# Patient Record
Sex: MALE | Race: ASIAN | NOT HISPANIC OR LATINO | URBAN - METROPOLITAN AREA
[De-identification: names, ages, dates, MRNs, and addresses within clinical notes are randomized per-mention and may not be internally consistent; named-entity substitution may affect disease eponyms.]

---

## 2023-01-01 ENCOUNTER — INPATIENT (INPATIENT)
Facility: HOSPITAL | Age: 83
LOS: 11 days | DRG: 871 | End: 2023-11-22
Attending: STUDENT IN AN ORGANIZED HEALTH CARE EDUCATION/TRAINING PROGRAM | Admitting: STUDENT IN AN ORGANIZED HEALTH CARE EDUCATION/TRAINING PROGRAM
Payer: MEDICARE

## 2023-01-01 VITALS
DIASTOLIC BLOOD PRESSURE: 78 MMHG | WEIGHT: 167.99 LBS | RESPIRATION RATE: 22 BRPM | HEART RATE: 104 BPM | HEIGHT: 63 IN | OXYGEN SATURATION: 95 % | SYSTOLIC BLOOD PRESSURE: 151 MMHG | TEMPERATURE: 103 F

## 2023-01-01 VITALS — HEART RATE: 102 BPM | TEMPERATURE: 103 F

## 2023-01-01 DIAGNOSIS — I10 ESSENTIAL (PRIMARY) HYPERTENSION: ICD-10-CM

## 2023-01-01 DIAGNOSIS — R09.89 OTHER SPECIFIED SYMPTOMS AND SIGNS INVOLVING THE CIRCULATORY AND RESPIRATORY SYSTEMS: ICD-10-CM

## 2023-01-01 DIAGNOSIS — Z29.9 ENCOUNTER FOR PROPHYLACTIC MEASURES, UNSPECIFIED: ICD-10-CM

## 2023-01-01 DIAGNOSIS — R13.12 DYSPHAGIA, OROPHARYNGEAL PHASE: ICD-10-CM

## 2023-01-01 DIAGNOSIS — F03.C0 UNSPECIFIED DEMENTIA, SEVERE, WITHOUT BEHAVIORAL DISTURBANCE, PSYCHOTIC DISTURBANCE, MOOD DISTURBANCE, AND ANXIETY: ICD-10-CM

## 2023-01-01 DIAGNOSIS — J06.9 ACUTE UPPER RESPIRATORY INFECTION, UNSPECIFIED: ICD-10-CM

## 2023-01-01 DIAGNOSIS — E44.0 MODERATE PROTEIN-CALORIE MALNUTRITION: ICD-10-CM

## 2023-01-01 DIAGNOSIS — A41.9 SEPSIS, UNSPECIFIED ORGANISM: ICD-10-CM

## 2023-01-01 DIAGNOSIS — E11.9 TYPE 2 DIABETES MELLITUS WITHOUT COMPLICATIONS: ICD-10-CM

## 2023-01-01 DIAGNOSIS — R29.810 FACIAL WEAKNESS: ICD-10-CM

## 2023-01-01 DIAGNOSIS — Z51.5 ENCOUNTER FOR PALLIATIVE CARE: ICD-10-CM

## 2023-01-01 DIAGNOSIS — R53.81 OTHER MALAISE: ICD-10-CM

## 2023-01-01 DIAGNOSIS — H10.9 UNSPECIFIED CONJUNCTIVITIS: ICD-10-CM

## 2023-01-01 DIAGNOSIS — I63.9 CEREBRAL INFARCTION, UNSPECIFIED: ICD-10-CM

## 2023-01-01 DIAGNOSIS — J96.01 ACUTE RESPIRATORY FAILURE WITH HYPOXIA: ICD-10-CM

## 2023-01-01 DIAGNOSIS — Z02.9 ENCOUNTER FOR ADMINISTRATIVE EXAMINATIONS, UNSPECIFIED: ICD-10-CM

## 2023-01-01 LAB
A1C WITH ESTIMATED AVERAGE GLUCOSE RESULT: 5.9 % — HIGH (ref 4–5.6)
A1C WITH ESTIMATED AVERAGE GLUCOSE RESULT: 5.9 % — HIGH (ref 4–5.6)
ALBUMIN SERPL ELPH-MCNC: 2.4 G/DL — LOW (ref 3.5–5)
ALBUMIN SERPL ELPH-MCNC: 2.4 G/DL — LOW (ref 3.5–5)
ALBUMIN SERPL ELPH-MCNC: 2.5 G/DL — LOW (ref 3.5–5)
ALBUMIN SERPL ELPH-MCNC: 2.5 G/DL — LOW (ref 3.5–5)
ALBUMIN SERPL ELPH-MCNC: 2.6 G/DL — LOW (ref 3.5–5)
ALBUMIN SERPL ELPH-MCNC: 2.6 G/DL — LOW (ref 3.5–5)
ALBUMIN SERPL ELPH-MCNC: 3.6 G/DL — SIGNIFICANT CHANGE UP (ref 3.5–5)
ALBUMIN SERPL ELPH-MCNC: 3.6 G/DL — SIGNIFICANT CHANGE UP (ref 3.5–5)
ALP SERPL-CCNC: 63 U/L — SIGNIFICANT CHANGE UP (ref 40–120)
ALP SERPL-CCNC: 63 U/L — SIGNIFICANT CHANGE UP (ref 40–120)
ALP SERPL-CCNC: 64 U/L — SIGNIFICANT CHANGE UP (ref 40–120)
ALP SERPL-CCNC: 85 U/L — SIGNIFICANT CHANGE UP (ref 40–120)
ALP SERPL-CCNC: 85 U/L — SIGNIFICANT CHANGE UP (ref 40–120)
ALT FLD-CCNC: 24 U/L DA — SIGNIFICANT CHANGE UP (ref 10–60)
ALT FLD-CCNC: 24 U/L DA — SIGNIFICANT CHANGE UP (ref 10–60)
ALT FLD-CCNC: 25 U/L DA — SIGNIFICANT CHANGE UP (ref 10–60)
ALT FLD-CCNC: 25 U/L DA — SIGNIFICANT CHANGE UP (ref 10–60)
ALT FLD-CCNC: 26 U/L DA — SIGNIFICANT CHANGE UP (ref 10–60)
ALT FLD-CCNC: 26 U/L DA — SIGNIFICANT CHANGE UP (ref 10–60)
ALT FLD-CCNC: 30 U/L DA — SIGNIFICANT CHANGE UP (ref 10–60)
ALT FLD-CCNC: 30 U/L DA — SIGNIFICANT CHANGE UP (ref 10–60)
ANION GAP SERPL CALC-SCNC: 5 MMOL/L — SIGNIFICANT CHANGE UP (ref 5–17)
ANION GAP SERPL CALC-SCNC: 6 MMOL/L — SIGNIFICANT CHANGE UP (ref 5–17)
ANION GAP SERPL CALC-SCNC: 6 MMOL/L — SIGNIFICANT CHANGE UP (ref 5–17)
ANION GAP SERPL CALC-SCNC: 7 MMOL/L — SIGNIFICANT CHANGE UP (ref 5–17)
ANION GAP SERPL CALC-SCNC: 9 MMOL/L — SIGNIFICANT CHANGE UP (ref 5–17)
ANION GAP SERPL CALC-SCNC: 9 MMOL/L — SIGNIFICANT CHANGE UP (ref 5–17)
APPEARANCE UR: CLEAR — SIGNIFICANT CHANGE UP
APPEARANCE UR: CLEAR — SIGNIFICANT CHANGE UP
APTT BLD: 37.7 SEC — HIGH (ref 24.5–35.6)
APTT BLD: 37.7 SEC — HIGH (ref 24.5–35.6)
AST SERPL-CCNC: 20 U/L — SIGNIFICANT CHANGE UP (ref 10–40)
AST SERPL-CCNC: 20 U/L — SIGNIFICANT CHANGE UP (ref 10–40)
AST SERPL-CCNC: 24 U/L — SIGNIFICANT CHANGE UP (ref 10–40)
AST SERPL-CCNC: 24 U/L — SIGNIFICANT CHANGE UP (ref 10–40)
AST SERPL-CCNC: 39 U/L — SIGNIFICANT CHANGE UP (ref 10–40)
AST SERPL-CCNC: 39 U/L — SIGNIFICANT CHANGE UP (ref 10–40)
AST SERPL-CCNC: 45 U/L — HIGH (ref 10–40)
AST SERPL-CCNC: 45 U/L — HIGH (ref 10–40)
B PERT DNA SPEC QL NAA+PROBE: SIGNIFICANT CHANGE UP
B PERT DNA SPEC QL NAA+PROBE: SIGNIFICANT CHANGE UP
BACTERIA # UR AUTO: ABNORMAL /HPF
BACTERIA # UR AUTO: ABNORMAL /HPF
BASE EXCESS BLDV CALC-SCNC: 0.6 MMOL/L — SIGNIFICANT CHANGE UP
BASE EXCESS BLDV CALC-SCNC: 0.6 MMOL/L — SIGNIFICANT CHANGE UP
BASOPHILS # BLD AUTO: 0.01 K/UL — SIGNIFICANT CHANGE UP (ref 0–0.2)
BASOPHILS # BLD AUTO: 0.02 K/UL — SIGNIFICANT CHANGE UP (ref 0–0.2)
BASOPHILS # BLD AUTO: 0.02 K/UL — SIGNIFICANT CHANGE UP (ref 0–0.2)
BASOPHILS NFR BLD AUTO: 0.2 % — SIGNIFICANT CHANGE UP (ref 0–2)
BILIRUB SERPL-MCNC: 0.5 MG/DL — SIGNIFICANT CHANGE UP (ref 0.2–1.2)
BILIRUB SERPL-MCNC: 0.5 MG/DL — SIGNIFICANT CHANGE UP (ref 0.2–1.2)
BILIRUB SERPL-MCNC: 0.6 MG/DL — SIGNIFICANT CHANGE UP (ref 0.2–1.2)
BILIRUB SERPL-MCNC: 0.6 MG/DL — SIGNIFICANT CHANGE UP (ref 0.2–1.2)
BILIRUB SERPL-MCNC: 0.8 MG/DL — SIGNIFICANT CHANGE UP (ref 0.2–1.2)
BILIRUB SERPL-MCNC: 0.8 MG/DL — SIGNIFICANT CHANGE UP (ref 0.2–1.2)
BILIRUB SERPL-MCNC: 0.9 MG/DL — SIGNIFICANT CHANGE UP (ref 0.2–1.2)
BILIRUB SERPL-MCNC: 0.9 MG/DL — SIGNIFICANT CHANGE UP (ref 0.2–1.2)
BILIRUB UR-MCNC: NEGATIVE — SIGNIFICANT CHANGE UP
BILIRUB UR-MCNC: NEGATIVE — SIGNIFICANT CHANGE UP
BLOOD GAS COMMENTS, VENOUS: SIGNIFICANT CHANGE UP
BLOOD GAS COMMENTS, VENOUS: SIGNIFICANT CHANGE UP
BUN SERPL-MCNC: 16 MG/DL — SIGNIFICANT CHANGE UP (ref 7–18)
BUN SERPL-MCNC: 16 MG/DL — SIGNIFICANT CHANGE UP (ref 7–18)
BUN SERPL-MCNC: 18 MG/DL — SIGNIFICANT CHANGE UP (ref 7–18)
BUN SERPL-MCNC: 20 MG/DL — HIGH (ref 7–18)
BUN SERPL-MCNC: 20 MG/DL — HIGH (ref 7–18)
BUN SERPL-MCNC: 21 MG/DL — HIGH (ref 7–18)
BUN SERPL-MCNC: 21 MG/DL — HIGH (ref 7–18)
BUN SERPL-MCNC: 22 MG/DL — HIGH (ref 7–18)
BUN SERPL-MCNC: 22 MG/DL — HIGH (ref 7–18)
BUN SERPL-MCNC: 23 MG/DL — HIGH (ref 7–18)
BUN SERPL-MCNC: 23 MG/DL — HIGH (ref 7–18)
BUN SERPL-MCNC: 26 MG/DL — HIGH (ref 7–18)
BUN SERPL-MCNC: 26 MG/DL — HIGH (ref 7–18)
C PNEUM DNA SPEC QL NAA+PROBE: SIGNIFICANT CHANGE UP
C PNEUM DNA SPEC QL NAA+PROBE: SIGNIFICANT CHANGE UP
CALCIUM SERPL-MCNC: 10.1 MG/DL — SIGNIFICANT CHANGE UP (ref 8.4–10.5)
CALCIUM SERPL-MCNC: 10.1 MG/DL — SIGNIFICANT CHANGE UP (ref 8.4–10.5)
CALCIUM SERPL-MCNC: 8.2 MG/DL — LOW (ref 8.4–10.5)
CALCIUM SERPL-MCNC: 8.2 MG/DL — LOW (ref 8.4–10.5)
CALCIUM SERPL-MCNC: 8.3 MG/DL — LOW (ref 8.4–10.5)
CALCIUM SERPL-MCNC: 8.3 MG/DL — LOW (ref 8.4–10.5)
CALCIUM SERPL-MCNC: 8.7 MG/DL — SIGNIFICANT CHANGE UP (ref 8.4–10.5)
CALCIUM SERPL-MCNC: 8.9 MG/DL — SIGNIFICANT CHANGE UP (ref 8.4–10.5)
CALCIUM SERPL-MCNC: 9 MG/DL — SIGNIFICANT CHANGE UP (ref 8.4–10.5)
CALCIUM SERPL-MCNC: 9 MG/DL — SIGNIFICANT CHANGE UP (ref 8.4–10.5)
CHLORIDE SERPL-SCNC: 104 MMOL/L — SIGNIFICANT CHANGE UP (ref 96–108)
CHLORIDE SERPL-SCNC: 104 MMOL/L — SIGNIFICANT CHANGE UP (ref 96–108)
CHLORIDE SERPL-SCNC: 107 MMOL/L — SIGNIFICANT CHANGE UP (ref 96–108)
CHLORIDE SERPL-SCNC: 107 MMOL/L — SIGNIFICANT CHANGE UP (ref 96–108)
CHLORIDE SERPL-SCNC: 109 MMOL/L — HIGH (ref 96–108)
CHLORIDE SERPL-SCNC: 109 MMOL/L — HIGH (ref 96–108)
CHLORIDE SERPL-SCNC: 110 MMOL/L — HIGH (ref 96–108)
CHLORIDE SERPL-SCNC: 111 MMOL/L — HIGH (ref 96–108)
CHLORIDE SERPL-SCNC: 111 MMOL/L — HIGH (ref 96–108)
CHLORIDE SERPL-SCNC: 114 MMOL/L — HIGH (ref 96–108)
CHLORIDE SERPL-SCNC: 114 MMOL/L — HIGH (ref 96–108)
CHLORIDE SERPL-SCNC: 115 MMOL/L — HIGH (ref 96–108)
CHLORIDE SERPL-SCNC: 115 MMOL/L — HIGH (ref 96–108)
CO2 SERPL-SCNC: 22 MMOL/L — SIGNIFICANT CHANGE UP (ref 22–31)
CO2 SERPL-SCNC: 22 MMOL/L — SIGNIFICANT CHANGE UP (ref 22–31)
CO2 SERPL-SCNC: 23 MMOL/L — SIGNIFICANT CHANGE UP (ref 22–31)
CO2 SERPL-SCNC: 23 MMOL/L — SIGNIFICANT CHANGE UP (ref 22–31)
CO2 SERPL-SCNC: 24 MMOL/L — SIGNIFICANT CHANGE UP (ref 22–31)
CO2 SERPL-SCNC: 25 MMOL/L — SIGNIFICANT CHANGE UP (ref 22–31)
CO2 SERPL-SCNC: 26 MMOL/L — SIGNIFICANT CHANGE UP (ref 22–31)
CO2 SERPL-SCNC: 26 MMOL/L — SIGNIFICANT CHANGE UP (ref 22–31)
COLOR SPEC: YELLOW — SIGNIFICANT CHANGE UP
COLOR SPEC: YELLOW — SIGNIFICANT CHANGE UP
CREAT SERPL-MCNC: 0.88 MG/DL — SIGNIFICANT CHANGE UP (ref 0.5–1.3)
CREAT SERPL-MCNC: 0.88 MG/DL — SIGNIFICANT CHANGE UP (ref 0.5–1.3)
CREAT SERPL-MCNC: 0.9 MG/DL — SIGNIFICANT CHANGE UP (ref 0.5–1.3)
CREAT SERPL-MCNC: 0.9 MG/DL — SIGNIFICANT CHANGE UP (ref 0.5–1.3)
CREAT SERPL-MCNC: 0.92 MG/DL — SIGNIFICANT CHANGE UP (ref 0.5–1.3)
CREAT SERPL-MCNC: 0.96 MG/DL — SIGNIFICANT CHANGE UP (ref 0.5–1.3)
CREAT SERPL-MCNC: 0.96 MG/DL — SIGNIFICANT CHANGE UP (ref 0.5–1.3)
CREAT SERPL-MCNC: 0.98 MG/DL — SIGNIFICANT CHANGE UP (ref 0.5–1.3)
CREAT SERPL-MCNC: 0.98 MG/DL — SIGNIFICANT CHANGE UP (ref 0.5–1.3)
CREAT SERPL-MCNC: 1.01 MG/DL — SIGNIFICANT CHANGE UP (ref 0.5–1.3)
CREAT SERPL-MCNC: 1.01 MG/DL — SIGNIFICANT CHANGE UP (ref 0.5–1.3)
CREAT SERPL-MCNC: 1.28 MG/DL — SIGNIFICANT CHANGE UP (ref 0.5–1.3)
CREAT SERPL-MCNC: 1.28 MG/DL — SIGNIFICANT CHANGE UP (ref 0.5–1.3)
CULTURE RESULTS: SIGNIFICANT CHANGE UP
DIFF PNL FLD: ABNORMAL
DIFF PNL FLD: ABNORMAL
EGFR: 56 ML/MIN/1.73M2 — LOW
EGFR: 56 ML/MIN/1.73M2 — LOW
EGFR: 74 ML/MIN/1.73M2 — SIGNIFICANT CHANGE UP
EGFR: 74 ML/MIN/1.73M2 — SIGNIFICANT CHANGE UP
EGFR: 77 ML/MIN/1.73M2 — SIGNIFICANT CHANGE UP
EGFR: 77 ML/MIN/1.73M2 — SIGNIFICANT CHANGE UP
EGFR: 78 ML/MIN/1.73M2 — SIGNIFICANT CHANGE UP
EGFR: 78 ML/MIN/1.73M2 — SIGNIFICANT CHANGE UP
EGFR: 83 ML/MIN/1.73M2 — SIGNIFICANT CHANGE UP
EGFR: 85 ML/MIN/1.73M2 — SIGNIFICANT CHANGE UP
EOSINOPHIL # BLD AUTO: 0 K/UL — SIGNIFICANT CHANGE UP (ref 0–0.5)
EOSINOPHIL # BLD AUTO: 0.01 K/UL — SIGNIFICANT CHANGE UP (ref 0–0.5)
EOSINOPHIL NFR BLD AUTO: 0 % — SIGNIFICANT CHANGE UP (ref 0–6)
EOSINOPHIL NFR BLD AUTO: 0.2 % — SIGNIFICANT CHANGE UP (ref 0–6)
EPI CELLS # UR: PRESENT
EPI CELLS # UR: PRESENT
ESTIMATED AVERAGE GLUCOSE: 123 MG/DL — HIGH (ref 68–114)
ESTIMATED AVERAGE GLUCOSE: 123 MG/DL — HIGH (ref 68–114)
FLUAV H1 2009 PAND RNA SPEC QL NAA+PROBE: SIGNIFICANT CHANGE UP
FLUAV H1 2009 PAND RNA SPEC QL NAA+PROBE: SIGNIFICANT CHANGE UP
FLUAV H1 RNA SPEC QL NAA+PROBE: SIGNIFICANT CHANGE UP
FLUAV H1 RNA SPEC QL NAA+PROBE: SIGNIFICANT CHANGE UP
FLUAV H3 RNA SPEC QL NAA+PROBE: SIGNIFICANT CHANGE UP
FLUAV H3 RNA SPEC QL NAA+PROBE: SIGNIFICANT CHANGE UP
FLUAV SUBTYP SPEC NAA+PROBE: SIGNIFICANT CHANGE UP
FLUAV SUBTYP SPEC NAA+PROBE: SIGNIFICANT CHANGE UP
FLUBV RNA SPEC QL NAA+PROBE: SIGNIFICANT CHANGE UP
FLUBV RNA SPEC QL NAA+PROBE: SIGNIFICANT CHANGE UP
GLUCOSE SERPL-MCNC: 108 MG/DL — HIGH (ref 70–99)
GLUCOSE SERPL-MCNC: 108 MG/DL — HIGH (ref 70–99)
GLUCOSE SERPL-MCNC: 110 MG/DL — HIGH (ref 70–99)
GLUCOSE SERPL-MCNC: 110 MG/DL — HIGH (ref 70–99)
GLUCOSE SERPL-MCNC: 113 MG/DL — HIGH (ref 70–99)
GLUCOSE SERPL-MCNC: 113 MG/DL — HIGH (ref 70–99)
GLUCOSE SERPL-MCNC: 126 MG/DL — HIGH (ref 70–99)
GLUCOSE SERPL-MCNC: 126 MG/DL — HIGH (ref 70–99)
GLUCOSE SERPL-MCNC: 134 MG/DL — HIGH (ref 70–99)
GLUCOSE SERPL-MCNC: 134 MG/DL — HIGH (ref 70–99)
GLUCOSE SERPL-MCNC: 138 MG/DL — HIGH (ref 70–99)
GLUCOSE SERPL-MCNC: 138 MG/DL — HIGH (ref 70–99)
GLUCOSE SERPL-MCNC: 96 MG/DL — SIGNIFICANT CHANGE UP (ref 70–99)
GLUCOSE SERPL-MCNC: 96 MG/DL — SIGNIFICANT CHANGE UP (ref 70–99)
GLUCOSE SERPL-MCNC: 97 MG/DL — SIGNIFICANT CHANGE UP (ref 70–99)
GLUCOSE SERPL-MCNC: 97 MG/DL — SIGNIFICANT CHANGE UP (ref 70–99)
GLUCOSE UR QL: NEGATIVE MG/DL — SIGNIFICANT CHANGE UP
GLUCOSE UR QL: NEGATIVE MG/DL — SIGNIFICANT CHANGE UP
HADV DNA SPEC QL NAA+PROBE: SIGNIFICANT CHANGE UP
HADV DNA SPEC QL NAA+PROBE: SIGNIFICANT CHANGE UP
HCO3 BLDV-SCNC: 24 MMOL/L — SIGNIFICANT CHANGE UP (ref 22–29)
HCO3 BLDV-SCNC: 24 MMOL/L — SIGNIFICANT CHANGE UP (ref 22–29)
HCOV PNL SPEC NAA+PROBE: DETECTED
HCOV PNL SPEC NAA+PROBE: DETECTED
HCT VFR BLD CALC: 37 % — LOW (ref 39–50)
HCT VFR BLD CALC: 37 % — LOW (ref 39–50)
HCT VFR BLD CALC: 37.3 % — LOW (ref 39–50)
HCT VFR BLD CALC: 37.3 % — LOW (ref 39–50)
HCT VFR BLD CALC: 38.4 % — LOW (ref 39–50)
HCT VFR BLD CALC: 38.4 % — LOW (ref 39–50)
HCT VFR BLD CALC: 39 % — SIGNIFICANT CHANGE UP (ref 39–50)
HCT VFR BLD CALC: 39 % — SIGNIFICANT CHANGE UP (ref 39–50)
HCT VFR BLD CALC: 39.5 % — SIGNIFICANT CHANGE UP (ref 39–50)
HCT VFR BLD CALC: 40.5 % — SIGNIFICANT CHANGE UP (ref 39–50)
HCT VFR BLD CALC: 40.5 % — SIGNIFICANT CHANGE UP (ref 39–50)
HCT VFR BLD CALC: 43.9 % — SIGNIFICANT CHANGE UP (ref 39–50)
HCT VFR BLD CALC: 43.9 % — SIGNIFICANT CHANGE UP (ref 39–50)
HGB BLD-MCNC: 12.3 G/DL — LOW (ref 13–17)
HGB BLD-MCNC: 12.3 G/DL — LOW (ref 13–17)
HGB BLD-MCNC: 12.4 G/DL — LOW (ref 13–17)
HGB BLD-MCNC: 12.4 G/DL — LOW (ref 13–17)
HGB BLD-MCNC: 12.6 G/DL — LOW (ref 13–17)
HGB BLD-MCNC: 12.6 G/DL — LOW (ref 13–17)
HGB BLD-MCNC: 13 G/DL — SIGNIFICANT CHANGE UP (ref 13–17)
HGB BLD-MCNC: 13.1 G/DL — SIGNIFICANT CHANGE UP (ref 13–17)
HGB BLD-MCNC: 13.1 G/DL — SIGNIFICANT CHANGE UP (ref 13–17)
HGB BLD-MCNC: 14.7 G/DL — SIGNIFICANT CHANGE UP (ref 13–17)
HGB BLD-MCNC: 14.7 G/DL — SIGNIFICANT CHANGE UP (ref 13–17)
HMPV RNA SPEC QL NAA+PROBE: SIGNIFICANT CHANGE UP
HMPV RNA SPEC QL NAA+PROBE: SIGNIFICANT CHANGE UP
HOROWITZ INDEX BLDV+IHG-RTO: 21 — SIGNIFICANT CHANGE UP
HOROWITZ INDEX BLDV+IHG-RTO: 21 — SIGNIFICANT CHANGE UP
HPIV1 RNA SPEC QL NAA+PROBE: SIGNIFICANT CHANGE UP
HPIV1 RNA SPEC QL NAA+PROBE: SIGNIFICANT CHANGE UP
HPIV2 RNA SPEC QL NAA+PROBE: SIGNIFICANT CHANGE UP
HPIV2 RNA SPEC QL NAA+PROBE: SIGNIFICANT CHANGE UP
HPIV3 RNA SPEC QL NAA+PROBE: SIGNIFICANT CHANGE UP
HPIV3 RNA SPEC QL NAA+PROBE: SIGNIFICANT CHANGE UP
HPIV4 RNA SPEC QL NAA+PROBE: SIGNIFICANT CHANGE UP
HPIV4 RNA SPEC QL NAA+PROBE: SIGNIFICANT CHANGE UP
IMM GRANULOCYTES NFR BLD AUTO: 0.2 % — SIGNIFICANT CHANGE UP (ref 0–0.9)
IMM GRANULOCYTES NFR BLD AUTO: 0.2 % — SIGNIFICANT CHANGE UP (ref 0–0.9)
IMM GRANULOCYTES NFR BLD AUTO: 0.3 % — SIGNIFICANT CHANGE UP (ref 0–0.9)
IMM GRANULOCYTES NFR BLD AUTO: 0.3 % — SIGNIFICANT CHANGE UP (ref 0–0.9)
IMM GRANULOCYTES NFR BLD AUTO: 0.4 % — SIGNIFICANT CHANGE UP (ref 0–0.9)
INR BLD: 1.01 RATIO — SIGNIFICANT CHANGE UP (ref 0.85–1.18)
INR BLD: 1.01 RATIO — SIGNIFICANT CHANGE UP (ref 0.85–1.18)
KETONES UR-MCNC: 15 MG/DL
KETONES UR-MCNC: 15 MG/DL
LACTATE SERPL-SCNC: 1.9 MMOL/L — SIGNIFICANT CHANGE UP (ref 0.7–2)
LACTATE SERPL-SCNC: 1.9 MMOL/L — SIGNIFICANT CHANGE UP (ref 0.7–2)
LEUKOCYTE ESTERASE UR-ACNC: NEGATIVE — SIGNIFICANT CHANGE UP
LEUKOCYTE ESTERASE UR-ACNC: NEGATIVE — SIGNIFICANT CHANGE UP
LYMPHOCYTES # BLD AUTO: 0.55 K/UL — LOW (ref 1–3.3)
LYMPHOCYTES # BLD AUTO: 0.55 K/UL — LOW (ref 1–3.3)
LYMPHOCYTES # BLD AUTO: 0.88 K/UL — LOW (ref 1–3.3)
LYMPHOCYTES # BLD AUTO: 0.88 K/UL — LOW (ref 1–3.3)
LYMPHOCYTES # BLD AUTO: 0.95 K/UL — LOW (ref 1–3.3)
LYMPHOCYTES # BLD AUTO: 0.95 K/UL — LOW (ref 1–3.3)
LYMPHOCYTES # BLD AUTO: 1.1 K/UL — SIGNIFICANT CHANGE UP (ref 1–3.3)
LYMPHOCYTES # BLD AUTO: 1.1 K/UL — SIGNIFICANT CHANGE UP (ref 1–3.3)
LYMPHOCYTES # BLD AUTO: 16.2 % — SIGNIFICANT CHANGE UP (ref 13–44)
LYMPHOCYTES # BLD AUTO: 16.2 % — SIGNIFICANT CHANGE UP (ref 13–44)
LYMPHOCYTES # BLD AUTO: 17.9 % — SIGNIFICANT CHANGE UP (ref 13–44)
LYMPHOCYTES # BLD AUTO: 17.9 % — SIGNIFICANT CHANGE UP (ref 13–44)
LYMPHOCYTES # BLD AUTO: 20.1 % — SIGNIFICANT CHANGE UP (ref 13–44)
LYMPHOCYTES # BLD AUTO: 20.1 % — SIGNIFICANT CHANGE UP (ref 13–44)
LYMPHOCYTES # BLD AUTO: 6.8 % — LOW (ref 13–44)
LYMPHOCYTES # BLD AUTO: 6.8 % — LOW (ref 13–44)
MAGNESIUM SERPL-MCNC: 2 MG/DL — SIGNIFICANT CHANGE UP (ref 1.6–2.6)
MAGNESIUM SERPL-MCNC: 2 MG/DL — SIGNIFICANT CHANGE UP (ref 1.6–2.6)
MAGNESIUM SERPL-MCNC: 2.2 MG/DL — SIGNIFICANT CHANGE UP (ref 1.6–2.6)
MAGNESIUM SERPL-MCNC: 2.2 MG/DL — SIGNIFICANT CHANGE UP (ref 1.6–2.6)
MAGNESIUM SERPL-MCNC: 2.3 MG/DL — SIGNIFICANT CHANGE UP (ref 1.6–2.6)
MAGNESIUM SERPL-MCNC: 2.3 MG/DL — SIGNIFICANT CHANGE UP (ref 1.6–2.6)
MAGNESIUM SERPL-MCNC: 2.4 MG/DL — SIGNIFICANT CHANGE UP (ref 1.6–2.6)
MAGNESIUM SERPL-MCNC: 2.5 MG/DL — SIGNIFICANT CHANGE UP (ref 1.6–2.6)
MCHC RBC-ENTMCNC: 30.3 PG — SIGNIFICANT CHANGE UP (ref 27–34)
MCHC RBC-ENTMCNC: 30.3 PG — SIGNIFICANT CHANGE UP (ref 27–34)
MCHC RBC-ENTMCNC: 30.6 PG — SIGNIFICANT CHANGE UP (ref 27–34)
MCHC RBC-ENTMCNC: 30.6 PG — SIGNIFICANT CHANGE UP (ref 27–34)
MCHC RBC-ENTMCNC: 30.8 PG — SIGNIFICANT CHANGE UP (ref 27–34)
MCHC RBC-ENTMCNC: 30.8 PG — SIGNIFICANT CHANGE UP (ref 27–34)
MCHC RBC-ENTMCNC: 30.9 PG — SIGNIFICANT CHANGE UP (ref 27–34)
MCHC RBC-ENTMCNC: 30.9 PG — SIGNIFICANT CHANGE UP (ref 27–34)
MCHC RBC-ENTMCNC: 31.1 PG — SIGNIFICANT CHANGE UP (ref 27–34)
MCHC RBC-ENTMCNC: 31.1 PG — SIGNIFICANT CHANGE UP (ref 27–34)
MCHC RBC-ENTMCNC: 31.3 PG — SIGNIFICANT CHANGE UP (ref 27–34)
MCHC RBC-ENTMCNC: 31.3 PG — SIGNIFICANT CHANGE UP (ref 27–34)
MCHC RBC-ENTMCNC: 31.5 PG — SIGNIFICANT CHANGE UP (ref 27–34)
MCHC RBC-ENTMCNC: 32.3 GM/DL — SIGNIFICANT CHANGE UP (ref 32–36)
MCHC RBC-ENTMCNC: 32.3 GM/DL — SIGNIFICANT CHANGE UP (ref 32–36)
MCHC RBC-ENTMCNC: 32.8 GM/DL — SIGNIFICANT CHANGE UP (ref 32–36)
MCHC RBC-ENTMCNC: 32.8 GM/DL — SIGNIFICANT CHANGE UP (ref 32–36)
MCHC RBC-ENTMCNC: 32.9 GM/DL — SIGNIFICANT CHANGE UP (ref 32–36)
MCHC RBC-ENTMCNC: 33.2 GM/DL — SIGNIFICANT CHANGE UP (ref 32–36)
MCHC RBC-ENTMCNC: 33.3 GM/DL — SIGNIFICANT CHANGE UP (ref 32–36)
MCHC RBC-ENTMCNC: 33.3 GM/DL — SIGNIFICANT CHANGE UP (ref 32–36)
MCHC RBC-ENTMCNC: 33.5 GM/DL — SIGNIFICANT CHANGE UP (ref 32–36)
MCHC RBC-ENTMCNC: 33.5 GM/DL — SIGNIFICANT CHANGE UP (ref 32–36)
MCV RBC AUTO: 92.8 FL — SIGNIFICANT CHANGE UP (ref 80–100)
MCV RBC AUTO: 92.8 FL — SIGNIFICANT CHANGE UP (ref 80–100)
MCV RBC AUTO: 93 FL — SIGNIFICANT CHANGE UP (ref 80–100)
MCV RBC AUTO: 93 FL — SIGNIFICANT CHANGE UP (ref 80–100)
MCV RBC AUTO: 93.2 FL — SIGNIFICANT CHANGE UP (ref 80–100)
MCV RBC AUTO: 93.2 FL — SIGNIFICANT CHANGE UP (ref 80–100)
MCV RBC AUTO: 93.6 FL — SIGNIFICANT CHANGE UP (ref 80–100)
MCV RBC AUTO: 93.6 FL — SIGNIFICANT CHANGE UP (ref 80–100)
MCV RBC AUTO: 93.8 FL — SIGNIFICANT CHANGE UP (ref 80–100)
MCV RBC AUTO: 93.8 FL — SIGNIFICANT CHANGE UP (ref 80–100)
MCV RBC AUTO: 94 FL — SIGNIFICANT CHANGE UP (ref 80–100)
MCV RBC AUTO: 94 FL — SIGNIFICANT CHANGE UP (ref 80–100)
MCV RBC AUTO: 94.7 FL — SIGNIFICANT CHANGE UP (ref 80–100)
MCV RBC AUTO: 94.7 FL — SIGNIFICANT CHANGE UP (ref 80–100)
MCV RBC AUTO: 95.6 FL — SIGNIFICANT CHANGE UP (ref 80–100)
MCV RBC AUTO: 95.6 FL — SIGNIFICANT CHANGE UP (ref 80–100)
MONOCYTES # BLD AUTO: 0.52 K/UL — SIGNIFICANT CHANGE UP (ref 0–0.9)
MONOCYTES # BLD AUTO: 0.52 K/UL — SIGNIFICANT CHANGE UP (ref 0–0.9)
MONOCYTES # BLD AUTO: 0.56 K/UL — SIGNIFICANT CHANGE UP (ref 0–0.9)
MONOCYTES # BLD AUTO: 0.56 K/UL — SIGNIFICANT CHANGE UP (ref 0–0.9)
MONOCYTES # BLD AUTO: 0.58 K/UL — SIGNIFICANT CHANGE UP (ref 0–0.9)
MONOCYTES # BLD AUTO: 0.58 K/UL — SIGNIFICANT CHANGE UP (ref 0–0.9)
MONOCYTES # BLD AUTO: 0.71 K/UL — SIGNIFICANT CHANGE UP (ref 0–0.9)
MONOCYTES # BLD AUTO: 0.71 K/UL — SIGNIFICANT CHANGE UP (ref 0–0.9)
MONOCYTES NFR BLD AUTO: 11.4 % — SIGNIFICANT CHANGE UP (ref 2–14)
MONOCYTES NFR BLD AUTO: 11.4 % — SIGNIFICANT CHANGE UP (ref 2–14)
MONOCYTES NFR BLD AUTO: 8.8 % — SIGNIFICANT CHANGE UP (ref 2–14)
MONOCYTES NFR BLD AUTO: 8.8 % — SIGNIFICANT CHANGE UP (ref 2–14)
MONOCYTES NFR BLD AUTO: 9.5 % — SIGNIFICANT CHANGE UP (ref 2–14)
MONOCYTES NFR BLD AUTO: 9.5 % — SIGNIFICANT CHANGE UP (ref 2–14)
MONOCYTES NFR BLD AUTO: 9.9 % — SIGNIFICANT CHANGE UP (ref 2–14)
MONOCYTES NFR BLD AUTO: 9.9 % — SIGNIFICANT CHANGE UP (ref 2–14)
MRSA PCR RESULT.: SIGNIFICANT CHANGE UP
MRSA PCR RESULT.: SIGNIFICANT CHANGE UP
NEUTROPHILS # BLD AUTO: 3.44 K/UL — SIGNIFICANT CHANGE UP (ref 1.8–7.4)
NEUTROPHILS # BLD AUTO: 3.44 K/UL — SIGNIFICANT CHANGE UP (ref 1.8–7.4)
NEUTROPHILS # BLD AUTO: 3.81 K/UL — SIGNIFICANT CHANGE UP (ref 1.8–7.4)
NEUTROPHILS # BLD AUTO: 3.81 K/UL — SIGNIFICANT CHANGE UP (ref 1.8–7.4)
NEUTROPHILS # BLD AUTO: 4.32 K/UL — SIGNIFICANT CHANGE UP (ref 1.8–7.4)
NEUTROPHILS # BLD AUTO: 4.32 K/UL — SIGNIFICANT CHANGE UP (ref 1.8–7.4)
NEUTROPHILS # BLD AUTO: 6.73 K/UL — SIGNIFICANT CHANGE UP (ref 1.8–7.4)
NEUTROPHILS # BLD AUTO: 6.73 K/UL — SIGNIFICANT CHANGE UP (ref 1.8–7.4)
NEUTROPHILS NFR BLD AUTO: 69.6 % — SIGNIFICANT CHANGE UP (ref 43–77)
NEUTROPHILS NFR BLD AUTO: 69.6 % — SIGNIFICANT CHANGE UP (ref 43–77)
NEUTROPHILS NFR BLD AUTO: 69.9 % — SIGNIFICANT CHANGE UP (ref 43–77)
NEUTROPHILS NFR BLD AUTO: 69.9 % — SIGNIFICANT CHANGE UP (ref 43–77)
NEUTROPHILS NFR BLD AUTO: 73.4 % — SIGNIFICANT CHANGE UP (ref 43–77)
NEUTROPHILS NFR BLD AUTO: 73.4 % — SIGNIFICANT CHANGE UP (ref 43–77)
NEUTROPHILS NFR BLD AUTO: 84 % — HIGH (ref 43–77)
NEUTROPHILS NFR BLD AUTO: 84 % — HIGH (ref 43–77)
NITRITE UR-MCNC: NEGATIVE — SIGNIFICANT CHANGE UP
NITRITE UR-MCNC: NEGATIVE — SIGNIFICANT CHANGE UP
NRBC # BLD: 0 /100 WBCS — SIGNIFICANT CHANGE UP (ref 0–0)
PCO2 BLDV: 36 MMHG — LOW (ref 42–55)
PCO2 BLDV: 36 MMHG — LOW (ref 42–55)
PH BLDV: 7.44 — HIGH (ref 7.32–7.43)
PH BLDV: 7.44 — HIGH (ref 7.32–7.43)
PH UR: 5.5 — SIGNIFICANT CHANGE UP (ref 5–8)
PH UR: 5.5 — SIGNIFICANT CHANGE UP (ref 5–8)
PHOSPHATE SERPL-MCNC: 2 MG/DL — LOW (ref 2.5–4.5)
PHOSPHATE SERPL-MCNC: 2 MG/DL — LOW (ref 2.5–4.5)
PHOSPHATE SERPL-MCNC: 2.4 MG/DL — LOW (ref 2.5–4.5)
PHOSPHATE SERPL-MCNC: 2.4 MG/DL — LOW (ref 2.5–4.5)
PHOSPHATE SERPL-MCNC: 2.6 MG/DL — SIGNIFICANT CHANGE UP (ref 2.5–4.5)
PHOSPHATE SERPL-MCNC: 2.6 MG/DL — SIGNIFICANT CHANGE UP (ref 2.5–4.5)
PHOSPHATE SERPL-MCNC: 2.8 MG/DL — SIGNIFICANT CHANGE UP (ref 2.5–4.5)
PHOSPHATE SERPL-MCNC: 2.8 MG/DL — SIGNIFICANT CHANGE UP (ref 2.5–4.5)
PHOSPHATE SERPL-MCNC: 2.9 MG/DL — SIGNIFICANT CHANGE UP (ref 2.5–4.5)
PHOSPHATE SERPL-MCNC: 3 MG/DL — SIGNIFICANT CHANGE UP (ref 2.5–4.5)
PHOSPHATE SERPL-MCNC: 3 MG/DL — SIGNIFICANT CHANGE UP (ref 2.5–4.5)
PLATELET # BLD AUTO: 167 K/UL — SIGNIFICANT CHANGE UP (ref 150–400)
PLATELET # BLD AUTO: 167 K/UL — SIGNIFICANT CHANGE UP (ref 150–400)
PLATELET # BLD AUTO: 177 K/UL — SIGNIFICANT CHANGE UP (ref 150–400)
PLATELET # BLD AUTO: 177 K/UL — SIGNIFICANT CHANGE UP (ref 150–400)
PLATELET # BLD AUTO: 179 K/UL — SIGNIFICANT CHANGE UP (ref 150–400)
PLATELET # BLD AUTO: 179 K/UL — SIGNIFICANT CHANGE UP (ref 150–400)
PLATELET # BLD AUTO: 191 K/UL — SIGNIFICANT CHANGE UP (ref 150–400)
PLATELET # BLD AUTO: 191 K/UL — SIGNIFICANT CHANGE UP (ref 150–400)
PLATELET # BLD AUTO: 199 K/UL — SIGNIFICANT CHANGE UP (ref 150–400)
PLATELET # BLD AUTO: 199 K/UL — SIGNIFICANT CHANGE UP (ref 150–400)
PLATELET # BLD AUTO: 200 K/UL — SIGNIFICANT CHANGE UP (ref 150–400)
PLATELET # BLD AUTO: 200 K/UL — SIGNIFICANT CHANGE UP (ref 150–400)
PLATELET # BLD AUTO: 206 K/UL — SIGNIFICANT CHANGE UP (ref 150–400)
PLATELET # BLD AUTO: 206 K/UL — SIGNIFICANT CHANGE UP (ref 150–400)
PLATELET # BLD AUTO: 223 K/UL — SIGNIFICANT CHANGE UP (ref 150–400)
PLATELET # BLD AUTO: 223 K/UL — SIGNIFICANT CHANGE UP (ref 150–400)
PO2 BLDV: 51 MMHG — SIGNIFICANT CHANGE UP
PO2 BLDV: 51 MMHG — SIGNIFICANT CHANGE UP
POTASSIUM SERPL-MCNC: 3.4 MMOL/L — LOW (ref 3.5–5.3)
POTASSIUM SERPL-MCNC: 3.7 MMOL/L — SIGNIFICANT CHANGE UP (ref 3.5–5.3)
POTASSIUM SERPL-MCNC: 3.7 MMOL/L — SIGNIFICANT CHANGE UP (ref 3.5–5.3)
POTASSIUM SERPL-MCNC: 3.8 MMOL/L — SIGNIFICANT CHANGE UP (ref 3.5–5.3)
POTASSIUM SERPL-MCNC: 3.8 MMOL/L — SIGNIFICANT CHANGE UP (ref 3.5–5.3)
POTASSIUM SERPL-MCNC: 3.9 MMOL/L — SIGNIFICANT CHANGE UP (ref 3.5–5.3)
POTASSIUM SERPL-MCNC: 4 MMOL/L — SIGNIFICANT CHANGE UP (ref 3.5–5.3)
POTASSIUM SERPL-MCNC: 4 MMOL/L — SIGNIFICANT CHANGE UP (ref 3.5–5.3)
POTASSIUM SERPL-MCNC: 4.6 MMOL/L — SIGNIFICANT CHANGE UP (ref 3.5–5.3)
POTASSIUM SERPL-MCNC: 4.6 MMOL/L — SIGNIFICANT CHANGE UP (ref 3.5–5.3)
POTASSIUM SERPL-SCNC: 3.4 MMOL/L — LOW (ref 3.5–5.3)
POTASSIUM SERPL-SCNC: 3.7 MMOL/L — SIGNIFICANT CHANGE UP (ref 3.5–5.3)
POTASSIUM SERPL-SCNC: 3.7 MMOL/L — SIGNIFICANT CHANGE UP (ref 3.5–5.3)
POTASSIUM SERPL-SCNC: 3.8 MMOL/L — SIGNIFICANT CHANGE UP (ref 3.5–5.3)
POTASSIUM SERPL-SCNC: 3.8 MMOL/L — SIGNIFICANT CHANGE UP (ref 3.5–5.3)
POTASSIUM SERPL-SCNC: 3.9 MMOL/L — SIGNIFICANT CHANGE UP (ref 3.5–5.3)
POTASSIUM SERPL-SCNC: 4 MMOL/L — SIGNIFICANT CHANGE UP (ref 3.5–5.3)
POTASSIUM SERPL-SCNC: 4 MMOL/L — SIGNIFICANT CHANGE UP (ref 3.5–5.3)
POTASSIUM SERPL-SCNC: 4.6 MMOL/L — SIGNIFICANT CHANGE UP (ref 3.5–5.3)
POTASSIUM SERPL-SCNC: 4.6 MMOL/L — SIGNIFICANT CHANGE UP (ref 3.5–5.3)
PROT SERPL-MCNC: 7.1 G/DL — SIGNIFICANT CHANGE UP (ref 6–8.3)
PROT SERPL-MCNC: 7.4 G/DL — SIGNIFICANT CHANGE UP (ref 6–8.3)
PROT SERPL-MCNC: 7.4 G/DL — SIGNIFICANT CHANGE UP (ref 6–8.3)
PROT SERPL-MCNC: 8.7 G/DL — HIGH (ref 6–8.3)
PROT SERPL-MCNC: 8.7 G/DL — HIGH (ref 6–8.3)
PROT UR-MCNC: 100 MG/DL
PROT UR-MCNC: 100 MG/DL
PROTHROM AB SERPL-ACNC: 11.5 SEC — SIGNIFICANT CHANGE UP (ref 9.5–13)
PROTHROM AB SERPL-ACNC: 11.5 SEC — SIGNIFICANT CHANGE UP (ref 9.5–13)
RAPID RVP RESULT: DETECTED
RAPID RVP RESULT: DETECTED
RBC # BLD: 3.94 M/UL — LOW (ref 4.2–5.8)
RBC # BLD: 3.94 M/UL — LOW (ref 4.2–5.8)
RBC # BLD: 3.98 M/UL — LOW (ref 4.2–5.8)
RBC # BLD: 3.98 M/UL — LOW (ref 4.2–5.8)
RBC # BLD: 4.12 M/UL — LOW (ref 4.2–5.8)
RBC # BLD: 4.12 M/UL — LOW (ref 4.2–5.8)
RBC # BLD: 4.13 M/UL — LOW (ref 4.2–5.8)
RBC # BLD: 4.13 M/UL — LOW (ref 4.2–5.8)
RBC # BLD: 4.15 M/UL — LOW (ref 4.2–5.8)
RBC # BLD: 4.15 M/UL — LOW (ref 4.2–5.8)
RBC # BLD: 4.22 M/UL — SIGNIFICANT CHANGE UP (ref 4.2–5.8)
RBC # BLD: 4.22 M/UL — SIGNIFICANT CHANGE UP (ref 4.2–5.8)
RBC # BLD: 4.32 M/UL — SIGNIFICANT CHANGE UP (ref 4.2–5.8)
RBC # BLD: 4.32 M/UL — SIGNIFICANT CHANGE UP (ref 4.2–5.8)
RBC # BLD: 4.73 M/UL — SIGNIFICANT CHANGE UP (ref 4.2–5.8)
RBC # BLD: 4.73 M/UL — SIGNIFICANT CHANGE UP (ref 4.2–5.8)
RBC # FLD: 12.8 % — SIGNIFICANT CHANGE UP (ref 10.3–14.5)
RBC # FLD: 12.9 % — SIGNIFICANT CHANGE UP (ref 10.3–14.5)
RBC # FLD: 13.2 % — SIGNIFICANT CHANGE UP (ref 10.3–14.5)
RBC # FLD: 13.4 % — SIGNIFICANT CHANGE UP (ref 10.3–14.5)
RBC # FLD: 13.4 % — SIGNIFICANT CHANGE UP (ref 10.3–14.5)
RBC # FLD: 13.5 % — SIGNIFICANT CHANGE UP (ref 10.3–14.5)
RBC # FLD: 13.5 % — SIGNIFICANT CHANGE UP (ref 10.3–14.5)
RBC CASTS # UR COMP ASSIST: 0 /HPF — SIGNIFICANT CHANGE UP (ref 0–4)
RBC CASTS # UR COMP ASSIST: 0 /HPF — SIGNIFICANT CHANGE UP (ref 0–4)
RV+EV RNA SPEC QL NAA+PROBE: SIGNIFICANT CHANGE UP
RV+EV RNA SPEC QL NAA+PROBE: SIGNIFICANT CHANGE UP
S AUREUS DNA NOSE QL NAA+PROBE: DETECTED
S AUREUS DNA NOSE QL NAA+PROBE: DETECTED
SAO2 % BLDV: 85.4 % — SIGNIFICANT CHANGE UP
SAO2 % BLDV: 85.4 % — SIGNIFICANT CHANGE UP
SARS-COV-2 RNA SPEC QL NAA+PROBE: SIGNIFICANT CHANGE UP
SARS-COV-2 RNA SPEC QL NAA+PROBE: SIGNIFICANT CHANGE UP
SODIUM SERPL-SCNC: 136 MMOL/L — SIGNIFICANT CHANGE UP (ref 135–145)
SODIUM SERPL-SCNC: 136 MMOL/L — SIGNIFICANT CHANGE UP (ref 135–145)
SODIUM SERPL-SCNC: 138 MMOL/L — SIGNIFICANT CHANGE UP (ref 135–145)
SODIUM SERPL-SCNC: 138 MMOL/L — SIGNIFICANT CHANGE UP (ref 135–145)
SODIUM SERPL-SCNC: 139 MMOL/L — SIGNIFICANT CHANGE UP (ref 135–145)
SODIUM SERPL-SCNC: 140 MMOL/L — SIGNIFICANT CHANGE UP (ref 135–145)
SODIUM SERPL-SCNC: 140 MMOL/L — SIGNIFICANT CHANGE UP (ref 135–145)
SODIUM SERPL-SCNC: 142 MMOL/L — SIGNIFICANT CHANGE UP (ref 135–145)
SODIUM SERPL-SCNC: 142 MMOL/L — SIGNIFICANT CHANGE UP (ref 135–145)
SODIUM SERPL-SCNC: 144 MMOL/L — SIGNIFICANT CHANGE UP (ref 135–145)
SP GR SPEC: 1.02 — SIGNIFICANT CHANGE UP (ref 1–1.03)
SP GR SPEC: 1.02 — SIGNIFICANT CHANGE UP (ref 1–1.03)
SPECIMEN SOURCE: SIGNIFICANT CHANGE UP
TROPONIN I, HIGH SENSITIVITY RESULT: 108.4 NG/L — HIGH
TROPONIN I, HIGH SENSITIVITY RESULT: 108.4 NG/L — HIGH
TROPONIN I, HIGH SENSITIVITY RESULT: 111.2 NG/L — HIGH
TROPONIN I, HIGH SENSITIVITY RESULT: 111.2 NG/L — HIGH
TROPONIN I, HIGH SENSITIVITY RESULT: 120.5 NG/L — HIGH
TROPONIN I, HIGH SENSITIVITY RESULT: 120.5 NG/L — HIGH
UROBILINOGEN FLD QL: 1 MG/DL — SIGNIFICANT CHANGE UP (ref 0.2–1)
UROBILINOGEN FLD QL: 1 MG/DL — SIGNIFICANT CHANGE UP (ref 0.2–1)
WBC # BLD: 4.92 K/UL — SIGNIFICANT CHANGE UP (ref 3.8–10.5)
WBC # BLD: 4.92 K/UL — SIGNIFICANT CHANGE UP (ref 3.8–10.5)
WBC # BLD: 5.11 K/UL — SIGNIFICANT CHANGE UP (ref 3.8–10.5)
WBC # BLD: 5.11 K/UL — SIGNIFICANT CHANGE UP (ref 3.8–10.5)
WBC # BLD: 5.47 K/UL — SIGNIFICANT CHANGE UP (ref 3.8–10.5)
WBC # BLD: 5.47 K/UL — SIGNIFICANT CHANGE UP (ref 3.8–10.5)
WBC # BLD: 5.7 K/UL — SIGNIFICANT CHANGE UP (ref 3.8–10.5)
WBC # BLD: 5.7 K/UL — SIGNIFICANT CHANGE UP (ref 3.8–10.5)
WBC # BLD: 5.88 K/UL — SIGNIFICANT CHANGE UP (ref 3.8–10.5)
WBC # BLD: 5.88 K/UL — SIGNIFICANT CHANGE UP (ref 3.8–10.5)
WBC # BLD: 6.33 K/UL — SIGNIFICANT CHANGE UP (ref 3.8–10.5)
WBC # BLD: 6.33 K/UL — SIGNIFICANT CHANGE UP (ref 3.8–10.5)
WBC # BLD: 7.27 K/UL — SIGNIFICANT CHANGE UP (ref 3.8–10.5)
WBC # BLD: 7.27 K/UL — SIGNIFICANT CHANGE UP (ref 3.8–10.5)
WBC # BLD: 8.03 K/UL — SIGNIFICANT CHANGE UP (ref 3.8–10.5)
WBC # BLD: 8.03 K/UL — SIGNIFICANT CHANGE UP (ref 3.8–10.5)
WBC # FLD AUTO: 4.92 K/UL — SIGNIFICANT CHANGE UP (ref 3.8–10.5)
WBC # FLD AUTO: 4.92 K/UL — SIGNIFICANT CHANGE UP (ref 3.8–10.5)
WBC # FLD AUTO: 5.11 K/UL — SIGNIFICANT CHANGE UP (ref 3.8–10.5)
WBC # FLD AUTO: 5.11 K/UL — SIGNIFICANT CHANGE UP (ref 3.8–10.5)
WBC # FLD AUTO: 5.47 K/UL — SIGNIFICANT CHANGE UP (ref 3.8–10.5)
WBC # FLD AUTO: 5.47 K/UL — SIGNIFICANT CHANGE UP (ref 3.8–10.5)
WBC # FLD AUTO: 5.7 K/UL — SIGNIFICANT CHANGE UP (ref 3.8–10.5)
WBC # FLD AUTO: 5.7 K/UL — SIGNIFICANT CHANGE UP (ref 3.8–10.5)
WBC # FLD AUTO: 5.88 K/UL — SIGNIFICANT CHANGE UP (ref 3.8–10.5)
WBC # FLD AUTO: 5.88 K/UL — SIGNIFICANT CHANGE UP (ref 3.8–10.5)
WBC # FLD AUTO: 6.33 K/UL — SIGNIFICANT CHANGE UP (ref 3.8–10.5)
WBC # FLD AUTO: 6.33 K/UL — SIGNIFICANT CHANGE UP (ref 3.8–10.5)
WBC # FLD AUTO: 7.27 K/UL — SIGNIFICANT CHANGE UP (ref 3.8–10.5)
WBC # FLD AUTO: 7.27 K/UL — SIGNIFICANT CHANGE UP (ref 3.8–10.5)
WBC # FLD AUTO: 8.03 K/UL — SIGNIFICANT CHANGE UP (ref 3.8–10.5)
WBC # FLD AUTO: 8.03 K/UL — SIGNIFICANT CHANGE UP (ref 3.8–10.5)
WBC UR QL: 0 /HPF — SIGNIFICANT CHANGE UP (ref 0–5)
WBC UR QL: 0 /HPF — SIGNIFICANT CHANGE UP (ref 0–5)

## 2023-01-01 PROCEDURE — 99233 SBSQ HOSP IP/OBS HIGH 50: CPT

## 2023-01-01 PROCEDURE — 87641 MR-STAPH DNA AMP PROBE: CPT

## 2023-01-01 PROCEDURE — 99232 SBSQ HOSP IP/OBS MODERATE 35: CPT

## 2023-01-01 PROCEDURE — 36415 COLL VENOUS BLD VENIPUNCTURE: CPT

## 2023-01-01 PROCEDURE — 0225U NFCT DS DNA&RNA 21 SARSCOV2: CPT

## 2023-01-01 PROCEDURE — 99497 ADVNCD CARE PLAN 30 MIN: CPT | Mod: 25

## 2023-01-01 PROCEDURE — 99223 1ST HOSP IP/OBS HIGH 75: CPT

## 2023-01-01 PROCEDURE — 99232 SBSQ HOSP IP/OBS MODERATE 35: CPT | Mod: GC

## 2023-01-01 PROCEDURE — 82803 BLOOD GASES ANY COMBINATION: CPT

## 2023-01-01 PROCEDURE — 93306 TTE W/DOPPLER COMPLETE: CPT | Mod: 26

## 2023-01-01 PROCEDURE — 92612 ENDOSCOPY SWALLOW (FEES) VID: CPT

## 2023-01-01 PROCEDURE — 74230 X-RAY XM SWLNG FUNCJ C+: CPT | Mod: 26

## 2023-01-01 PROCEDURE — 96365 THER/PROPH/DIAG IV INF INIT: CPT

## 2023-01-01 PROCEDURE — 99285 EMERGENCY DEPT VISIT HI MDM: CPT

## 2023-01-01 PROCEDURE — 74230 X-RAY XM SWLNG FUNCJ C+: CPT

## 2023-01-01 PROCEDURE — 85610 PROTHROMBIN TIME: CPT

## 2023-01-01 PROCEDURE — 93005 ELECTROCARDIOGRAM TRACING: CPT

## 2023-01-01 PROCEDURE — 71045 X-RAY EXAM CHEST 1 VIEW: CPT | Mod: 26

## 2023-01-01 PROCEDURE — 83735 ASSAY OF MAGNESIUM: CPT

## 2023-01-01 PROCEDURE — 85025 COMPLETE CBC W/AUTO DIFF WBC: CPT

## 2023-01-01 PROCEDURE — 85730 THROMBOPLASTIN TIME PARTIAL: CPT

## 2023-01-01 PROCEDURE — 84484 ASSAY OF TROPONIN QUANT: CPT

## 2023-01-01 PROCEDURE — 80048 BASIC METABOLIC PNL TOTAL CA: CPT

## 2023-01-01 PROCEDURE — 99233 SBSQ HOSP IP/OBS HIGH 50: CPT | Mod: 25

## 2023-01-01 PROCEDURE — 87086 URINE CULTURE/COLONY COUNT: CPT

## 2023-01-01 PROCEDURE — 80053 COMPREHEN METABOLIC PANEL: CPT

## 2023-01-01 PROCEDURE — A9585: CPT

## 2023-01-01 PROCEDURE — 94760 N-INVAS EAR/PLS OXIMETRY 1: CPT

## 2023-01-01 PROCEDURE — 70450 CT HEAD/BRAIN W/O DYE: CPT

## 2023-01-01 PROCEDURE — 87640 STAPH A DNA AMP PROBE: CPT

## 2023-01-01 PROCEDURE — 83036 HEMOGLOBIN GLYCOSYLATED A1C: CPT

## 2023-01-01 PROCEDURE — 83605 ASSAY OF LACTIC ACID: CPT

## 2023-01-01 PROCEDURE — 99222 1ST HOSP IP/OBS MODERATE 55: CPT

## 2023-01-01 PROCEDURE — 92611 MOTION FLUOROSCOPY/SWALLOW: CPT

## 2023-01-01 PROCEDURE — 70553 MRI BRAIN STEM W/O & W/DYE: CPT | Mod: 26

## 2023-01-01 PROCEDURE — 97162 PT EVAL MOD COMPLEX 30 MIN: CPT

## 2023-01-01 PROCEDURE — 81001 URINALYSIS AUTO W/SCOPE: CPT

## 2023-01-01 PROCEDURE — 92610 EVALUATE SWALLOWING FUNCTION: CPT

## 2023-01-01 PROCEDURE — 70450 CT HEAD/BRAIN W/O DYE: CPT | Mod: 26

## 2023-01-01 PROCEDURE — 99233 SBSQ HOSP IP/OBS HIGH 50: CPT | Mod: GC

## 2023-01-01 PROCEDURE — 99231 SBSQ HOSP IP/OBS SF/LOW 25: CPT

## 2023-01-01 PROCEDURE — 87040 BLOOD CULTURE FOR BACTERIA: CPT

## 2023-01-01 PROCEDURE — 92526 ORAL FUNCTION THERAPY: CPT

## 2023-01-01 PROCEDURE — 70553 MRI BRAIN STEM W/O & W/DYE: CPT

## 2023-01-01 PROCEDURE — 85027 COMPLETE CBC AUTOMATED: CPT

## 2023-01-01 PROCEDURE — 96361 HYDRATE IV INFUSION ADD-ON: CPT

## 2023-01-01 PROCEDURE — 84100 ASSAY OF PHOSPHORUS: CPT

## 2023-01-01 PROCEDURE — 93306 TTE W/DOPPLER COMPLETE: CPT

## 2023-01-01 PROCEDURE — 71045 X-RAY EXAM CHEST 1 VIEW: CPT

## 2023-01-01 PROCEDURE — 94640 AIRWAY INHALATION TREATMENT: CPT

## 2023-01-01 PROCEDURE — 82962 GLUCOSE BLOOD TEST: CPT

## 2023-01-01 RX ORDER — ACETAMINOPHEN 500 MG
650 TABLET ORAL EVERY 4 HOURS
Refills: 0 | Status: DISCONTINUED | OUTPATIENT
Start: 2023-01-01 | End: 2023-01-01

## 2023-01-01 RX ORDER — IPRATROPIUM/ALBUTEROL SULFATE 18-103MCG
3 AEROSOL WITH ADAPTER (GRAM) INHALATION EVERY 6 HOURS
Refills: 0 | Status: DISCONTINUED | OUTPATIENT
Start: 2023-01-01 | End: 2023-01-01

## 2023-01-01 RX ORDER — SODIUM CHLORIDE 9 MG/ML
1000 INJECTION, SOLUTION INTRAVENOUS
Refills: 0 | Status: COMPLETED | OUTPATIENT
Start: 2023-01-01 | End: 2023-01-01

## 2023-01-01 RX ORDER — MUPIROCIN 20 MG/G
1 OINTMENT TOPICAL
Refills: 0 | Status: DISCONTINUED | OUTPATIENT
Start: 2023-01-01 | End: 2023-01-01

## 2023-01-01 RX ORDER — ROBINUL 0.2 MG/ML
0.4 INJECTION INTRAMUSCULAR; INTRAVENOUS EVERY 6 HOURS
Refills: 0 | Status: DISCONTINUED | OUTPATIENT
Start: 2023-01-01 | End: 2023-01-01

## 2023-01-01 RX ORDER — SODIUM CHLORIDE 9 MG/ML
1000 INJECTION, SOLUTION INTRAVENOUS
Refills: 0 | Status: DISCONTINUED | OUTPATIENT
Start: 2023-01-01 | End: 2023-01-01

## 2023-01-01 RX ORDER — CEFTRIAXONE 500 MG/1
1000 INJECTION, POWDER, FOR SOLUTION INTRAMUSCULAR; INTRAVENOUS ONCE
Refills: 0 | Status: COMPLETED | OUTPATIENT
Start: 2023-01-01 | End: 2023-01-01

## 2023-01-01 RX ORDER — DONEPEZIL HYDROCHLORIDE 10 MG/1
1 TABLET, FILM COATED ORAL
Refills: 0 | DISCHARGE

## 2023-01-01 RX ORDER — ACETAMINOPHEN 500 MG
1000 TABLET ORAL ONCE
Refills: 0 | Status: COMPLETED | OUTPATIENT
Start: 2023-01-01 | End: 2023-01-01

## 2023-01-01 RX ORDER — SODIUM CHLORIDE 9 MG/ML
4 INJECTION INTRAMUSCULAR; INTRAVENOUS; SUBCUTANEOUS EVERY 6 HOURS
Refills: 0 | Status: DISCONTINUED | OUTPATIENT
Start: 2023-01-01 | End: 2023-01-01

## 2023-01-01 RX ORDER — MORPHINE SULFATE 50 MG/1
2 CAPSULE, EXTENDED RELEASE ORAL EVERY 4 HOURS
Refills: 0 | Status: DISCONTINUED | OUTPATIENT
Start: 2023-01-01 | End: 2023-01-01

## 2023-01-01 RX ORDER — ACETAMINOPHEN 500 MG
650 TABLET ORAL EVERY 6 HOURS
Refills: 0 | Status: DISCONTINUED | OUTPATIENT
Start: 2023-01-01 | End: 2023-01-01

## 2023-01-01 RX ORDER — SODIUM CHLORIDE 9 MG/ML
2400 INJECTION INTRAMUSCULAR; INTRAVENOUS; SUBCUTANEOUS ONCE
Refills: 0 | Status: COMPLETED | OUTPATIENT
Start: 2023-01-01 | End: 2023-01-01

## 2023-01-01 RX ORDER — VALACYCLOVIR 500 MG/1
1000 TABLET, FILM COATED ORAL EVERY 8 HOURS
Refills: 0 | Status: DISCONTINUED | OUTPATIENT
Start: 2023-01-01 | End: 2023-01-01

## 2023-01-01 RX ORDER — ACYCLOVIR SODIUM 500 MG
400 VIAL (EA) INTRAVENOUS
Refills: 0 | Status: DISCONTINUED | OUTPATIENT
Start: 2023-01-01 | End: 2023-01-01

## 2023-01-01 RX ORDER — TAMSULOSIN HYDROCHLORIDE 0.4 MG/1
1 CAPSULE ORAL
Refills: 0 | DISCHARGE

## 2023-01-01 RX ORDER — OXYMETAZOLINE HYDROCHLORIDE 0.5 MG/ML
1 SPRAY NASAL ONCE
Refills: 0 | Status: COMPLETED | OUTPATIENT
Start: 2023-01-01 | End: 2023-01-01

## 2023-01-01 RX ORDER — SODIUM CHLORIDE 9 MG/ML
1000 INJECTION INTRAMUSCULAR; INTRAVENOUS; SUBCUTANEOUS
Refills: 0 | Status: DISCONTINUED | OUTPATIENT
Start: 2023-01-01 | End: 2023-01-01

## 2023-01-01 RX ORDER — AMLODIPINE BESYLATE 2.5 MG/1
5 TABLET ORAL ONCE
Refills: 0 | Status: COMPLETED | OUTPATIENT
Start: 2023-01-01 | End: 2023-01-01

## 2023-01-01 RX ORDER — DOCUSATE SODIUM 100 MG
15 CAPSULE ORAL
Refills: 0 | DISCHARGE

## 2023-01-01 RX ORDER — BENZOCAINE AND MENTHOL 5; 1 G/100ML; G/100ML
1 LIQUID ORAL ONCE
Refills: 0 | Status: DISCONTINUED | OUTPATIENT
Start: 2023-01-01 | End: 2023-01-01

## 2023-01-01 RX ORDER — ASPIRIN/CALCIUM CARB/MAGNESIUM 324 MG
300 TABLET ORAL DAILY
Refills: 0 | Status: DISCONTINUED | OUTPATIENT
Start: 2023-01-01 | End: 2023-01-01

## 2023-01-01 RX ORDER — BUDESONIDE AND FORMOTEROL FUMARATE DIHYDRATE 160; 4.5 UG/1; UG/1
2 AEROSOL RESPIRATORY (INHALATION)
Refills: 0 | Status: DISCONTINUED | OUTPATIENT
Start: 2023-01-01 | End: 2023-01-01

## 2023-01-01 RX ORDER — SODIUM CHLORIDE 0.65 %
1 AEROSOL, SPRAY (ML) NASAL
Refills: 0 | Status: DISCONTINUED | OUTPATIENT
Start: 2023-01-01 | End: 2023-01-01

## 2023-01-01 RX ORDER — LIDOCAINE 4 G/100G
1 CREAM TOPICAL ONCE
Refills: 0 | Status: COMPLETED | OUTPATIENT
Start: 2023-01-01 | End: 2023-01-01

## 2023-01-01 RX ORDER — RISPERIDONE 4 MG/1
1 TABLET ORAL
Refills: 0 | DISCHARGE

## 2023-01-01 RX ORDER — POTASSIUM PHOSPHATE, MONOBASIC POTASSIUM PHOSPHATE, DIBASIC 236; 224 MG/ML; MG/ML
30 INJECTION, SOLUTION INTRAVENOUS ONCE
Refills: 0 | Status: COMPLETED | OUTPATIENT
Start: 2023-01-01 | End: 2023-01-01

## 2023-01-01 RX ORDER — PANTOPRAZOLE SODIUM 20 MG/1
1 TABLET, DELAYED RELEASE ORAL
Refills: 0 | DISCHARGE

## 2023-01-01 RX ORDER — ALLOPURINOL 300 MG
1 TABLET ORAL
Refills: 0 | DISCHARGE

## 2023-01-01 RX ORDER — RISPERIDONE 4 MG/1
0.5 TABLET ORAL AT BEDTIME
Refills: 0 | Status: DISCONTINUED | OUTPATIENT
Start: 2023-01-01 | End: 2023-01-01

## 2023-01-01 RX ORDER — GUAIFENESIN/DEXTROMETHORPHAN 600MG-30MG
10 TABLET, EXTENDED RELEASE 12 HR ORAL EVERY 6 HOURS
Refills: 0 | Status: DISCONTINUED | OUTPATIENT
Start: 2023-01-01 | End: 2023-01-01

## 2023-01-01 RX ORDER — DULOXETINE HYDROCHLORIDE 30 MG/1
1 CAPSULE, DELAYED RELEASE ORAL
Refills: 0 | DISCHARGE

## 2023-01-01 RX ORDER — SENNA PLUS 8.6 MG/1
2 TABLET ORAL AT BEDTIME
Refills: 0 | Status: DISCONTINUED | OUTPATIENT
Start: 2023-01-01 | End: 2023-01-01

## 2023-01-01 RX ORDER — ENOXAPARIN SODIUM 100 MG/ML
40 INJECTION SUBCUTANEOUS EVERY 24 HOURS
Refills: 0 | Status: DISCONTINUED | OUTPATIENT
Start: 2023-01-01 | End: 2023-01-01

## 2023-01-01 RX ORDER — ALBUTEROL 90 UG/1
2 AEROSOL, METERED ORAL EVERY 6 HOURS
Refills: 0 | Status: DISCONTINUED | OUTPATIENT
Start: 2023-01-01 | End: 2023-01-01

## 2023-01-01 RX ORDER — LOSARTAN POTASSIUM 100 MG/1
1 TABLET, FILM COATED ORAL
Refills: 0 | DISCHARGE

## 2023-01-01 RX ORDER — ALBUTEROL 90 UG/1
2 AEROSOL, METERED ORAL
Refills: 0 | DISCHARGE

## 2023-01-01 RX ORDER — LOSARTAN POTASSIUM 100 MG/1
50 TABLET, FILM COATED ORAL DAILY
Refills: 0 | Status: DISCONTINUED | OUTPATIENT
Start: 2023-01-01 | End: 2023-01-01

## 2023-01-01 RX ORDER — POLYMYXIN B SULF/TRIMETHOPRIM 10000-1/ML
1 DROPS OPHTHALMIC (EYE)
Refills: 0 | Status: DISCONTINUED | OUTPATIENT
Start: 2023-01-01 | End: 2023-01-01

## 2023-01-01 RX ORDER — SENNA PLUS 8.6 MG/1
1 TABLET ORAL
Refills: 0 | DISCHARGE

## 2023-01-01 RX ORDER — FLUTICASONE FUROATE, UMECLIDINIUM BROMIDE AND VILANTEROL TRIFENATATE 200; 62.5; 25 UG/1; UG/1; UG/1
1 POWDER RESPIRATORY (INHALATION)
Refills: 0 | DISCHARGE

## 2023-01-01 RX ORDER — POLYMYXIN B SULF/TRIMETHOPRIM 10000-1/ML
1 DROPS OPHTHALMIC (EYE) EVERY 6 HOURS
Refills: 0 | Status: DISCONTINUED | OUTPATIENT
Start: 2023-01-01 | End: 2023-01-01

## 2023-01-01 RX ORDER — MELOXICAM 15 MG/1
1 TABLET ORAL
Refills: 0 | DISCHARGE

## 2023-01-01 RX ORDER — PANTOPRAZOLE SODIUM 20 MG/1
40 TABLET, DELAYED RELEASE ORAL
Refills: 0 | Status: DISCONTINUED | OUTPATIENT
Start: 2023-01-01 | End: 2023-01-01

## 2023-01-01 RX ORDER — CHLORHEXIDINE GLUCONATE 213 G/1000ML
1 SOLUTION TOPICAL
Refills: 0 | Status: DISCONTINUED | OUTPATIENT
Start: 2023-01-01 | End: 2023-01-01

## 2023-01-01 RX ORDER — MORPHINE SULFATE 50 MG/1
2 CAPSULE, EXTENDED RELEASE ORAL
Refills: 0 | Status: DISCONTINUED | OUTPATIENT
Start: 2023-01-01 | End: 2023-01-01

## 2023-01-01 RX ORDER — ALLOPURINOL 300 MG
300 TABLET ORAL DAILY
Refills: 0 | Status: DISCONTINUED | OUTPATIENT
Start: 2023-01-01 | End: 2023-01-01

## 2023-01-01 RX ORDER — RISPERIDONE 4 MG/1
0.5 TABLET ORAL
Refills: 0 | Status: DISCONTINUED | OUTPATIENT
Start: 2023-01-01 | End: 2023-01-01

## 2023-01-01 RX ORDER — DONEPEZIL HYDROCHLORIDE 10 MG/1
5 TABLET, FILM COATED ORAL AT BEDTIME
Refills: 0 | Status: DISCONTINUED | OUTPATIENT
Start: 2023-01-01 | End: 2023-01-01

## 2023-01-01 RX ORDER — MORPHINE SULFATE 50 MG/1
1 CAPSULE, EXTENDED RELEASE ORAL EVERY 6 HOURS
Refills: 0 | Status: DISCONTINUED | OUTPATIENT
Start: 2023-01-01 | End: 2023-01-01

## 2023-01-01 RX ADMIN — LOSARTAN POTASSIUM 50 MILLIGRAM(S): 100 TABLET, FILM COATED ORAL at 06:13

## 2023-01-01 RX ADMIN — SENNA PLUS 2 TABLET(S): 8.6 TABLET ORAL at 22:21

## 2023-01-01 RX ADMIN — Medication 1 DROP(S): at 11:47

## 2023-01-01 RX ADMIN — LOSARTAN POTASSIUM 50 MILLIGRAM(S): 100 TABLET, FILM COATED ORAL at 05:50

## 2023-01-01 RX ADMIN — Medication 3 MILLILITER(S): at 09:22

## 2023-01-01 RX ADMIN — Medication 1 DROP(S): at 12:18

## 2023-01-01 RX ADMIN — Medication 1 DROP(S): at 18:12

## 2023-01-01 RX ADMIN — LOSARTAN POTASSIUM 50 MILLIGRAM(S): 100 TABLET, FILM COATED ORAL at 06:35

## 2023-01-01 RX ADMIN — VALACYCLOVIR 1000 MILLIGRAM(S): 500 TABLET, FILM COATED ORAL at 13:30

## 2023-01-01 RX ADMIN — RISPERIDONE 0.5 MILLIGRAM(S): 4 TABLET ORAL at 17:29

## 2023-01-01 RX ADMIN — RISPERIDONE 0.5 MILLIGRAM(S): 4 TABLET ORAL at 07:15

## 2023-01-01 RX ADMIN — Medication 1 DROP(S): at 23:31

## 2023-01-01 RX ADMIN — SENNA PLUS 2 TABLET(S): 8.6 TABLET ORAL at 21:21

## 2023-01-01 RX ADMIN — Medication 60 MILLIGRAM(S): at 06:35

## 2023-01-01 RX ADMIN — Medication 3 MILLILITER(S): at 02:38

## 2023-01-01 RX ADMIN — RISPERIDONE 0.5 MILLIGRAM(S): 4 TABLET ORAL at 18:53

## 2023-01-01 RX ADMIN — Medication 1 DROP(S): at 17:17

## 2023-01-01 RX ADMIN — VALACYCLOVIR 1000 MILLIGRAM(S): 500 TABLET, FILM COATED ORAL at 06:53

## 2023-01-01 RX ADMIN — ENOXAPARIN SODIUM 40 MILLIGRAM(S): 100 INJECTION SUBCUTANEOUS at 13:18

## 2023-01-01 RX ADMIN — SODIUM CHLORIDE 4 MILLILITER(S): 9 INJECTION INTRAMUSCULAR; INTRAVENOUS; SUBCUTANEOUS at 02:45

## 2023-01-01 RX ADMIN — Medication 200 MILLIGRAM(S): at 23:05

## 2023-01-01 RX ADMIN — MORPHINE SULFATE 2 MILLIGRAM(S): 50 CAPSULE, EXTENDED RELEASE ORAL at 14:20

## 2023-01-01 RX ADMIN — Medication 1 DROP(S): at 17:29

## 2023-01-01 RX ADMIN — SODIUM CHLORIDE 4 MILLILITER(S): 9 INJECTION INTRAMUSCULAR; INTRAVENOUS; SUBCUTANEOUS at 02:32

## 2023-01-01 RX ADMIN — Medication 3 MILLILITER(S): at 15:21

## 2023-01-01 RX ADMIN — Medication 3 MILLILITER(S): at 02:34

## 2023-01-01 RX ADMIN — Medication 400 MILLIGRAM(S): at 13:01

## 2023-01-01 RX ADMIN — MORPHINE SULFATE 1 MILLIGRAM(S): 50 CAPSULE, EXTENDED RELEASE ORAL at 15:50

## 2023-01-01 RX ADMIN — Medication 1 DROP(S): at 13:19

## 2023-01-01 RX ADMIN — OXYMETAZOLINE HYDROCHLORIDE 1 SPRAY(S): 0.5 SPRAY NASAL at 12:15

## 2023-01-01 RX ADMIN — Medication 60 MILLIGRAM(S): at 06:56

## 2023-01-01 RX ADMIN — Medication 650 MILLIGRAM(S): at 17:36

## 2023-01-01 RX ADMIN — Medication 3 MILLILITER(S): at 20:22

## 2023-01-01 RX ADMIN — Medication 1 DROP(S): at 12:47

## 2023-01-01 RX ADMIN — SENNA PLUS 2 TABLET(S): 8.6 TABLET ORAL at 23:04

## 2023-01-01 RX ADMIN — VALACYCLOVIR 1000 MILLIGRAM(S): 500 TABLET, FILM COATED ORAL at 15:45

## 2023-01-01 RX ADMIN — ENOXAPARIN SODIUM 40 MILLIGRAM(S): 100 INJECTION SUBCUTANEOUS at 12:46

## 2023-01-01 RX ADMIN — Medication 10 MILLILITER(S): at 11:37

## 2023-01-01 RX ADMIN — POTASSIUM PHOSPHATE, MONOBASIC POTASSIUM PHOSPHATE, DIBASIC 83.33 MILLIMOLE(S): 236; 224 INJECTION, SOLUTION INTRAVENOUS at 09:55

## 2023-01-01 RX ADMIN — Medication 60 MILLIGRAM(S): at 05:50

## 2023-01-01 RX ADMIN — Medication 1 DROP(S): at 17:46

## 2023-01-01 RX ADMIN — Medication 3 MILLILITER(S): at 15:12

## 2023-01-01 RX ADMIN — BUDESONIDE AND FORMOTEROL FUMARATE DIHYDRATE 2 PUFF(S): 160; 4.5 AEROSOL RESPIRATORY (INHALATION) at 21:10

## 2023-01-01 RX ADMIN — Medication 200 MILLIGRAM(S): at 12:46

## 2023-01-01 RX ADMIN — VALACYCLOVIR 1000 MILLIGRAM(S): 500 TABLET, FILM COATED ORAL at 07:16

## 2023-01-01 RX ADMIN — SODIUM CHLORIDE 4 MILLILITER(S): 9 INJECTION INTRAMUSCULAR; INTRAVENOUS; SUBCUTANEOUS at 09:24

## 2023-01-01 RX ADMIN — Medication 1 DROP(S): at 06:15

## 2023-01-01 RX ADMIN — Medication 600 MILLIGRAM(S): at 05:50

## 2023-01-01 RX ADMIN — RISPERIDONE 0.5 MILLIGRAM(S): 4 TABLET ORAL at 05:51

## 2023-01-01 RX ADMIN — Medication 3 MILLILITER(S): at 20:03

## 2023-01-01 RX ADMIN — Medication 400 MILLIGRAM(S): at 00:39

## 2023-01-01 RX ADMIN — ENOXAPARIN SODIUM 40 MILLIGRAM(S): 100 INJECTION SUBCUTANEOUS at 14:02

## 2023-01-01 RX ADMIN — SODIUM CHLORIDE 4 MILLILITER(S): 9 INJECTION INTRAMUSCULAR; INTRAVENOUS; SUBCUTANEOUS at 20:29

## 2023-01-01 RX ADMIN — Medication 1 DROP(S): at 01:15

## 2023-01-01 RX ADMIN — VALACYCLOVIR 1000 MILLIGRAM(S): 500 TABLET, FILM COATED ORAL at 14:01

## 2023-01-01 RX ADMIN — MUPIROCIN 1 APPLICATION(S): 20 OINTMENT TOPICAL at 18:31

## 2023-01-01 RX ADMIN — Medication 1 DROP(S): at 23:41

## 2023-01-01 RX ADMIN — RISPERIDONE 0.5 MILLIGRAM(S): 4 TABLET ORAL at 05:58

## 2023-01-01 RX ADMIN — Medication 1 DROP(S): at 18:30

## 2023-01-01 RX ADMIN — Medication 200 MILLIGRAM(S): at 12:18

## 2023-01-01 RX ADMIN — SODIUM CHLORIDE 4 MILLILITER(S): 9 INJECTION INTRAMUSCULAR; INTRAVENOUS; SUBCUTANEOUS at 03:29

## 2023-01-01 RX ADMIN — Medication 1 DROP(S): at 17:05

## 2023-01-01 RX ADMIN — Medication 650 MILLIGRAM(S): at 16:59

## 2023-01-01 RX ADMIN — DONEPEZIL HYDROCHLORIDE 5 MILLIGRAM(S): 10 TABLET, FILM COATED ORAL at 22:55

## 2023-01-01 RX ADMIN — Medication 650 MILLIGRAM(S): at 06:42

## 2023-01-01 RX ADMIN — PANTOPRAZOLE SODIUM 40 MILLIGRAM(S): 20 TABLET, DELAYED RELEASE ORAL at 05:59

## 2023-01-01 RX ADMIN — LOSARTAN POTASSIUM 50 MILLIGRAM(S): 100 TABLET, FILM COATED ORAL at 05:25

## 2023-01-01 RX ADMIN — Medication 3 MILLILITER(S): at 20:17

## 2023-01-01 RX ADMIN — SODIUM CHLORIDE 4 MILLILITER(S): 9 INJECTION INTRAMUSCULAR; INTRAVENOUS; SUBCUTANEOUS at 15:30

## 2023-01-01 RX ADMIN — DONEPEZIL HYDROCHLORIDE 5 MILLIGRAM(S): 10 TABLET, FILM COATED ORAL at 22:01

## 2023-01-01 RX ADMIN — Medication 1 DROP(S): at 00:32

## 2023-01-01 RX ADMIN — SODIUM CHLORIDE 70 MILLILITER(S): 9 INJECTION, SOLUTION INTRAVENOUS at 23:28

## 2023-01-01 RX ADMIN — Medication 1 DROP(S): at 13:30

## 2023-01-01 RX ADMIN — Medication 650 MILLIGRAM(S): at 17:14

## 2023-01-01 RX ADMIN — Medication 10 MILLILITER(S): at 18:11

## 2023-01-01 RX ADMIN — Medication 200 MILLIGRAM(S): at 17:33

## 2023-01-01 RX ADMIN — SODIUM CHLORIDE 4 MILLILITER(S): 9 INJECTION INTRAMUSCULAR; INTRAVENOUS; SUBCUTANEOUS at 20:40

## 2023-01-01 RX ADMIN — Medication 60 MILLIGRAM(S): at 06:15

## 2023-01-01 RX ADMIN — Medication 3 MILLILITER(S): at 20:00

## 2023-01-01 RX ADMIN — Medication 3 MILLILITER(S): at 08:59

## 2023-01-01 RX ADMIN — ENOXAPARIN SODIUM 40 MILLIGRAM(S): 100 INJECTION SUBCUTANEOUS at 13:30

## 2023-01-01 RX ADMIN — VALACYCLOVIR 1000 MILLIGRAM(S): 500 TABLET, FILM COATED ORAL at 23:04

## 2023-01-01 RX ADMIN — Medication 300 MILLIGRAM(S): at 12:46

## 2023-01-01 RX ADMIN — ENOXAPARIN SODIUM 40 MILLIGRAM(S): 100 INJECTION SUBCUTANEOUS at 12:18

## 2023-01-01 RX ADMIN — RISPERIDONE 0.5 MILLIGRAM(S): 4 TABLET ORAL at 06:15

## 2023-01-01 RX ADMIN — Medication 600 MILLIGRAM(S): at 06:54

## 2023-01-01 RX ADMIN — SENNA PLUS 2 TABLET(S): 8.6 TABLET ORAL at 21:15

## 2023-01-01 RX ADMIN — SODIUM CHLORIDE 4 MILLILITER(S): 9 INJECTION INTRAMUSCULAR; INTRAVENOUS; SUBCUTANEOUS at 03:12

## 2023-01-01 RX ADMIN — SODIUM CHLORIDE 70 MILLILITER(S): 9 INJECTION, SOLUTION INTRAVENOUS at 22:03

## 2023-01-01 RX ADMIN — SODIUM CHLORIDE 75 MILLILITER(S): 9 INJECTION, SOLUTION INTRAVENOUS at 11:44

## 2023-01-01 RX ADMIN — SENNA PLUS 2 TABLET(S): 8.6 TABLET ORAL at 21:22

## 2023-01-01 RX ADMIN — ALBUTEROL 2 PUFF(S): 90 AEROSOL, METERED ORAL at 22:00

## 2023-01-01 RX ADMIN — ENOXAPARIN SODIUM 40 MILLIGRAM(S): 100 INJECTION SUBCUTANEOUS at 11:18

## 2023-01-01 RX ADMIN — Medication 1 SPRAY(S): at 13:01

## 2023-01-01 RX ADMIN — VALACYCLOVIR 1000 MILLIGRAM(S): 500 TABLET, FILM COATED ORAL at 06:35

## 2023-01-01 RX ADMIN — Medication 1 DROP(S): at 23:28

## 2023-01-01 RX ADMIN — Medication 1 DROP(S): at 00:43

## 2023-01-01 RX ADMIN — Medication 40 MILLIGRAM(S): at 12:34

## 2023-01-01 RX ADMIN — Medication 3 MILLILITER(S): at 02:48

## 2023-01-01 RX ADMIN — SODIUM CHLORIDE 4 MILLILITER(S): 9 INJECTION INTRAMUSCULAR; INTRAVENOUS; SUBCUTANEOUS at 20:04

## 2023-01-01 RX ADMIN — Medication 200 MILLIGRAM(S): at 11:18

## 2023-01-01 RX ADMIN — SODIUM CHLORIDE 4 MILLILITER(S): 9 INJECTION INTRAMUSCULAR; INTRAVENOUS; SUBCUTANEOUS at 15:35

## 2023-01-01 RX ADMIN — Medication 1 DROP(S): at 05:52

## 2023-01-01 RX ADMIN — Medication 200 MILLIGRAM(S): at 05:49

## 2023-01-01 RX ADMIN — LOSARTAN POTASSIUM 50 MILLIGRAM(S): 100 TABLET, FILM COATED ORAL at 07:15

## 2023-01-01 RX ADMIN — VALACYCLOVIR 1000 MILLIGRAM(S): 500 TABLET, FILM COATED ORAL at 05:50

## 2023-01-01 RX ADMIN — Medication 3 MILLILITER(S): at 14:25

## 2023-01-01 RX ADMIN — LOSARTAN POTASSIUM 50 MILLIGRAM(S): 100 TABLET, FILM COATED ORAL at 18:12

## 2023-01-01 RX ADMIN — Medication 200 MILLIGRAM(S): at 17:30

## 2023-01-01 RX ADMIN — LIDOCAINE 1 APPLICATION(S): 4 CREAM TOPICAL at 14:29

## 2023-01-01 RX ADMIN — SODIUM CHLORIDE 4 MILLILITER(S): 9 INJECTION INTRAMUSCULAR; INTRAVENOUS; SUBCUTANEOUS at 02:49

## 2023-01-01 RX ADMIN — DONEPEZIL HYDROCHLORIDE 5 MILLIGRAM(S): 10 TABLET, FILM COATED ORAL at 21:15

## 2023-01-01 RX ADMIN — Medication 3 MILLILITER(S): at 15:18

## 2023-01-01 RX ADMIN — Medication 650 MILLIGRAM(S): at 12:57

## 2023-01-01 RX ADMIN — Medication 3 MILLILITER(S): at 15:29

## 2023-01-01 RX ADMIN — SODIUM CHLORIDE 4 MILLILITER(S): 9 INJECTION INTRAMUSCULAR; INTRAVENOUS; SUBCUTANEOUS at 20:31

## 2023-01-01 RX ADMIN — Medication 1 DROP(S): at 06:42

## 2023-01-01 RX ADMIN — Medication 60 MILLIGRAM(S): at 06:13

## 2023-01-01 RX ADMIN — SODIUM CHLORIDE 4 MILLILITER(S): 9 INJECTION INTRAMUSCULAR; INTRAVENOUS; SUBCUTANEOUS at 08:38

## 2023-01-01 RX ADMIN — Medication 200 MILLIGRAM(S): at 06:13

## 2023-01-01 RX ADMIN — Medication 1 DROP(S): at 06:00

## 2023-01-01 RX ADMIN — Medication 300 MILLIGRAM(S): at 18:12

## 2023-01-01 RX ADMIN — Medication 200 MILLIGRAM(S): at 06:15

## 2023-01-01 RX ADMIN — PANTOPRAZOLE SODIUM 40 MILLIGRAM(S): 20 TABLET, DELAYED RELEASE ORAL at 06:53

## 2023-01-01 RX ADMIN — RISPERIDONE 0.5 MILLIGRAM(S): 4 TABLET ORAL at 17:05

## 2023-01-01 RX ADMIN — SODIUM CHLORIDE 4 MILLILITER(S): 9 INJECTION INTRAMUSCULAR; INTRAVENOUS; SUBCUTANEOUS at 15:21

## 2023-01-01 RX ADMIN — Medication 200 MILLIGRAM(S): at 18:54

## 2023-01-01 RX ADMIN — PANTOPRAZOLE SODIUM 40 MILLIGRAM(S): 20 TABLET, DELAYED RELEASE ORAL at 05:25

## 2023-01-01 RX ADMIN — Medication 1 DROP(S): at 17:25

## 2023-01-01 RX ADMIN — SODIUM CHLORIDE 70 MILLILITER(S): 9 INJECTION, SOLUTION INTRAVENOUS at 14:57

## 2023-01-01 RX ADMIN — SODIUM CHLORIDE 4 MILLILITER(S): 9 INJECTION INTRAMUSCULAR; INTRAVENOUS; SUBCUTANEOUS at 15:27

## 2023-01-01 RX ADMIN — Medication 1 DROP(S): at 23:27

## 2023-01-01 RX ADMIN — Medication 1 DROP(S): at 06:14

## 2023-01-01 RX ADMIN — SODIUM CHLORIDE 4 MILLILITER(S): 9 INJECTION INTRAMUSCULAR; INTRAVENOUS; SUBCUTANEOUS at 20:13

## 2023-01-01 RX ADMIN — ENOXAPARIN SODIUM 40 MILLIGRAM(S): 100 INJECTION SUBCUTANEOUS at 12:33

## 2023-01-01 RX ADMIN — Medication 400 MILLIGRAM(S): at 14:24

## 2023-01-01 RX ADMIN — Medication 300 MILLIGRAM(S): at 11:47

## 2023-01-01 RX ADMIN — VALACYCLOVIR 1000 MILLIGRAM(S): 500 TABLET, FILM COATED ORAL at 13:54

## 2023-01-01 RX ADMIN — Medication 1 DROP(S): at 18:54

## 2023-01-01 RX ADMIN — Medication 650 MILLIGRAM(S): at 23:06

## 2023-01-01 RX ADMIN — RISPERIDONE 0.5 MILLIGRAM(S): 4 TABLET ORAL at 17:17

## 2023-01-01 RX ADMIN — SODIUM CHLORIDE 4 MILLILITER(S): 9 INJECTION INTRAMUSCULAR; INTRAVENOUS; SUBCUTANEOUS at 09:23

## 2023-01-01 RX ADMIN — Medication 200 MILLIGRAM(S): at 17:05

## 2023-01-01 RX ADMIN — Medication 300 MILLIGRAM(S): at 12:31

## 2023-01-01 RX ADMIN — CHLORHEXIDINE GLUCONATE 1 APPLICATION(S): 213 SOLUTION TOPICAL at 06:41

## 2023-01-01 RX ADMIN — ENOXAPARIN SODIUM 40 MILLIGRAM(S): 100 INJECTION SUBCUTANEOUS at 12:31

## 2023-01-01 RX ADMIN — Medication 650 MILLIGRAM(S): at 07:00

## 2023-01-01 RX ADMIN — VALACYCLOVIR 1000 MILLIGRAM(S): 500 TABLET, FILM COATED ORAL at 22:30

## 2023-01-01 RX ADMIN — Medication 60 MILLIGRAM(S): at 05:58

## 2023-01-01 RX ADMIN — Medication 650 MILLIGRAM(S): at 13:34

## 2023-01-01 RX ADMIN — DONEPEZIL HYDROCHLORIDE 5 MILLIGRAM(S): 10 TABLET, FILM COATED ORAL at 21:21

## 2023-01-01 RX ADMIN — Medication 1 DROP(S): at 23:06

## 2023-01-01 RX ADMIN — SODIUM CHLORIDE 4 MILLILITER(S): 9 INJECTION INTRAMUSCULAR; INTRAVENOUS; SUBCUTANEOUS at 15:06

## 2023-01-01 RX ADMIN — MUPIROCIN 1 APPLICATION(S): 20 OINTMENT TOPICAL at 06:41

## 2023-01-01 RX ADMIN — Medication 3 MILLILITER(S): at 08:55

## 2023-01-01 RX ADMIN — MORPHINE SULFATE 2 MILLIGRAM(S): 50 CAPSULE, EXTENDED RELEASE ORAL at 18:50

## 2023-01-01 RX ADMIN — ROBINUL 0.4 MILLIGRAM(S): 0.2 INJECTION INTRAMUSCULAR; INTRAVENOUS at 15:12

## 2023-01-01 RX ADMIN — SODIUM CHLORIDE 100 MILLILITER(S): 9 INJECTION INTRAMUSCULAR; INTRAVENOUS; SUBCUTANEOUS at 12:36

## 2023-01-01 RX ADMIN — Medication 650 MILLIGRAM(S): at 16:01

## 2023-01-01 RX ADMIN — MORPHINE SULFATE 2 MILLIGRAM(S): 50 CAPSULE, EXTENDED RELEASE ORAL at 19:14

## 2023-01-01 RX ADMIN — Medication 3 MILLILITER(S): at 08:38

## 2023-01-01 RX ADMIN — Medication 1 DROP(S): at 13:02

## 2023-01-01 RX ADMIN — SODIUM CHLORIDE 75 MILLILITER(S): 9 INJECTION, SOLUTION INTRAVENOUS at 10:16

## 2023-01-01 RX ADMIN — Medication 1 DROP(S): at 17:33

## 2023-01-01 RX ADMIN — VALACYCLOVIR 1000 MILLIGRAM(S): 500 TABLET, FILM COATED ORAL at 06:14

## 2023-01-01 RX ADMIN — Medication 1 DROP(S): at 05:54

## 2023-01-01 RX ADMIN — Medication 10 MILLILITER(S): at 05:25

## 2023-01-01 RX ADMIN — Medication 1 DROP(S): at 17:02

## 2023-01-01 RX ADMIN — RISPERIDONE 0.5 MILLIGRAM(S): 4 TABLET ORAL at 06:35

## 2023-01-01 RX ADMIN — Medication 3 MILLILITER(S): at 09:23

## 2023-01-01 RX ADMIN — Medication 3 MILLILITER(S): at 03:12

## 2023-01-01 RX ADMIN — Medication 3 MILLILITER(S): at 15:27

## 2023-01-01 RX ADMIN — SODIUM CHLORIDE 4 MILLILITER(S): 9 INJECTION INTRAMUSCULAR; INTRAVENOUS; SUBCUTANEOUS at 03:22

## 2023-01-01 RX ADMIN — SENNA PLUS 2 TABLET(S): 8.6 TABLET ORAL at 22:00

## 2023-01-01 RX ADMIN — Medication 300 MILLIGRAM(S): at 11:18

## 2023-01-01 RX ADMIN — RISPERIDONE 0.5 MILLIGRAM(S): 4 TABLET ORAL at 21:21

## 2023-01-01 RX ADMIN — Medication 1 DROP(S): at 05:26

## 2023-01-01 RX ADMIN — LOSARTAN POTASSIUM 50 MILLIGRAM(S): 100 TABLET, FILM COATED ORAL at 06:54

## 2023-01-01 RX ADMIN — Medication 3 MILLILITER(S): at 20:41

## 2023-01-01 RX ADMIN — Medication 3 MILLILITER(S): at 09:25

## 2023-01-01 RX ADMIN — VALACYCLOVIR 1000 MILLIGRAM(S): 500 TABLET, FILM COATED ORAL at 05:59

## 2023-01-01 RX ADMIN — Medication 3 MILLILITER(S): at 03:22

## 2023-01-01 RX ADMIN — VALACYCLOVIR 1000 MILLIGRAM(S): 500 TABLET, FILM COATED ORAL at 22:55

## 2023-01-01 RX ADMIN — BUDESONIDE AND FORMOTEROL FUMARATE DIHYDRATE 2 PUFF(S): 160; 4.5 AEROSOL RESPIRATORY (INHALATION) at 22:07

## 2023-01-01 RX ADMIN — Medication 3 MILLILITER(S): at 09:31

## 2023-01-01 RX ADMIN — MUPIROCIN 1 APPLICATION(S): 20 OINTMENT TOPICAL at 17:02

## 2023-01-01 RX ADMIN — SODIUM CHLORIDE 4 MILLILITER(S): 9 INJECTION INTRAMUSCULAR; INTRAVENOUS; SUBCUTANEOUS at 08:10

## 2023-01-01 RX ADMIN — SODIUM CHLORIDE 70 MILLILITER(S): 9 INJECTION, SOLUTION INTRAVENOUS at 18:46

## 2023-01-01 RX ADMIN — Medication 300 MILLIGRAM(S): at 12:18

## 2023-01-01 RX ADMIN — Medication 300 MILLIGRAM(S): at 14:41

## 2023-01-01 RX ADMIN — Medication 1 DROP(S): at 06:36

## 2023-01-01 RX ADMIN — VALACYCLOVIR 1000 MILLIGRAM(S): 500 TABLET, FILM COATED ORAL at 14:39

## 2023-01-01 RX ADMIN — DONEPEZIL HYDROCHLORIDE 5 MILLIGRAM(S): 10 TABLET, FILM COATED ORAL at 23:05

## 2023-01-01 RX ADMIN — ENOXAPARIN SODIUM 40 MILLIGRAM(S): 100 INJECTION SUBCUTANEOUS at 11:37

## 2023-01-01 RX ADMIN — PANTOPRAZOLE SODIUM 40 MILLIGRAM(S): 20 TABLET, DELAYED RELEASE ORAL at 06:14

## 2023-01-01 RX ADMIN — Medication 1 DROP(S): at 05:51

## 2023-01-01 RX ADMIN — SODIUM CHLORIDE 2400 MILLILITER(S): 9 INJECTION INTRAMUSCULAR; INTRAVENOUS; SUBCUTANEOUS at 01:28

## 2023-01-01 RX ADMIN — Medication 200 MILLIGRAM(S): at 05:59

## 2023-01-01 RX ADMIN — SODIUM CHLORIDE 4 MILLILITER(S): 9 INJECTION INTRAMUSCULAR; INTRAVENOUS; SUBCUTANEOUS at 15:12

## 2023-01-01 RX ADMIN — Medication 200 MILLIGRAM(S): at 23:31

## 2023-01-01 RX ADMIN — MORPHINE SULFATE 2 MILLIGRAM(S): 50 CAPSULE, EXTENDED RELEASE ORAL at 18:42

## 2023-01-01 RX ADMIN — Medication 200 MILLIGRAM(S): at 06:45

## 2023-01-01 RX ADMIN — CEFTRIAXONE 100 MILLIGRAM(S): 500 INJECTION, POWDER, FOR SOLUTION INTRAMUSCULAR; INTRAVENOUS at 01:28

## 2023-01-01 RX ADMIN — Medication 3 MILLILITER(S): at 15:11

## 2023-01-01 RX ADMIN — Medication 1000 MILLIGRAM(S): at 17:01

## 2023-01-01 RX ADMIN — Medication 3 MILLILITER(S): at 08:09

## 2023-01-01 RX ADMIN — RISPERIDONE 0.5 MILLIGRAM(S): 4 TABLET ORAL at 22:00

## 2023-01-01 RX ADMIN — LOSARTAN POTASSIUM 50 MILLIGRAM(S): 100 TABLET, FILM COATED ORAL at 06:15

## 2023-01-01 RX ADMIN — RISPERIDONE 0.5 MILLIGRAM(S): 4 TABLET ORAL at 17:33

## 2023-01-01 RX ADMIN — Medication 600 MILLIGRAM(S): at 17:25

## 2023-01-01 RX ADMIN — Medication 400 MILLIGRAM(S): at 20:21

## 2023-01-01 RX ADMIN — MORPHINE SULFATE 1 MILLIGRAM(S): 50 CAPSULE, EXTENDED RELEASE ORAL at 15:12

## 2023-01-01 RX ADMIN — VALACYCLOVIR 1000 MILLIGRAM(S): 500 TABLET, FILM COATED ORAL at 21:21

## 2023-01-01 RX ADMIN — SENNA PLUS 2 TABLET(S): 8.6 TABLET ORAL at 22:55

## 2023-01-01 RX ADMIN — CHLORHEXIDINE GLUCONATE 1 APPLICATION(S): 213 SOLUTION TOPICAL at 11:42

## 2023-01-01 RX ADMIN — PANTOPRAZOLE SODIUM 40 MILLIGRAM(S): 20 TABLET, DELAYED RELEASE ORAL at 06:38

## 2023-01-01 RX ADMIN — Medication 1 DROP(S): at 06:54

## 2023-01-01 RX ADMIN — DONEPEZIL HYDROCHLORIDE 5 MILLIGRAM(S): 10 TABLET, FILM COATED ORAL at 22:20

## 2023-01-01 RX ADMIN — Medication 200 MILLIGRAM(S): at 01:15

## 2023-01-01 RX ADMIN — RISPERIDONE 0.5 MILLIGRAM(S): 4 TABLET ORAL at 22:21

## 2023-01-01 RX ADMIN — Medication 1 DROP(S): at 18:46

## 2023-01-01 RX ADMIN — RISPERIDONE 0.5 MILLIGRAM(S): 4 TABLET ORAL at 06:13

## 2023-01-01 RX ADMIN — RISPERIDONE 0.5 MILLIGRAM(S): 4 TABLET ORAL at 18:54

## 2023-01-01 RX ADMIN — Medication 1000 MILLIGRAM(S): at 23:27

## 2023-01-01 RX ADMIN — Medication 1 DROP(S): at 11:18

## 2023-01-01 RX ADMIN — VALACYCLOVIR 1000 MILLIGRAM(S): 500 TABLET, FILM COATED ORAL at 21:58

## 2023-01-01 RX ADMIN — Medication 650 MILLIGRAM(S): at 03:47

## 2023-01-01 RX ADMIN — Medication 3 MILLILITER(S): at 20:30

## 2023-01-01 RX ADMIN — PANTOPRAZOLE SODIUM 40 MILLIGRAM(S): 20 TABLET, DELAYED RELEASE ORAL at 05:50

## 2023-01-01 RX ADMIN — ENOXAPARIN SODIUM 40 MILLIGRAM(S): 100 INJECTION SUBCUTANEOUS at 11:41

## 2023-01-01 RX ADMIN — MORPHINE SULFATE 2 MILLIGRAM(S): 50 CAPSULE, EXTENDED RELEASE ORAL at 18:29

## 2023-01-01 RX ADMIN — Medication 1000 MILLIGRAM(S): at 06:41

## 2023-01-01 RX ADMIN — VALACYCLOVIR 1000 MILLIGRAM(S): 500 TABLET, FILM COATED ORAL at 06:15

## 2023-01-01 RX ADMIN — Medication 300 MILLIGRAM(S): at 13:30

## 2023-01-01 RX ADMIN — Medication 200 MILLIGRAM(S): at 23:40

## 2023-01-01 RX ADMIN — SODIUM CHLORIDE 75 MILLILITER(S): 9 INJECTION, SOLUTION INTRAVENOUS at 14:39

## 2023-01-01 RX ADMIN — Medication 10 MILLILITER(S): at 00:23

## 2023-01-01 RX ADMIN — PANTOPRAZOLE SODIUM 40 MILLIGRAM(S): 20 TABLET, DELAYED RELEASE ORAL at 05:52

## 2023-01-01 RX ADMIN — VALACYCLOVIR 1000 MILLIGRAM(S): 500 TABLET, FILM COATED ORAL at 21:16

## 2023-01-01 RX ADMIN — BUDESONIDE AND FORMOTEROL FUMARATE DIHYDRATE 2 PUFF(S): 160; 4.5 AEROSOL RESPIRATORY (INHALATION) at 10:48

## 2023-01-01 RX ADMIN — SENNA PLUS 2 TABLET(S): 8.6 TABLET ORAL at 22:30

## 2023-01-01 RX ADMIN — SENNA PLUS 2 TABLET(S): 8.6 TABLET ORAL at 21:58

## 2023-01-01 RX ADMIN — DONEPEZIL HYDROCHLORIDE 5 MILLIGRAM(S): 10 TABLET, FILM COATED ORAL at 21:58

## 2023-01-01 RX ADMIN — SODIUM CHLORIDE 2400 MILLILITER(S): 9 INJECTION INTRAMUSCULAR; INTRAVENOUS; SUBCUTANEOUS at 02:28

## 2023-01-01 RX ADMIN — SODIUM CHLORIDE 4 MILLILITER(S): 9 INJECTION INTRAMUSCULAR; INTRAVENOUS; SUBCUTANEOUS at 20:21

## 2023-01-01 RX ADMIN — CEFTRIAXONE 1000 MILLIGRAM(S): 500 INJECTION, POWDER, FOR SOLUTION INTRAMUSCULAR; INTRAVENOUS at 01:58

## 2023-01-01 RX ADMIN — SODIUM CHLORIDE 75 MILLILITER(S): 9 INJECTION, SOLUTION INTRAVENOUS at 05:29

## 2023-01-01 RX ADMIN — Medication 3 MILLILITER(S): at 03:05

## 2023-01-01 RX ADMIN — Medication 10 MILLILITER(S): at 12:34

## 2023-01-01 RX ADMIN — SODIUM CHLORIDE 4 MILLILITER(S): 9 INJECTION INTRAMUSCULAR; INTRAVENOUS; SUBCUTANEOUS at 08:55

## 2023-01-01 RX ADMIN — Medication 1 DROP(S): at 07:15

## 2023-01-01 RX ADMIN — SODIUM CHLORIDE 4 MILLILITER(S): 9 INJECTION INTRAMUSCULAR; INTRAVENOUS; SUBCUTANEOUS at 20:00

## 2023-01-01 RX ADMIN — Medication 3 MILLILITER(S): at 20:13

## 2023-01-01 RX ADMIN — Medication 200 MILLIGRAM(S): at 07:11

## 2023-01-01 RX ADMIN — MORPHINE SULFATE 2 MILLIGRAM(S): 50 CAPSULE, EXTENDED RELEASE ORAL at 13:19

## 2023-01-01 RX ADMIN — SODIUM CHLORIDE 70 MILLILITER(S): 9 INJECTION, SOLUTION INTRAVENOUS at 21:21

## 2023-01-01 RX ADMIN — Medication 3 MILLILITER(S): at 03:29

## 2023-01-01 RX ADMIN — Medication 400 MILLIGRAM(S): at 17:46

## 2023-01-01 RX ADMIN — LOSARTAN POTASSIUM 50 MILLIGRAM(S): 100 TABLET, FILM COATED ORAL at 05:59

## 2023-01-01 RX ADMIN — ENOXAPARIN SODIUM 40 MILLIGRAM(S): 100 INJECTION SUBCUTANEOUS at 12:14

## 2023-01-01 RX ADMIN — DONEPEZIL HYDROCHLORIDE 5 MILLIGRAM(S): 10 TABLET, FILM COATED ORAL at 22:30

## 2023-01-01 RX ADMIN — Medication 200 MILLIGRAM(S): at 13:30

## 2023-01-01 RX ADMIN — Medication 200 MILLIGRAM(S): at 11:47

## 2023-11-10 NOTE — ED ADULT TRIAGE NOTE - CHIEF COMPLAINT QUOTE
Patient presents for difficulty breathing, fever, chills, and shaking. Patient fell this morning, no head trauma/LOC.

## 2023-11-11 NOTE — H&P ADULT - PROBLEM SELECTOR PLAN 2
c/w meds after med rec B/L inflamed conjunctivae - likely viral with secondary bacterial infection   Would place topical antibiotics eyedrops

## 2023-11-11 NOTE — PATIENT PROFILE ADULT - FALL HARM RISK - HARM RISK INTERVENTIONS
Assistance with ambulation/Assistance OOB with selected safe patient handling equipment/Communicate Risk of Fall with Harm to all staff/Discuss with provider need for PT consult/Monitor for mental status changes/Monitor gait and stability/Move patient closer to nurses' station/Reinforce activity limits and safety measures with patient and family/Reorient to person, place and time as needed/Tailored Fall Risk Interventions/Toileting schedule using arm’s reach rule for commode and bathroom/Use of alarms - bed, chair and/or voice tab/Visual Cue: Yellow wristband and red socks/Bed in lowest position, wheels locked, appropriate side rails in place/Call bell, personal items and telephone in reach/Instruct patient to call for assistance before getting out of bed or chair/Non-slip footwear when patient is out of bed/[place] to call system/Physically safe environment - no spills, clutter or unnecessary equipment/Purposeful Proactive Rounding/Room/bathroom lighting operational, light cord in reach Assistance with ambulation/Assistance OOB with selected safe patient handling equipment/Communicate Risk of Fall with Harm to all staff/Discuss with provider need for PT consult/Monitor for mental status changes/Monitor gait and stability/Move patient closer to nurses' station/Reinforce activity limits and safety measures with patient and family/Reorient to person, place and time as needed/Tailored Fall Risk Interventions/Toileting schedule using arm’s reach rule for commode and bathroom/Use of alarms - bed, chair and/or voice tab/Visual Cue: Yellow wristband and red socks/Bed in lowest position, wheels locked, appropriate side rails in place/Call bell, personal items and telephone in reach/Instruct patient to call for assistance before getting out of bed or chair/Non-slip footwear when patient is out of bed/[place] to call system/Physically safe environment - no spills, clutter or unnecessary equipment/Purposeful Proactive Rounding/Room/bathroom lighting operational, light cord in reach/Unable to comprehend

## 2023-11-11 NOTE — PATIENT PROFILE ADULT - LANGUAGE ASSISTANCE NEEDED
Yes-Patient/Caregiver accepts free interpretation services... No-Patient/Caregiver offered and refused free interpretation services. Ketoconazole Counseling:   Patient counseled regarding improving absorption with orange juice.  Adverse effects include but are not limited to breast enlargement, headache, diarrhea, nausea, upset stomach, liver function test abnormalities, taste disturbance, and stomach pain.  There is a rare possibility of liver failure that can occur when taking ketoconazole. The patient understands that monitoring of LFTs may be required, especially at baseline. The patient verbalized understanding of the proper use and possible adverse effects of ketoconazole.  All of the patient's questions and concerns were addressed.

## 2023-11-11 NOTE — CHART NOTE - NSCHARTNOTEFT_GEN_A_CORE
Patient seen and evaluated. Unable to participate with Cantonese  2/2 difficulty hearing and dementia. No family is at the bedside. Patient noted to be febrile to 101.9 in the ED and requiring 3L NC. RVP was checked and positive for coronavirus (not COVID-19). CXR personally reviewed and negative for any infiltrate. Thus, suspect the sepsis is likely viral in nature and 2/2 coronavirus. Continue aggressive suctioning and Robitussin. Continue Polymyxin drops for conjunctivitis. Unfortunately, patient's troponin uptrended so he will need to be placed on telemetry monitoring. Spoke with CATALINA Riggins who will assist in this. Remaining care as noted above and in H/P written earlier in the day.

## 2023-11-11 NOTE — H&P ADULT - PROBLEM SELECTOR PLAN 3
IMPROVE VTE Individual Risk Assessment          RISK                                                          Points  [  ] Previous VTE                                                3  [  ] Thrombophilia                                             2  [  ] Lower limb paralysis                                   2        (unable to hold up >15 seconds)    [  ] Current Cancer                                             2         (within 6 months)  [ x ] Immobilization > 24 hrs                              1  [  ] ICU/CCU stay > 24 hours                             1  [ x ] Age > 60                                                         1    IMPROVE VTE Score:         [    2     ] Non covid coronavirus   Supportive care

## 2023-11-11 NOTE — H&P ADULT - HISTORY OF PRESENT ILLNESS
83 y.o. M with PMHx of HTN, dementia, presenting with generalized weakness and shortness of breath. Family at the bedside providing history, state that for the past 2 days pt has been feeling ill, has a cough, and has increased shortness of breath on exertion. Early yesterday the patient developed cough and congestion. They decided to bring him to the ED today after he was unable to walk on his own when he is usually able to walk with a cane. Family deny any pt complaints of any chest pain, palpitations, fever, chills, headache, visual changes, nausea, vomiting diarrhea. NKDA    In the ED   /79 , T 102.9, RR 22, 95% on ra   CXR- no acute infiltrates on wet read  Trop 1- 108  EKG NSR no acute ischemic changes on wet read   RVP +  UA - neg   ABG wnl

## 2023-11-11 NOTE — ED PROVIDER NOTE - CARE PLAN
Principal Discharge DX:	Sepsis  Secondary Diagnosis:	Weakness  Secondary Diagnosis:	Altered mental status   1

## 2023-11-11 NOTE — H&P ADULT - NSHPPHYSICALEXAM_GEN_ALL_CORE
VITALS:     GENERAL: NAD, elderly, lying in bed comfortably  HEAD:  Atraumatic, Normocephalic  EYES: EOMI, PERRLA, conjunctiva and sclera clear  ENT: Moist mucous membranes  NECK: Supple, No JVD  CHEST/LUNG: Clear to auscultation bilaterally; No rales, rhonchi, wheezing, or rubs. Unlabored respirations  HEART: Regular rate and rhythm; No murmurs, rubs, or gallops  ABDOMEN: Bowel sounds present; Soft, Nontender, Nondistended. No hepatomegaly  EXTREMITIES:  2+ Peripheral Pulses, brisk capillary refill. No clubbing, cyanosis, or edema  NERVOUS SYSTEM:  Alert & Oriented X1, speech clear. No deficits   MSK: FROM all 4 extremities, full and equal strength  SKIN: No rashes or lesions VITALS:   Assessed pt with Cantonese speaking PGY2 Dr. Bob  GENERAL: NAD, elderly, lying in bed comfortably  HEAD:  Atraumatic, Normocephalic  EYES: +b/l cpnjunctivitis with +purulent discharge, EOMI, PERRLA   ENT: Moist mucous membranes  NECK: Supple, No JVD  CHEST/LUNG: +upper resp sounds, course bilaterally; No rales, rhonchi, wheezing, or rubs. Unlabored respirations  HEART: Regular rate and rhythm; No murmurs, rubs, or gallops  ABDOMEN: Bowel sounds present; Soft, Nontender, Nondistended. No hepatomegaly  EXTREMITIES:  2+ Peripheral Pulses, brisk capillary refill. No clubbing, cyanosis, or edema  NERVOUS SYSTEM:  Alert & Oriented X1, speech clear. No deficits   MSK: FROM all 4 extremities, full and equal strength  SKIN: No rashes or lesions

## 2023-11-11 NOTE — ED PROVIDER NOTE - CLINICAL SUMMARY MEDICAL DECISION MAKING FREE TEXT BOX
83-year-old male brought in for altered mental status, weakness and cough.  Patient febrile and tachycardic on ED arrival.  Code sepsis labs ordered.  Possible viral illness versus pneumonia versus UTI as etiology.  Will likely require admission.

## 2023-11-11 NOTE — PATIENT PROFILE ADULT - NSPROPTRIGHTNOTIFYOBTAINDET_GEN_A_NUR
Addended by: SCOTT WANG on: 11/2/2020 01:35 PM     Modules accepted: Level of Service     pt confused and not receptive to

## 2023-11-11 NOTE — H&P ADULT - ASSESSMENT
83 y.o. M with PMHx of HTN, dementia, presenting with generalized weakness and shortness of breathing. Admitted for sepsis 2/2 AHRF.  83 y.o. M with PMHx of HTN, dementia, presenting with generalized weakness and shortness of breath. Admitted for sepsis 2/2 AHRF.  83 y.o. M with PMHx of HTN, dementia, presenting with generalized weakness and shortness of breath. Admitted for sepsis 2/2 AHRF.     Primary team to confirm med rec in the AM with family.

## 2023-11-11 NOTE — ED ADULT NURSE NOTE - NSFALLRISKINTERV_ED_ALL_ED

## 2023-11-11 NOTE — ED PROVIDER NOTE - NS ED ATTENDING STATEMENT MOD
Soft, non-tender, no hepatosplenomegaly, normal bowel sounds Soft, non-tender, no hepatosplenomegaly, normal bowel sounds Soft, non-tender, no hepatosplenomegaly, normal bowel sounds Soft, non-tender, no hepatosplenomegaly, normal bowel sounds Attending Only

## 2023-11-11 NOTE — ED ADULT NURSE NOTE - OBJECTIVE STATEMENT
Patient bought in to the ED by daughter complaining of fall this morning and difficulty breathing, fever, chills, and shaking per daughter. no head trauma/LOC.

## 2023-11-11 NOTE — ED PROVIDER NOTE - PHYSICAL EXAMINATION
General: no acute distress   HEENT: normocephalic, atraumatic   Respiratory: increased work of breathing, lungs clear to auscultation bilaterally   Cardiac: tachycardic    Abdomen: soft, non-tender, no guarding or rebound   Skin: warm, dry   Neuro: responsive to verbal and painful stimuli

## 2023-11-11 NOTE — H&P ADULT - ATTENDING COMMENTS
Vital Signs Last 24 Hrs  T(C): 37.1 (11 Nov 2023 07:40), Max: 39.4 (10 Nov 2023 23:59)  T(F): 98.8 (11 Nov 2023 07:40), Max: 102.9 (10 Nov 2023 23:59)  HR: 77 (11 Nov 2023 07:40) (77 - 104)  BP: 146/76 (11 Nov 2023 07:40) (137/66 - 151/78)  RR: 18 (11 Nov 2023 07:40) (18 - 22)  SpO2: 95% (11 Nov 2023 07:40) (95% - 98%)  Parameters below as of 11 Nov 2023 07:40  Patient On (Oxygen Delivery Method): room air     Labs reviewed  BUN/Cr - 26/1.28  Trop - 108  UA - unremarkable     CXR independent review  unremarkable     Impression   83 year old man with PMH of HTN, dementia here with a worsening generalized weakness, dry cough concerning for a viral URI with and infectious bilateral conjunctivitis.  Upper airway was suctioned at bedside with improvement in breathing and lung examination     A/P   Viral URI  Sepsis  Infectious conjunctivitis  Ambulatory dysfunction   Physical debility       Plan   Admit to medicine   Sepsis work up   Topical antibiotics for both eyes  No indication for systemic antibiotics presently  Fall precaution   Supportive care - antiemetics  PT evaluation and treatment if indicated  Goals of care - with family

## 2023-11-11 NOTE — PATIENT PROFILE ADULT - FUNCTIONAL ASSESSMENT - BASIC MOBILITY 6.
2-calculated by average/Not able to assess (calculate score using [facility] averaging method)  1-calculated by average/Not able to assess (calculate score using [facility] averaging method)

## 2023-11-11 NOTE — ED PROVIDER NOTE - OBJECTIVE STATEMENT
83-year-old male, PMH of HTN, dementia, presenting with weakness and trouble breathing.  History provided by patient's son and daughter-in-law at bedside.  Yesterday the patient developed cough and congestion and today he was unable to walk on his own when he is usually able to walk with a cane.

## 2023-11-11 NOTE — H&P ADULT - PROBLEM SELECTOR PLAN 1
p/w fever, HR >90, hypotension, lactate 1.9  RVP +  Had +upper airway sounds, improved after nasal suction  received 2.4L ns bolus  Viral URI, supportive care, primary team to reassess    f/u blood cultures, urine cultures  ua negative  tylenol prn

## 2023-11-11 NOTE — H&P ADULT - PROBLEM SELECTOR PLAN 5
IMPROVE VTE Individual Risk Assessment          RISK                                                          Points  [  ] Previous VTE                                                3  [  ] Thrombophilia                                             2  [  ] Lower limb paralysis                                   2        (unable to hold up >15 seconds)    [  ] Current Cancer                                             2         (within 6 months)  [ x ] Immobilization > 24 hrs                              1  [  ] ICU/CCU stay > 24 hours                             1  [ x ] Age > 60                                                         1    IMPROVE VTE Score:         [    2     ]

## 2023-11-12 NOTE — CONSULT NOTE ADULT - SUBJECTIVE AND OBJECTIVE BOX
***TEMPLATE ONLY***      Patient is a 83y old  Male who presents with a chief complaint of Sepsis (12 Nov 2023 08:02)      HPI:  83 y.o. M with PMHx of HTN, dementia, presenting with generalized weakness and shortness of breath. Family at the bedside providing history, state that for the past 2 days pt has been feeling ill, has a cough, and has increased shortness of breath on exertion. Early yesterday the patient developed cough and congestion. They decided to bring him to the ED today after he was unable to walk on his own when he is usually able to walk with a cane. Family deny any pt complaints of any chest pain, palpitations, fever, chills, headache, visual changes, nausea, vomiting diarrhea. NKDA    In the ED   /79 , T 102.9, RR 22, 95% on ra   CXR- no acute infiltrates on wet read  Trop 1- 108  EKG NSR no acute ischemic changes on wet read   RVP +  UA - neg   ABG wnl   (11 Nov 2023 06:14)           The patient was last know well at  The patient lives at home/ NH.  The patient walks without assistance/ with a cane or walker    Neurological Review of Systems:  No difficulty with language.  No vision loss or double vision.  No dizziness, vertigo or new hearing loss.  No difficulty with speech or swallowing.  No focal weakness.  No focal sensory changes.  No numbness or tingling in the bilateral lower extremities.  No difficulty with balance.  No difficulty with ambulation.      MEDICATIONS  (STANDING):  allopurinol 300 milliGRAM(s) Oral daily  budesonide 160 MICROgram(s)/formoterol 4.5 MICROgram(s) Inhaler 2 Puff(s) Inhalation two times a day  dextrose 5% + sodium chloride 0.9%. 1000 milliLiter(s) (70 mL/Hr) IV Continuous <Continuous>  donepezil 5 milliGRAM(s) Oral at bedtime  enoxaparin Injectable 40 milliGRAM(s) SubCutaneous every 24 hours  guaiFENesin  milliGRAM(s) Oral every 12 hours  lactated ringers. 1000 milliLiter(s) (75 mL/Hr) IV Continuous <Continuous>  losartan 50 milliGRAM(s) Oral daily  pantoprazole    Tablet 40 milliGRAM(s) Oral before breakfast  risperiDONE   Tablet 0.5 milliGRAM(s) Oral at bedtime  senna 2 Tablet(s) Oral at bedtime  trimethoprim/polymyxin Solution 1 Drop(s) Both EYES two times a day    MEDICATIONS  (PRN):  acetaminophen     Tablet .. 650 milliGRAM(s) Oral every 6 hours PRN Temp greater or equal to 38C (100.4F), Mild Pain (1 - 3)  albuterol    90 MICROgram(s) HFA Inhaler 2 Puff(s) Inhalation every 6 hours PRN Shortness of Breath and/or Wheezing    Allergies    No Known Allergies    Intolerances      PAST MEDICAL & SURGICAL HISTORY:  HTN (hypertension)      Dementia        FAMILY HISTORY:    SOCIAL HISTORY: non smoker/ former smoker/ active smoker    Review of Systems:  Constitutional: No generalized weakness. No fevers or chills.                    Eyes, Ears, Mouth, Throat: No vision loss   Respiratory: No shortness of breath or cough.                                Cardiovascular: No chest pain or palpitations  Gastrointestinal: No nausea or vomiting.                                         Genitourinary: No urinary incontinence or burning on urination.  Musculoskeletal: No joint pain.                                                           Dermatologic: No rash.  Neurological: as per HPI                                                                      Psychiatric: No behavioral problems.  Endocrine: No known hypoglycemia.               Hematologic/Lymphatic: No easy bleeding.    O:  Vital Signs Last 24 Hrs  T(C): 38.2 (12 Nov 2023 17:38), Max: 38.2 (12 Nov 2023 17:38)  T(F): 100.8 (12 Nov 2023 17:38), Max: 100.8 (12 Nov 2023 17:38)  HR: 88 (12 Nov 2023 17:38) (62 - 88)  BP: 123/67 (12 Nov 2023 17:38) (101/54 - 146/69)  BP(mean): 106 (11 Nov 2023 21:50) (106 - 106)  RR: 19 (12 Nov 2023 17:38) (18 - 20)  SpO2: 94% (12 Nov 2023 17:38) (92% - 99%)    Parameters below as of 12 Nov 2023 17:38  Patient On (Oxygen Delivery Method): room air        General Exam:   General appearance: No acute distress                 Cardiovascular: Pedal dorsalis pulses intact bilaterally    Neurological Exam:  NIH Stroke Scale (NIHSS):   1a. LOConscious:  0-alert 1-lethargy 2-obtund 3-coma:    _____  1b. LOC Questions:  0-both 1-one 2-none                       _____  1c. LOC Commands:  0-both 1-one 2-none                     _____  2.   Gaze:  0-nl 1-partial 2-conjugate                                _____  3.   Visual:  0-nl 1-part raj 2-full raj 3-bilat raj         _____  4.   Facial Palsy:  0-nl 1-minor 2-part 3-complete             _____  5.   Motor Arm:  0-nl 1-drift 2-effort 3-no effort         Left             _____                              4-no move UN-amputated                     Right  _____  6.   Motor Leg:                                                                 Left   _____                                                                                   Right _____  7.   Ataxia:  0-nl 1-one limb 2-two UN-amp                      _____  8.   Sensory:  0-nl 1-mild 2-severe                                  _____  9.   Language:  0-nl 1-mild 2-severe 3-mute                     _____  10.  Dysarthria:  0-nl 1-mild 2-severe 3-barrier                  _____  11.  Extinction/Inattention:  0-nl 1-mild 2-deep                 _____         TOTAL NIHSS       ________    MRS Score:  _____  (MRS Scoring.  No symptoms at all: 0;   No significant disability despite symptoms; able to carry out all usual duties and activities: +1;  Slight disability; unable to carry out all previous activities, but able to look after own affairs without assistance: +2;  Moderate disability; requiring some help, but able to walk without assistance: +3;  Moderately severe disability; unable to walk and attend to bodily needs without assistance: +4;   Severe disability; bedridden, incontinent and requiring constant nursing care and attention:  +5;  Dead: +6)    Mental Status: Orientated to self, date and place.  Attention intact.  No dysarthria, aphasia or neglect.  Knowledge intact.  Registration intact.  Short and long term memory grossly intact.      Cranial Nerves: CN I - not tested.  PERRL, EOMI, VFF, no nystagmus or diplopia.  No APD.  Fundi not visualized bilaterally.  CN V1-3 intact to light touch.  No facial asymmetry.  Hearing intact to finger rub bilaterally.  Tongue, uvula and palate midline.  Sternocleidomastoid and Trapezius intact bilaterally.    Motor:   Tone: normal.                  Strength intact throughout  No pronator drift bilaterally                      No dysmetria on finger-nose-finger or heel-shin-heel  No truncal ataxia.  No resting, postural or action tremor.  No myoclonus.    Sensation: intact to light touch    Deep Tendon Reflexes: 1+ bilateral biceps, triceps, brachioradialis, knee and ankle  Toes flexor bilaterally    Gait: normal and stable.  Rhomberg -luan.    Other:      LABS:                        13.0   6.33  )-----------( 179      ( 12 Nov 2023 08:48 )             39.0     11-12    138  |  107  |  18  ----------------------------<  113<H>  3.7   |  24  |  0.96    Ca    8.3<L>      12 Nov 2023 08:48  Phos  2.9     11-12  Mg     2.3     11-12    TPro  8.7<H>  /  Alb  3.6  /  TBili  0.9  /  DBili  x   /  AST  39  /  ALT  24  /  AlkPhos  85  11-11    PT/INR - ( 11 Nov 2023 01:00 )   PT: 11.5 sec;   INR: 1.01 ratio         PTT - ( 11 Nov 2023 01:00 )  PTT:37.7 sec  Urinalysis Basic - ( 12 Nov 2023 08:48 )    Color: x / Appearance: x / SG: x / pH: x  Gluc: 113 mg/dL / Ketone: x  / Bili: x / Urobili: x   Blood: x / Protein: x / Nitrite: x   Leuk Esterase: x / RBC: x / WBC x   Sq Epi: x / Non Sq Epi: x / Bacteria: x      LDL  HbA1C    RADIOLOGY & ADDITIONAL STUDIES:    EKG: < from: 12 Lead ECG (11.11.23 @ 00:28) >  Ventricular Rate 99 BPM    Atrial Rate 99 BPM    P-R Interval 138 ms    QRS Duration 90 ms    Q-T Interval 328 ms    QTC Calculation(Bazett) 420 ms    P Axis 72 degrees    R Axis 59 degrees    T Axis 53 degrees    Diagnosis Line *** Poor data quality, interpretation may be adversely affected  Normal sinus rhythm  Possible Left atrial enlargement  Borderline ECG    Confirmed by VICTOR M HAMILTON, CHRISTY ([id number]) on 11/12/2023 9:48:46 AM    < end of copied text >  < from: CT Head No Cont (11.12.23 @ 16:41) >  (images reviewed)  ACC: [id number] EXAM:  CT BRAIN   ORDERED BY: RACHEL ANDRE     PROCEDURE DATE:  11/12/2023          INTERPRETATION:  CLINICAL INFORMATION: Right eyelid droop.    COMPARISON STUDY: None    TECHNIQUE:  Noncontrast axial CT images were acquired through the head.  Sagittal and coronal reformats were performed.    FINDINGS:  The examination was performed twice due to head motion on the initial   scan.    There is no CT evidence of acute intracranial hemorrhage, mass effect or   midline shift.  There is no acute loss of gray matter-white matter   differentiation.    There is mild generalized cerebral volume loss and mild periventricular   white matter hypoattenuation compatible with chronic microvascular   ischemic disease.  Incidentally noted is a cavum septum pellucidum et   vergae, a normal variant.    There is an approximately 1.7 cm mucous retention cyst versus polyp in   the right sphenoid sinus (3:16).  The remainder of the paranasal sinuses   are clear.    The mastoids are clear bilaterally.    The calvarium, skull base and orbits appear unremarkable.    IMPRESSION:  No CT evidence of acute intracranial pathology.    --- End of Report ---            ANGEL COHEN MD; Attending Radiologist  This document has been electronically signed. Nov 12 2023  5:41PM    < end of copied text >      Impression:       Recommendations:  1.             Admit to telemetry for 24hours to evaluate for arrythmias/ Afib.  2.             MRI brain, MRA head without contrast, Carotid duplex (CD).  If unable to get MR imaging, please consider CTA head and neck in 24hours (no need for CD in this case).  If the patient is unable to get MR and unable to get IV contrast please repeat the CTH in 24hours and get a CD.  Role of the imaging is to evaluate for stroke and evaluate vasculature for artherosclerosis/ thrombi which may have lead to a stroke.  3.             TTE  4.             Please check HbA1C and fasting lipid profile  5.             Secondary stroke prevention: ASA 81mg (or ASA 325mg rectally if unable to take p) and Lipitor 40mg HS; Plavix x 3 weeks.  If the patient is on anticoagulation, there is no neurological need for ASA or Plavix.  6.             BP goal of normal/ permissive HTN of SBP <200/<180<160  7.             NS at 125 cc/h/ D5 NS at 125 cc/hour, if NPO, if cardiac function allows, to ensure good perfusion  8.             Frequent neurochecks  9.             Urine Tox  10.          Able to resume normal diet as passed bedside swallowing test/ NPO for now  11.          Formal speech and swallow evaluation  12.          PT evaluation  13.          STAT CTH IF the patient has sudden change in mental status or neurological exam  14.          DVT PPx    Thank you for the courtesy of this consult.       Patient is a 83y old  Male who presents with a chief complaint of Sepsis (12 Nov 2023 08:02)      HPI:  83 y.o. M with PMHx of HTN, dementia, presenting with generalized weakness and shortness of breath, neurology called for right facial weakness. Family at the bedside providing history, state that for the past 2 days pt has been feeling ill, has a cough, and has increased shortness of breath on exertion. Early yesterday the patient developed cough and congestion. They decided to bring him to the ED today after he was unable to walk on his own when he is usually able to walk with a cane. Family deny any pt complaints of any chest pain, palpitations, fever, chills, headache, visual changes, nausea, vomiting diarrhea. NKDA    In the ED   /79 , T 102.9, RR 22, 95% on ra   CXR- no acute infiltrates on wet read  Trop 1- 108  EKG NSR no acute ischemic changes on wet read   RVP +  UA - neg   ABG wnl   (11 Nov 2023 06:14)    Neurological Review of Systems:  No vision loss or double vision.  No dizziness, vertigo or new hearing loss.  No difficulty with speech or swallowing. No focal sensory changes.     MEDICATIONS  (STANDING):  allopurinol 300 milliGRAM(s) Oral daily  budesonide 160 MICROgram(s)/formoterol 4.5 MICROgram(s) Inhaler 2 Puff(s) Inhalation two times a day  dextrose 5% + sodium chloride 0.9%. 1000 milliLiter(s) (70 mL/Hr) IV Continuous <Continuous>  donepezil 5 milliGRAM(s) Oral at bedtime  enoxaparin Injectable 40 milliGRAM(s) SubCutaneous every 24 hours  guaiFENesin  milliGRAM(s) Oral every 12 hours  lactated ringers. 1000 milliLiter(s) (75 mL/Hr) IV Continuous <Continuous>  losartan 50 milliGRAM(s) Oral daily  pantoprazole    Tablet 40 milliGRAM(s) Oral before breakfast  risperiDONE   Tablet 0.5 milliGRAM(s) Oral at bedtime  senna 2 Tablet(s) Oral at bedtime  trimethoprim/polymyxin Solution 1 Drop(s) Both EYES two times a day    MEDICATIONS  (PRN):  acetaminophen     Tablet .. 650 milliGRAM(s) Oral every 6 hours PRN Temp greater or equal to 38C (100.4F), Mild Pain (1 - 3)  albuterol    90 MICROgram(s) HFA Inhaler 2 Puff(s) Inhalation every 6 hours PRN Shortness of Breath and/or Wheezing    Allergies    No Known Allergies    Intolerances      PAST MEDICAL & SURGICAL HISTORY:  HTN (hypertension)      Dementia        FAMILY HISTORY: nc parents    SOCIAL HISTORY: non smoker    Review of Systems:  Constitutional: + fevers                 Eyes, Ears, Mouth, Throat: No vision loss   Respiratory: + cough.                                Cardiovascular: No no chest pain  Gastrointestinal: No vomiting.                                         Genitourinary: No burning on urination.  Musculoskeletal: No joint pain.                                                           Dermatologic: No rash.  Neurological: as per HPI                                                                      Psychiatric: No behavioral problems.  Endocrine: No known hypoglycemia.               Hematologic/Lymphatic: No easy bleeding.    O:  Vital Signs Last 24 Hrs  T(C): 38.2 (12 Nov 2023 17:38), Max: 38.2 (12 Nov 2023 17:38)  T(F): 100.8 (12 Nov 2023 17:38), Max: 100.8 (12 Nov 2023 17:38)  HR: 88 (12 Nov 2023 17:38) (62 - 88)  BP: 123/67 (12 Nov 2023 17:38) (101/54 - 146/69)  BP(mean): 106 (11 Nov 2023 21:50) (106 - 106)  RR: 19 (12 Nov 2023 17:38) (18 - 20)  SpO2: 94% (12 Nov 2023 17:38) (92% - 99%)    Parameters below as of 12 Nov 2023 17:38  Patient On (Oxygen Delivery Method): room air        General Exam:   General appearance: No acute distress                 Cardiovascular: Pedal dorsalis pulses intact bilaterally    Neurological Exam:  NIH Stroke Scale (NIHSS):   1a. LOConscious:  0-alert 1-lethargy 2-obtund 3-coma:    ___2__  1b. LOC Questions:  0-both 1-one 2-none                       __2___  1c. LOC Commands:  0-both 1-one 2-none                     ____2_  2.   Gaze:  0-nl 1-partial 2-conjugate                                __0___  3.   Visual:  0-nl 1-part raj 2-full raj 3-bilat raj         ___0__  4.   Facial Palsy:  0-nl 1-minor 2-part 3-complete             _2____  5.   Motor Arm:  0-nl 1-drift 2-effort 3-no effort         Left             _3____                              4-no move UN-amputated                     Right  __3___  6.   Motor Leg:                                                                 Left   __3__                                                                                   Right _3____  7.   Ataxia:  0-nl 1-one limb 2-two UN-amp                      __un___  8.   Sensory:  0-nl 1-mild 2-severe                                  __0___  9.   Language:  0-nl 1-mild 2-severe 3-mute                     _2____  10.  Dysarthria:  0-nl 1-mild 2-severe 3-barrier                 2 _____  11.  Extinction/Inattention:  0-nl 1-mild 2-deep                 _0____         TOTAL NIHSS       ____24____    MRS Score:  __4___  (MRS Scoring.  No symptoms at all: 0;   No significant disability despite symptoms; able to carry out all usual duties and activities: +1;  Slight disability; unable to carry out all previous activities, but able to look after own affairs without assistance: +2;  Moderate disability; requiring some help, but able to walk without assistance: +3;  Moderately severe disability; unable to walk and attend to bodily needs without assistance: +4;   Severe disability; bedridden, incontinent and requiring constant nursing care and attention:  +5;  Dead: +6)    Mental Status: Not Orientated to self, date and place.  Obtunded.  Unable to properly eval dysarthria, aphasia or neglect, Knowledge, Registration and memory.     Cranial Nerves: CN I - not tested.  PERRL, blinks to treat bl temporal areas.  CN V1-3 intact to light touch.  right facial weakness, with bl eyelid weakness worse on the right side.  Hearing intact to finger rub bilaterally.  Tongue, uvula and palate midline.  Sternocleidomastoid and Trapezius intact bilaterally.    Motor:   Tone: normal.                  Strength: decreased throughout  Patient does not corporate with dysmetria on finger-nose-finger or heel-shin-heel  No truncal ataxia.  No resting, postural or action tremor.  No myoclonus.    Sensation: intact to light touch    Deep Tendon Reflexes: 1+ bilateral biceps, triceps, brachioradialis, knee and ankle  Toes flexor bilaterally    Gait: normal and stable.  Rhomberg -luan.    Other:      LABS:                        13.0   6.33  )-----------( 179      ( 12 Nov 2023 08:48 )             39.0     11-12    138  |  107  |  18  ----------------------------<  113<H>  3.7   |  24  |  0.96    Ca    8.3<L>      12 Nov 2023 08:48  Phos  2.9     11-12  Mg     2.3     11-12    TPro  8.7<H>  /  Alb  3.6  /  TBili  0.9  /  DBili  x   /  AST  39  /  ALT  24  /  AlkPhos  85  11-11    PT/INR - ( 11 Nov 2023 01:00 )   PT: 11.5 sec;   INR: 1.01 ratio         PTT - ( 11 Nov 2023 01:00 )  PTT:37.7 sec  Urinalysis Basic - ( 12 Nov 2023 08:48 )    Color: x / Appearance: x / SG: x / pH: x  Gluc: 113 mg/dL / Ketone: x  / Bili: x / Urobili: x   Blood: x / Protein: x / Nitrite: x   Leuk Esterase: x / RBC: x / WBC x   Sq Epi: x / Non Sq Epi: x / Bacteria: x      LDL  HbA1C    RADIOLOGY & ADDITIONAL STUDIES:    EKG: < from: 12 Lead ECG (11.11.23 @ 00:28) >  Ventricular Rate 99 BPM    Atrial Rate 99 BPM    P-R Interval 138 ms    QRS Duration 90 ms    Q-T Interval 328 ms    QTC Calculation(Bazett) 420 ms    P Axis 72 degrees    R Axis 59 degrees    T Axis 53 degrees    Diagnosis Line *** Poor data quality, interpretation may be adversely affected  Normal sinus rhythm  Possible Left atrial enlargement  Borderline ECG    Confirmed by VICTOR M HAMILTON, [facility] ([id number]) on 11/12/2023 9:48:46 AM    < end of copied text >  < from: CT Head No Cont (11.12.23 @ 16:41) >  (images reviewed)  ACC: [id number] EXAM:  CT BRAIN   ORDERED BY: RACHEL ANDRE     PROCEDURE DATE:  11/12/2023          INTERPRETATION:  CLINICAL INFORMATION: Right eyelid droop.    COMPARISON STUDY: None    TECHNIQUE:  Noncontrast axial CT images were acquired through the head.  Sagittal and coronal reformats were performed.    FINDINGS:  The examination was performed twice due to head motion on the initial   scan.    There is no CT evidence of acute intracranial hemorrhage, mass effect or   midline shift.  There is no acute loss of gray matter-white matter   differentiation.    There is mild generalized cerebral volume loss and mild periventricular   white matter hypoattenuation compatible with chronic microvascular   ischemic disease.  Incidentally noted is a cavum septum pellucidum et   vergae, a normal variant.    There is an approximately 1.7 cm mucous retention cyst versus polyp in   the right sphenoid sinus (3:16).  The remainder of the paranasal sinuses   are clear.    The mastoids are clear bilaterally.    The calvarium, skull base and orbits appear unremarkable.    IMPRESSION:  No CT evidence of acute intracranial pathology.    --- End of Report ---            ANGEL COHEN MD; Attending Radiologist  This document has been electronically signed. Nov 12 2023  5:41PM    < end of copied text >

## 2023-11-12 NOTE — PHYSICAL THERAPY INITIAL EVALUATION ADULT - PERTINENT HX OF CURRENT PROBLEM, REHAB EVAL
83 y.o. M with PMHx of HTN, dementia, presenting with generalized weakness and shortness of breath. Admitted for sepsis 2/2 AHRF. Sepsis.  Additional past medical history as detailed below by medical team.

## 2023-11-12 NOTE — PHYSICAL THERAPY INITIAL EVALUATION ADULT - LEVEL OF INDEPENDENCE: GAIT, REHAB EVAL
attempted but unable secondary to cognition, gross weakness and retropulsion in standing. Will advance as tolerated

## 2023-11-12 NOTE — PROGRESS NOTE ADULT - PROBLEM SELECTOR PROBLEM 1
Sepsis Sepsis with acute hypoxic respiratory failure Facial droop due to acute cerebrovascular accident (CVA)

## 2023-11-12 NOTE — PHYSICAL THERAPY INITIAL EVALUATION ADULT - MANUAL MUSCLE TESTING RESULTS, REHAB EVAL
Limited assessment secondary to impaired ability to follow commands: BUE 2+/5 shoulders, at least 3-/5 elbow and distal. LEs at least 2-/5 hips and kness and 2+/5 ankles

## 2023-11-12 NOTE — PHYSICAL THERAPY INITIAL EVALUATION ADULT - DIAGNOSIS, PT EVAL
(ICF Model) Pt. present w/deficits in Body Structures/Function (Impairments), incl: Strength, Balance, ROM, cognition, leading to deficits in performing the below noted Activities (Limitations). details…

## 2023-11-12 NOTE — PROGRESS NOTE ADULT - ASSESSMENT
83 y.o. M with PMHx of HTN, dementia, presenting with generalized weakness and shortness of breath. Admitted for sepsis 2/2 AHRF.     Primary team to confirm med rec in the AM with family.  83 y.o. M with PMHx of HTN, dementia, presenting with generalized weakness and shortness of breath. Admitted for sepsis 2/2 AHRF.

## 2023-11-12 NOTE — PHYSICAL THERAPY INITIAL EVALUATION ADULT - ACTIVE RANGE OF MOTION EXAMINATION, REHAB EVAL
Limited assessment secondary to impaired ability to follow commands: BUE elbows and distal at least 3/4 range, shoulders ~1/4 range, LEs at least 1/6 range except ankles at least 1/4 range

## 2023-11-12 NOTE — PROGRESS NOTE ADULT - PROBLEM SELECTOR PLAN 3
Non covid coronavirus   Supportive care B/L inflamed conjunctivae - likely viral with secondary bacterial infection   Would place topical antibiotics eyedrops RVP + coronavirus (not COVID 19)  Chest Xray; no consolidation  received 2.4L ns bolus  f/u blood cultures, urine cultures  ua negative  po guaifenesin 600 q12h  tylenol prn  Supportive care

## 2023-11-12 NOTE — PROGRESS NOTE ADULT - SUBJECTIVE AND OBJECTIVE BOX
PGY-1 Progress Note discussed with attending    PAGER #: [[phone]] TILL 5:00 PM  PLEASE CONTACT ON CALL TEAM:  - On Call Team (Please refer to Darion) FROM 5:00 PM - 8:30PM  - Nightfloat Team FROM 8:30 -7:30 AM    CHIEF COMPLAINT & BRIEF HOSPITAL COURSE:    INTERVAL HPI/OVERNIGHT EVENTS:   MEDICATIONS  (STANDING):  allopurinol 300 milliGRAM(s) Oral daily  donepezil 5 milliGRAM(s) Oral at bedtime  enoxaparin Injectable 40 milliGRAM(s) SubCutaneous every 24 hours  guaifenesin/dextromethorphan Oral Liquid 10 milliLiter(s) Oral every 6 hours  lactated ringers. 1000 milliLiter(s) (75 mL/Hr) IV Continuous <Continuous>  losartan 50 milliGRAM(s) Oral daily  pantoprazole    Tablet 40 milliGRAM(s) Oral before breakfast  risperiDONE   Tablet 0.5 milliGRAM(s) Oral at bedtime  senna 2 Tablet(s) Oral at bedtime  trimethoprim/polymyxin Solution 1 Drop(s) Both EYES two times a day    MEDICATIONS  (PRN):  acetaminophen     Tablet .. 650 milliGRAM(s) Oral every 6 hours PRN Temp greater or equal to 38C (100.4F), Mild Pain (1 - 3)      REVIEW OF SYSTEMS:  CONSTITUTIONAL: No fever, weight loss, or fatigue  RESPIRATORY: No shortness of breath  CARDIOVASCULAR: No chest pain  GASTROINTESTINAL: No abdominal pain.  GENITOURINARY: No dysuria  NEUROLOGICAL: No headaches  SKIN: No itching, burning, rashes    Vital Signs Last 24 Hrs  T(C): 36.7 (12 Nov 2023 05:24), Max: 37 (11 Nov 2023 21:50)  T(F): 98.1 (12 Nov 2023 05:24), Max: 98.6 (11 Nov 2023 21:50)  HR: 76 (12 Nov 2023 05:24) (75 - 88)  BP: 138/80 (12 Nov 2023 05:24) (136/58 - 169/76)  BP(mean): 106 (11 Nov 2023 21:50) (106 - 106)  RR: 18 (12 Nov 2023 05:24) (18 - 19)  SpO2: 99% (12 Nov 2023 05:24) (96% - 99%)    Parameters below as of 12 Nov 2023 05:24  Patient On (Oxygen Delivery Method): nasal cannula  O2 Flow (L/min): 3      PHYSICAL EXAMINATION:  GENERAL: NAD, well built  HEAD:  Atraumatic, Normocephalic  EYES:  conjunctiva and sclera clear  CHEST/LUNG: Clear to auscultation. No rales, rhonchi, wheezing, or rubs  HEART: Regular rate and rhythm; No murmurs, rubs, or gallops  ABDOMEN: Soft, Nontender, Nondistended; Bowel sounds present  NERVOUS SYSTEM:  Alert & Oriented X3,    EXTREMITIES:  2+ Peripheral Pulses, No clubbing, cyanosis, or edema  SKIN: warm dry                          13.0   7.27  )-----------( 167      ( 11 Nov 2023 09:52 )             39.5     11-11    139  |  110<H>  |  20<H>  ----------------------------<  110<H>  4.0   |  22  |  0.98    Ca    8.7      11 Nov 2023 09:52  Phos  2.6     11-11  Mg     2.0     11-11    TPro  8.7<H>  /  Alb  3.6  /  TBili  0.9  /  DBili  x   /  AST  39  /  ALT  24  /  AlkPhos  85  11-11    LIVER FUNCTIONS - ( 11 Nov 2023 01:00 )  Alb: 3.6 g/dL / Pro: 8.7 g/dL / ALK PHOS: 85 U/L / ALT: 24 U/L DA / AST: 39 U/L / GGT: x               PT/INR - ( 11 Nov 2023 01:00 )   PT: 11.5 sec;   INR: 1.01 ratio         PTT - ( 11 Nov 2023 01:00 )  PTT:37.7 sec    CAPILLARY BLOOD GLUCOSE      RADIOLOGY & ADDITIONAL TESTS:                   PGY-1 Progress Note discussed with attending    PAGER #: [[phone]] TILL 5:00 PM  PLEASE CONTACT ON CALL TEAM:  - On Call Team (Please refer to Darion) FROM 5:00 PM - 8:30PM  - Nightfloat Team FROM 8:30 -7:30 AM      INTERVAL HPI/OVERNIGHT EVENTS: Patient is Cantonese speaking  ( Once : [id number]). Tried to speak wit patient with  and to ask if he is okay getting suction done for his upper respiratory tract secretions. Patient denied on multiple attempts. Patient was seen again with family at bedside, was able to suction. Oral secretions were reduced but not completely cleared as patient was bitting down and closing mouth.       MEDICATIONS  (STANDING):  allopurinol 300 milliGRAM(s) Oral daily  donepezil 5 milliGRAM(s) Oral at bedtime  enoxaparin Injectable 40 milliGRAM(s) SubCutaneous every 24 hours  guaifenesin/dextromethorphan Oral Liquid 10 milliLiter(s) Oral every 6 hours  lactated ringers. 1000 milliLiter(s) (75 mL/Hr) IV Continuous <Continuous>  losartan 50 milliGRAM(s) Oral daily  pantoprazole    Tablet 40 milliGRAM(s) Oral before breakfast  risperiDONE   Tablet 0.5 milliGRAM(s) Oral at bedtime  senna 2 Tablet(s) Oral at bedtime  trimethoprim/polymyxin Solution 1 Drop(s) Both EYES two times a day    MEDICATIONS  (PRN):  acetaminophen     Tablet .. 650 milliGRAM(s) Oral every 6 hours PRN Temp greater or equal to 38C (100.4F), Mild Pain (1 - 3)      REVIEW OF SYSTEMS: unable to get ROS    Vital Signs Last 24 Hrs  T(C): 36.7 (12 Nov 2023 05:24), Max: 37 (11 Nov 2023 21:50)  T(F): 98.1 (12 Nov 2023 05:24), Max: 98.6 (11 Nov 2023 21:50)  HR: 76 (12 Nov 2023 05:24) (75 - 88)  BP: 138/80 (12 Nov 2023 05:24) (136/58 - 169/76)  BP(mean): 106 (11 Nov 2023 21:50) (106 - 106)  RR: 18 (12 Nov 2023 05:24) (18 - 19)  SpO2: 99% (12 Nov 2023 05:24) (96% - 99%)    Parameters below as of 12 Nov 2023 05:24  Patient On (Oxygen Delivery Method): nasal cannula  O2 Flow (L/min): 3      PHYSICAL EXAMINATION:  GENERAL: NAD, well built  HEAD:  Atraumatic, Normocephalic  EYES:  conjunctiva and sclera clear  CHEST/LUNG:  Upper tract: oral secretions +. on auscultation coarse crackles present in right lower lobe. chest expansion equal on both sides.   HEART: Regular rate and rhythm; No murmurs, rubs, or gallops  ABDOMEN: Soft, Nontender, Nondistended; Bowel sounds present  NERVOUS SYSTEM:  Alert & Oriented x1-2  EXTREMITIES:  2+ Peripheral Pulses, No clubbing, cyanosis, or edema  SKIN: warm dry                          13.0   7.27  )-----------( 167      ( 11 Nov 2023 09:52 )             39.5     11-11    139  |  110<H>  |  20<H>  ----------------------------<  110<H>  4.0   |  22  |  0.98    Ca    8.7      11 Nov 2023 09:52  Phos  2.6     11-11  Mg     2.0     11-11    TPro  8.7<H>  /  Alb  3.6  /  TBili  0.9  /  DBili  x   /  AST  39  /  ALT  24  /  AlkPhos  85  11-11    LIVER FUNCTIONS - ( 11 Nov 2023 01:00 )  Alb: 3.6 g/dL / Pro: 8.7 g/dL / ALK PHOS: 85 U/L / ALT: 24 U/L DA / AST: 39 U/L / GGT: x               PT/INR - ( 11 Nov 2023 01:00 )   PT: 11.5 sec;   INR: 1.01 ratio         PTT - ( 11 Nov 2023 01:00 )  PTT:37.7 sec    CAPILLARY BLOOD GLUCOSE      RADIOLOGY & ADDITIONAL TESTS:                   PGY-1 Progress Note discussed with attending    PAGER #: [[phone]] TILL 5:00 PM  PLEASE CONTACT ON CALL TEAM:  - On Call Team (Please refer to Darion) FROM 5:00 PM - 8:30PM  - Nightfloat Team FROM 8:30 -7:30 AM      INTERVAL HPI/OVERNIGHT EVENTS: Patient is Cantonese speaking  ( Once : [id number]). Tried to speak wit patient with  and to ask if he is okay getting suction done for his upper respiratory tract secretions. Patient denied on multiple attempts. Patient was seen again with family at bedside, was able to suction. Oral secretions were reduced but not completely cleared as patient was bitting down and closing mouth.   Family at bedside mentions patient coughs when he consumes liquids or solid food.         MEDICATIONS  (STANDING):  allopurinol 300 milliGRAM(s) Oral daily  donepezil 5 milliGRAM(s) Oral at bedtime  enoxaparin Injectable 40 milliGRAM(s) SubCutaneous every 24 hours  guaifenesin/dextromethorphan Oral Liquid 10 milliLiter(s) Oral every 6 hours  lactated ringers. 1000 milliLiter(s) (75 mL/Hr) IV Continuous <Continuous>  losartan 50 milliGRAM(s) Oral daily  pantoprazole    Tablet 40 milliGRAM(s) Oral before breakfast  risperiDONE   Tablet 0.5 milliGRAM(s) Oral at bedtime  senna 2 Tablet(s) Oral at bedtime  trimethoprim/polymyxin Solution 1 Drop(s) Both EYES two times a day    MEDICATIONS  (PRN):  acetaminophen     Tablet .. 650 milliGRAM(s) Oral every 6 hours PRN Temp greater or equal to 38C (100.4F), Mild Pain (1 - 3)      REVIEW OF SYSTEMS: unable to get ROS    Vital Signs Last 24 Hrs  T(C): 36.7 (12 Nov 2023 05:24), Max: 37 (11 Nov 2023 21:50)  T(F): 98.1 (12 Nov 2023 05:24), Max: 98.6 (11 Nov 2023 21:50)  HR: 76 (12 Nov 2023 05:24) (75 - 88)  BP: 138/80 (12 Nov 2023 05:24) (136/58 - 169/76)  BP(mean): 106 (11 Nov 2023 21:50) (106 - 106)  RR: 18 (12 Nov 2023 05:24) (18 - 19)  SpO2: 99% (12 Nov 2023 05:24) (96% - 99%)    Parameters below as of 12 Nov 2023 05:24  Patient On (Oxygen Delivery Method): nasal cannula  O2 Flow (L/min): 3      PHYSICAL EXAMINATION:  GENERAL: NAD, well built  HEAD:  Atraumatic, Normocephalic  EYES:  conjunctiva and sclera clear  CHEST/LUNG:  Upper tract: oral secretions +. on auscultation coarse crackles present in right lower lobe. chest expansion equal on both sides.   HEART: Regular rate and rhythm; No murmurs, rubs, or gallops  ABDOMEN: Soft, Nontender, Nondistended; Bowel sounds present  NERVOUS SYSTEM:  Alert & Oriented x1-2  EXTREMITIES:  2+ Peripheral Pulses, No clubbing, cyanosis, or edema  SKIN: warm dry                          13.0   7.27  )-----------( 167      ( 11 Nov 2023 09:52 )             39.5     11-11    139  |  110<H>  |  20<H>  ----------------------------<  110<H>  4.0   |  22  |  0.98    Ca    8.7      11 Nov 2023 09:52  Phos  2.6     11-11  Mg     2.0     11-11    TPro  8.7<H>  /  Alb  3.6  /  TBili  0.9  /  DBili  x   /  AST  39  /  ALT  24  /  AlkPhos  85  11-11    LIVER FUNCTIONS - ( 11 Nov 2023 01:00 )  Alb: 3.6 g/dL / Pro: 8.7 g/dL / ALK PHOS: 85 U/L / ALT: 24 U/L DA / AST: 39 U/L / GGT: x               PT/INR - ( 11 Nov 2023 01:00 )   PT: 11.5 sec;   INR: 1.01 ratio         PTT - ( 11 Nov 2023 01:00 )  PTT:37.7 sec    CAPILLARY BLOOD GLUCOSE      RADIOLOGY & ADDITIONAL TESTS:                   PGY-1 Progress Note discussed with attending    PAGER #: [[phone]] TILL 5:00 PM  PLEASE CONTACT ON CALL TEAM:  - On Call Team (Please refer to Darion) FROM 5:00 PM - 8:30PM  - Nightfloat Team FROM 8:30 -7:30 AM      INTERVAL HPI/OVERNIGHT EVENTS: Patient is Cantonese speaking  ( Once : [id number]). Tried to speak wit patient with  and to ask if he is okay getting suction done for his upper respiratory tract secretions. Patient denied on multiple attempts. Patient was seen again with family at bedside, was able to suction. Oral secretions were reduced but not completely cleared as patient was bitting down and closing mouth.   Family at bedside mentions patient coughs when he consumes liquids or solid food.   Diet was changed to NPO as patient is coughing.         MEDICATIONS  (STANDING):  allopurinol 300 milliGRAM(s) Oral daily  donepezil 5 milliGRAM(s) Oral at bedtime  enoxaparin Injectable 40 milliGRAM(s) SubCutaneous every 24 hours  guaifenesin/dextromethorphan Oral Liquid 10 milliLiter(s) Oral every 6 hours  lactated ringers. 1000 milliLiter(s) (75 mL/Hr) IV Continuous <Continuous>  losartan 50 milliGRAM(s) Oral daily  pantoprazole    Tablet 40 milliGRAM(s) Oral before breakfast  risperiDONE   Tablet 0.5 milliGRAM(s) Oral at bedtime  senna 2 Tablet(s) Oral at bedtime  trimethoprim/polymyxin Solution 1 Drop(s) Both EYES two times a day    MEDICATIONS  (PRN):  acetaminophen     Tablet .. 650 milliGRAM(s) Oral every 6 hours PRN Temp greater or equal to 38C (100.4F), Mild Pain (1 - 3)      REVIEW OF SYSTEMS: unable to get ROS    Vital Signs Last 24 Hrs  T(C): 36.7 (12 Nov 2023 05:24), Max: 37 (11 Nov 2023 21:50)  T(F): 98.1 (12 Nov 2023 05:24), Max: 98.6 (11 Nov 2023 21:50)  HR: 76 (12 Nov 2023 05:24) (75 - 88)  BP: 138/80 (12 Nov 2023 05:24) (136/58 - 169/76)  BP(mean): 106 (11 Nov 2023 21:50) (106 - 106)  RR: 18 (12 Nov 2023 05:24) (18 - 19)  SpO2: 99% (12 Nov 2023 05:24) (96% - 99%)    Parameters below as of 12 Nov 2023 05:24  Patient On (Oxygen Delivery Method): nasal cannula  O2 Flow (L/min): 3      PHYSICAL EXAMINATION:  GENERAL: NAD, well built  HEAD:  Atraumatic, Normocephalic  EYES:  conjunctiva and sclera clear  CHEST/LUNG:  Upper tract: oral secretions +. on auscultation coarse crackles present in right lower lobe. chest expansion equal on both sides.   HEART: Regular rate and rhythm; No murmurs, rubs, or gallops  ABDOMEN: Soft, Nontender, Nondistended; Bowel sounds present  NERVOUS SYSTEM:  Alert & Oriented x1-2  EXTREMITIES:  2+ Peripheral Pulses, No clubbing, cyanosis, or edema  SKIN: warm dry                          13.0   7.27  )-----------( 167      ( 11 Nov 2023 09:52 )             39.5     11-11    139  |  110<H>  |  20<H>  ----------------------------<  110<H>  4.0   |  22  |  0.98    Ca    8.7      11 Nov 2023 09:52  Phos  2.6     11-11  Mg     2.0     11-11    TPro  8.7<H>  /  Alb  3.6  /  TBili  0.9  /  DBili  x   /  AST  39  /  ALT  24  /  AlkPhos  85  11-11    LIVER FUNCTIONS - ( 11 Nov 2023 01:00 )  Alb: 3.6 g/dL / Pro: 8.7 g/dL / ALK PHOS: 85 U/L / ALT: 24 U/L DA / AST: 39 U/L / GGT: x               PT/INR - ( 11 Nov 2023 01:00 )   PT: 11.5 sec;   INR: 1.01 ratio         PTT - ( 11 Nov 2023 01:00 )  PTT:37.7 sec    CAPILLARY BLOOD GLUCOSE      RADIOLOGY & ADDITIONAL TESTS:

## 2023-11-12 NOTE — PROGRESS NOTE ADULT - PROBLEM SELECTOR PLAN 5
IMPROVE VTE Individual Risk Assessment          RISK                                                          Points  [  ] Previous VTE                                                3  [  ] Thrombophilia                                             2  [  ] Lower limb paralysis                                   2        (unable to hold up >15 seconds)    [  ] Current Cancer                                             2         (within 6 months)  [ x ] Immobilization > 24 hrs                              1  [  ] ICU/CCU stay > 24 hours                             1  [ x ] Age > 60                                                         1    IMPROVE VTE Score:         [    2     ] home med: losartan  c/w home med

## 2023-11-12 NOTE — PHYSICAL THERAPY INITIAL EVALUATION ADULT - IMPAIRMENTS FOUND, PT EVAL
cognitive impairment/ergonomics and body mechanics/gait, locomotion, and balance/muscle strength/poor safety awareness/posture/ROM/ventilation and respiration/gas exchange

## 2023-11-12 NOTE — PROGRESS NOTE ADULT - ATTENDING COMMENTS
Patient was seen and examined at bedside. Savelli  used [id number] ([employer]). patient reports cough but denies any SOB. Denies any pain. Unable to provide full context of his hospital stay  Noted to be coughing with gurgling sound     ICU Vital Signs Last 24 Hrs  T(C): 37.3 (12 Nov 2023 14:15), Max: 37.3 (12 Nov 2023 14:15)  T(F): 99.1 (12 Nov 2023 14:15), Max: 99.1 (12 Nov 2023 14:15)  HR: 70 (12 Nov 2023 14:15) (62 - 87)  BP: 140/110 (12 Nov 2023 14:15) (101/54 - 146/69)  BP(mean): 106 (11 Nov 2023 21:50) (106 - 106)  ABP: --  ABP(mean): --  RR: 20 (12 Nov 2023 14:15) (18 - 20)  SpO2: 92% (12 Nov 2023 14:15) (92% - 99%)    O2 Parameters below as of 12 Nov 2023 14:15  Patient On (Oxygen Delivery Method): nasal cannula  O2 Flow (L/min): 4    P/E:  NAD  AAOx1-2(place- [place]), right sided eye lid drooping and facial drooping noted. no weakness in extremities   S1S2 WNL  BL transmitted sound from upper airways   Abd soft, non tender, BS present   BLLE no edema or calf tenderness     I reviewed CBC, BMP, Mg, Phos, LFTs, troponin and culture results.   I ordered repeat CBC, BMP, troponin and formulated the plan as below    A/P  Acute hypoxic respiratory failure due to Coronavirus (non covid) infection   Right sided facial droop   Type II MI due to demand ischemia due to hypoxia   HTN  Dementia     Plan:  Cont NC O2, titrate down as tolerated   patient needs frequent suctioning of airways  Cont Albuterol and Symbicort   Follow cultures, NGTD  Trop trended down. EKG noted   Obtain official speech and swallow as family reports coughing sometimes when eating   Neuro consulted for facial droop. Noticed by family on Friday  Rest of the management as above

## 2023-11-12 NOTE — PROGRESS NOTE ADULT - PROBLEM SELECTOR PLAN 1
p/w fever, HR >90, hypotension, lactate 1.9  RVP +  Had +upper airway sounds, improved after nasal suction  received 2.4L ns bolus  Viral URI, supportive care, primary team to reassess    f/u blood cultures, urine cultures  ua negative  tylenol prn p/w fever, HR >90, hypotension, lactate 1.9  patient is afebrile and normotensive does not meet SIRS criteria.  RVP + coronavirus (not COVID 19)  Had +upper airway sounds, improved after nasal suction  on 3L NC saturating at 99%  home med: trelegy 200/ 52.5/25 and albuterol 90mcg  start: symbicort 160 BID and albuterol prn   oropharyngeal suctioning TID pt has a right sided facial droop with right eyelid droop.  family mentions noticing only the right eyelid droop on 11/10/23  difficulty swallowing liquids and solids  speech and swallow consulted  NPO till s&s eval  f/u CT Head pt has a right sided facial droop with right eyelid droop.  family mentions noticing only the right eyelid droop on 11/10/23  difficulty swallowing liquids and solids  speech and swallow consulted  NPO till s&s eval  f/u CT Head  Neuro (Dr. Humphries) consulted

## 2023-11-12 NOTE — PROGRESS NOTE ADULT - PROBLEM SELECTOR PLAN 4
c/w meds after med rec home med: losartan,   c/w home med B/L inflamed conjunctivae - likely viral with secondary bacterial infection   Would place topical antibiotics eyedrops

## 2023-11-12 NOTE — PHYSICAL THERAPY INITIAL EVALUATION ADULT - PLANNED THERAPY INTERVENTIONS, PT EVAL
aerobic capacity/endurance/balance training/bed mobility training/gait training/neuromuscular re-education/postural re-education/ROM/strengthening/transfer training/wheelchair management/propulsion training

## 2023-11-12 NOTE — PROGRESS NOTE ADULT - PROBLEM SELECTOR PLAN 2
B/L inflamed conjunctivae - likely viral with secondary bacterial infection   Would place topical antibiotics eyedrops RVP + coronavirus (not COVID 19)  Chest Xray; no consolidation  received 2.4L ns bolus  f/u blood cultures, urine cultures  ua negative  robitussin Dm syrup q6h  tylenol prn  Supportive care p/w fever, HR >90, hypotension, lactate 1.9  patient is afebrile and normotensive does not meet SIRS criteria.  RVP + coronavirus (not COVID 19)  Had +upper airway sounds, improved after nasal suction  on 3L NC saturating at 99% weaned off oxygen today.   home med: trelegy 200/ 52.5/25 and albuterol 90mcg  start: symbicort 160 BID and albuterol prn   oropharyngeal suctioning TID

## 2023-11-12 NOTE — PHYSICAL THERAPY INITIAL EVALUATION ADULT - GENERAL OBSERVATIONS, REHAB EVAL
Consult received, chart reviewed. Patient received supine in bed, NAD, +tele. Patient agreed to EVALUATION from Physical Therapist. RN suctioned pt. just prior to session

## 2023-11-13 NOTE — SWALLOW BEDSIDE ASSESSMENT ADULT - SWALLOW EVAL: DIAGNOSIS
Pt p/w s&s oropharyngeal dysphagia c/b decreased bolus formation, slow A-P transport w/ base-of-tongue pumping, delayed swallow trigger, & decreased hyolaryngeal elevation. Cough on thin liquid trials & PO trials of larger presentation size. Wet congestion & cough was intermittent throughout entire exam. Pt would benefit from objective assessment.

## 2023-11-13 NOTE — PROGRESS NOTE ADULT - SUBJECTIVE AND OBJECTIVE BOX
***TEMPLATE ONLY***    Neurology Follow up note    Name  KALA VO    HPI:  83 y.o. M with PMHx of HTN, dementia, presenting with generalized weakness and shortness of breath. Family at the bedside providing history, state that for the past 2 days pt has been feeling ill, has a cough, and has increased shortness of breath on exertion. Early yesterday the patient developed cough and congestion. They decided to bring him to the ED today after he was unable to walk on his own when he is usually able to walk with a cane. Family deny any pt complaints of any chest pain, palpitations, fever, chills, headache, visual changes, nausea, vomiting diarrhea. NKDA    In the ED   /79 , T 102.9, RR 22, 95% on ra   CXR- no acute infiltrates on wet read  Trop 1- 108  EKG NSR no acute ischemic changes on wet read   RVP +  UA - neg   ABG wnl   (11 Nov 2023 06:14)      Interval History -        Subjective:    Review of Systems:  Constitutional:        Eyes, Ears, Mouth, Throat:   Respiratory:                            Cardiovascular:   Gastrointestinal:                                     Genitourinary:   Musculoskeletal:                                    Dermatologic:   Neurological: as per above                                                                 Psychiatric:   Endocrine:              Hematologic/Lymphatic:     MEDICATIONS  (STANDING):  allopurinol 300 milliGRAM(s) Oral daily  budesonide 160 MICROgram(s)/formoterol 4.5 MICROgram(s) Inhaler 2 Puff(s) Inhalation two times a day  dextrose 5% + sodium chloride 0.9%. 1000 milliLiter(s) (70 mL/Hr) IV Continuous <Continuous>  donepezil 5 milliGRAM(s) Oral at bedtime  enoxaparin Injectable 40 milliGRAM(s) SubCutaneous every 24 hours  guaiFENesin  milliGRAM(s) Oral every 12 hours  lactated ringers. 1000 milliLiter(s) (75 mL/Hr) IV Continuous <Continuous>  lidocaine 2% Gel 1 Application(s) Topical once  losartan 50 milliGRAM(s) Oral daily  pantoprazole    Tablet 40 milliGRAM(s) Oral before breakfast  risperiDONE   Tablet 0.5 milliGRAM(s) Oral at bedtime  senna 2 Tablet(s) Oral at bedtime  trimethoprim/polymyxin Solution 1 Drop(s) Both EYES two times a day    MEDICATIONS  (PRN):  acetaminophen     Tablet .. 650 milliGRAM(s) Oral every 6 hours PRN Temp greater or equal to 38C (100.4F), Mild Pain (1 - 3)  albuterol    90 MICROgram(s) HFA Inhaler 2 Puff(s) Inhalation every 6 hours PRN Shortness of Breath and/or Wheezing      Allergies    No Known Allergies    Intolerances        Objective:   Vital Signs Last 24 Hrs  T(C): 36.8 (13 Nov 2023 05:11), Max: 38.2 (12 Nov 2023 17:38)  T(F): 98.2 (13 Nov 2023 05:11), Max: 100.8 (12 Nov 2023 17:38)  HR: 68 (13 Nov 2023 05:11) (68 - 88)  BP: 156/70 (13 Nov 2023 05:11) (123/67 - 157/76)  BP(mean): --  RR: 18 (13 Nov 2023 05:11) (18 - 20)  SpO2: 96% (13 Nov 2023 05:11) (92% - 96%)    Parameters below as of 13 Nov 2023 05:11  Patient On (Oxygen Delivery Method): room air        General Exam:   General appearance: No acute distress                 Cardiovascular: Pedal dorsalis pulses intact bilaterally    Neurological Exam:  Mental Status: Orientated to self, date and place.  Attention intact.  No dysarthria, aphasia or neglect.  Knowledge intact.  Registration intact.  Short and long term memory grossly intact.      Cranial Nerves: CN I - not tested.  PERRL, EOMI, VFF, no nystagmus or diplopia.  No APD.  Fundi not visualized bilaterally.  CN V1-3 intact to light touch and pinprick.  No facial asymmetry.  Hearing intact to finger rub bilaterally.  Tongue, uvula and palate midline.  Sternocleidomastoid and Trapezius intact bilaterally.    Motor:   Tone: normal.                  Strength: intact throughout  Pronator drift: none                 Dysmeria: None to finger-nose-finger or heel-shin-heel  No truncal ataxia.    Tremor: No resting, postural or action tremor.  No myoclonus.    Sensation: intact to light touch, pinprick, vibration and proprioception    Deep Tendon Reflexes: 1+ bilateral biceps, triceps, brachioradialis, knee and ankle  Toes flexor bilaterally    Gait: normal and stable.      Other:    11-13    139  |  110<H>  |  18  ----------------------------<  126<H>  3.9   |  24  |  0.92    Ca    8.7      13 Nov 2023 06:24  Phos  2.8     11-13  Mg     2.4     11-13 11-13    139  |  110<H>  |  18  ----------------------------<  126<H>  3.9   |  24  |  0.92    Ca    8.7      13 Nov 2023 06:24  Phos  2.8     11-13  Mg     2.4     11-13          Radiology                  Neurology Follow up note    Name  KALA VO    Subjective:  no new neuro c/o    Review of Systems:  Constitutional:      + fever  Respiratory:        + sputum production                      MEDICATIONS  (STANDING):  allopurinol 300 milliGRAM(s) Oral daily  budesonide 160 MICROgram(s)/formoterol 4.5 MICROgram(s) Inhaler 2 Puff(s) Inhalation two times a day  dextrose 5% + sodium chloride 0.9%. 1000 milliLiter(s) (70 mL/Hr) IV Continuous <Continuous>  donepezil 5 milliGRAM(s) Oral at bedtime  enoxaparin Injectable 40 milliGRAM(s) SubCutaneous every 24 hours  guaiFENesin  milliGRAM(s) Oral every 12 hours  lactated ringers. 1000 milliLiter(s) (75 mL/Hr) IV Continuous <Continuous>  lidocaine 2% Gel 1 Application(s) Topical once  losartan 50 milliGRAM(s) Oral daily  pantoprazole    Tablet 40 milliGRAM(s) Oral before breakfast  risperiDONE   Tablet 0.5 milliGRAM(s) Oral at bedtime  senna 2 Tablet(s) Oral at bedtime  trimethoprim/polymyxin Solution 1 Drop(s) Both EYES two times a day    MEDICATIONS  (PRN):  acetaminophen     Tablet .. 650 milliGRAM(s) Oral every 6 hours PRN Temp greater or equal to 38C (100.4F), Mild Pain (1 - 3)  albuterol    90 MICROgram(s) HFA Inhaler 2 Puff(s) Inhalation every 6 hours PRN Shortness of Breath and/or Wheezing      Allergies    No Known Allergies    Intolerances        Objective:   Vital Signs Last 24 Hrs  T(C): 36.8 (13 Nov 2023 05:11), Max: 38.2 (12 Nov 2023 17:38)  T(F): 98.2 (13 Nov 2023 05:11), Max: 100.8 (12 Nov 2023 17:38)  HR: 68 (13 Nov 2023 05:11) (68 - 88)  BP: 156/70 (13 Nov 2023 05:11) (123/67 - 157/76)  BP(mean): --  RR: 18 (13 Nov 2023 05:11) (18 - 20)  SpO2: 96% (13 Nov 2023 05:11) (92% - 96%)    Parameters below as of 13 Nov 2023 05:11  Patient On (Oxygen Delivery Method): room air      Mental Status: Not Orientated to self, date and place.  Obtunded.  Unable to properly eval dysarthria, aphasia or neglect, Knowledge, Registration and memory.     Cranial Nerves: CN I - not tested.  PERRL, blinks to treat bl temporal areas.  CN V1-3 intact to light touch.  right facial weakness, with bl eyelid weakness worse on the right side.     Motor:   Tone: normal.                  Strength: decreased throughout  Patient does not corporate with dysmetria on finger-nose-finger or heel-shin-heel  No truncal ataxia.  No resting, postural or action tremor.  No myoclonus.    Sensation: intact to light touch      Other:    11-13    139  |  110<H>  |  18  ----------------------------<  126<H>  3.9   |  24  |  0.92    Ca    8.7      13 Nov 2023 06:24  Phos  2.8     11-13  Mg     2.4     11-13 11-13    139  |  110<H>  |  18  ----------------------------<  126<H>  3.9   |  24  |  0.92    Ca    8.7      13 Nov 2023 06:24  Phos  2.8     11-13  Mg     2.4     11-13          Radiology           mr< from: Transthoracic Echocardiogram (11.13.23 @ 08:07) >  1. Normal mitral valve. Mild mitral regurgitation.  2. Calcified trileaflet aortic valve with normal opening.  No aortic stenosis. Mild aortic regurgitation.  3. Aortic Root: 3.8 cm. Ascending Aorta: 4 cm.  Borderline dilated aortic root; dilated ascending aorta.  Consider CT chest for further evaluation of aorta if  clinically indicated.  4. Mildly increased left ventricular wall thickness.  5. Normal Left Ventricular Systolic Function,  (EF = 64% by  biplane) No regional wall motion abnormalities.  6. The diastolic function is indeterminate based on current  diagnostic criteria.  7. Normal right ventricular size and systolic function  (TAPSE 3.0 cm).  8. RA Pressure is 3 mm Hg.  9. RV systolic pressure is mildly increased at  41 mm Hg.  10. Normal tricuspid valve. Mild tricuspid regurgitation;  eccentric jet severity may be underestimated.  11. Normal pulmonic valve. Mild pulmonic insufficiency is  noted.  12. Trivial pericardial effusion is seen.    < end of copied text >

## 2023-11-13 NOTE — SWALLOW FEES ASSESSMENT ADULT - SLP GENERAL OBSERVATIONS
Pt seen seated upright at edge of bed; [facility] unit adjacent to Pt. Scope placed through left nostril. Velopharyngeal movement intact on /k^/. Edema noted on pharyngeal & laryngeal structures. Baseline secretions in laryngeal vestibule. VF symmetry noted.

## 2023-11-13 NOTE — PROGRESS NOTE ADULT - PROBLEM SELECTOR PLAN 2
p/w fever, HR >90, hypotension, lactate 1.9  patient is afebrile and normotensive does not meet SIRS criteria.  RVP + coronavirus (not COVID 19)  Had +upper airway sounds, improved after nasal suction  on 3L NC saturating at 99% weaned off oxygen today.   home med: trelegy 200/ 52.5/25 and albuterol 90mcg  start: symbicort 160 BID and albuterol prn   oropharyngeal suctioning TID p/w fever, HR >90, hypotension, lactate 1.9  patient is afebrile and normotensive does not meet SIRS criteria.  RVP + coronavirus (not COVID 19)  Had +upper airway sounds, improved after nasal suction  on 3L NC saturating at 99% weaned off oxygen today.   home med: trelegy 200/ 52.5/25 and albuterol 90mcg  start: duonebs  oropharyngeal suctioning TID

## 2023-11-13 NOTE — DIETITIAN INITIAL EVALUATION ADULT - PERTINENT MEDS FT
MEDICATIONS  (STANDING):  allopurinol 300 milliGRAM(s) Oral daily  budesonide 160 MICROgram(s)/formoterol 4.5 MICROgram(s) Inhaler 2 Puff(s) Inhalation two times a day  dextrose 5% + sodium chloride 0.9%. 1000 milliLiter(s) (70 mL/Hr) IV Continuous <Continuous>  donepezil 5 milliGRAM(s) Oral at bedtime  enoxaparin Injectable 40 milliGRAM(s) SubCutaneous every 24 hours  guaiFENesin  milliGRAM(s) Oral every 12 hours  lactated ringers. 1000 milliLiter(s) (75 mL/Hr) IV Continuous <Continuous>  losartan 50 milliGRAM(s) Oral daily  pantoprazole    Tablet 40 milliGRAM(s) Oral before breakfast  risperiDONE   Tablet 0.5 milliGRAM(s) Oral at bedtime  senna 2 Tablet(s) Oral at bedtime  trimethoprim/polymyxin Solution 1 Drop(s) Both EYES two times a day    MEDICATIONS  (PRN):  acetaminophen     Tablet .. 650 milliGRAM(s) Oral every 6 hours PRN Temp greater or equal to 38C (100.4F), Mild Pain (1 - 3)  albuterol    90 MICROgram(s) HFA Inhaler 2 Puff(s) Inhalation every 6 hours PRN Shortness of Breath and/or Wheezing

## 2023-11-13 NOTE — DIETITIAN INITIAL EVALUATION ADULT - OTHER INFO
Pt lives home with spouse and [facility]TA, wife recently  23, family unable to care for pt at home; alert, confused with dementia, Limited intake/wt change history data available at present; 51-75% intake per flowsheet, coughing with liquid and solid, s/p swallow evaluation today with NPO recommended, "FEES; Fiberoptic endoscopic evaluation of swallowing (FEES) to r/o aspiration, determine candidacy for PO" per SLP; s/p  consult, possible pending discharge planning to skilled nursing facility when medically ready per team; Unknown food allergies per Chart    Pt lives home with spouse and [facility] help PTA, wife recently  23, family unable to care for pt at home; alert, confused with dementia, Limited intake/wt change history data available at present; NPO x 1-2d, coughing with liquid and solid, s/p swallow evaluation today with NPO recommended, "FEES; Fiberoptic endoscopic evaluation of swallowing (FEES) to r/o aspiration, determine candidacy for PO" per SLP; s/p  consult, possible pending discharge planning to skilled nursing facility when medically ready per team; Unknown food allergies per Chart

## 2023-11-13 NOTE — SWALLOW BEDSIDE ASSESSMENT ADULT - MODE OF PRESENTATION
spoon x 3; cup x 3/cup/spoon/fed by clinician spoon x 3; cup x 1/cup/fed by clinician spoon/fed by clinician x 5/spoon/fed by clinician

## 2023-11-13 NOTE — DIETITIAN INITIAL EVALUATION ADULT - SIGNS/SYMPTOMS
coughing with liquid/solid, NPO per Swallow evaluation today Pressure Injury: stage I, II  coughing with liquid/solid, NPO x 1-2d, failed Swallow bedside evaluation today

## 2023-11-13 NOTE — DIETITIAN INITIAL EVALUATION ADULT - DIET TYPE
Advance diet or consider alternate nutrition support if NPO prolonged further as medically feasible/if consistent with Goal of Care/colostomy Advance diet or consider alternate nutrition support if NPO prolonged further as medically feasible/if consistent with Goal of Care

## 2023-11-13 NOTE — SWALLOW FEES ASSESSMENT ADULT - PRELIMINARY ENDOSCOPIC EXAMINATIONS
Interarytenoid/post-commissure edema/Interarytenoid/Arytenoid edema/Vocal fold edema/Baseline pooling of secretions/Baseline aspiration of secretions/Baseline penetration of secretions/Velopharyngeal movement

## 2023-11-13 NOTE — SWALLOW BEDSIDE ASSESSMENT ADULT - ASR SWALLOW RECOMMEND DIAG
Fiberoptic endoscopic evaluation of swallowing (FEES) to r/o aspiration, determine candidacy for PO feeding./FEES

## 2023-11-13 NOTE — PROGRESS NOTE ADULT - PROBLEM SELECTOR PLAN 1
pt has a right sided facial droop with right eyelid droop.  family mentions noticing only the right eyelid droop on 11/10/23  difficulty swallowing liquids and solids  speech and swallow consulted  NPO till s&s eval  f/u CT Head  Neuro (Dr. Humphries) consulted

## 2023-11-13 NOTE — DIETITIAN INITIAL EVALUATION ADULT - NSFNSPHYEXAMSKINFT_GEN_A_CORE
Pressure Injury 1: sacrum, Stage I  Pressure Injury 2: buttocks, Right:, Stage II  Pressure Injury 3: none, none  Pressure Injury 4: none, none  Pressure Injury 5: none, none  Pressure Injury 6: none, none  Pressure Injury 7: none, none  Pressure Injury 8: none, none  Pressure Injury 9: none, none  Pressure Injury 10: none, none  Pressure Injury 11: none, none Pressure Injury 1: sacrum, Stage I  Pressure Injury 2: buttocks, Right:, Stage II

## 2023-11-13 NOTE — PROGRESS NOTE ADULT - ATTENDING COMMENTS
Patient was seen and examined at bedside.    ICU Vital Signs Last 24 Hrs  T(C): 36.8 (13 Nov 2023 13:54), Max: 37.3 (12 Nov 2023 21:49)  T(F): 98.3 (13 Nov 2023 13:54), Max: 99.1 (12 Nov 2023 21:49)  HR: 75 (13 Nov 2023 13:54) (68 - 77)  BP: 152/74 (13 Nov 2023 13:54) (152/74 - 157/76)  BP(mean): --  ABP: --  ABP(mean): --  RR: 18 (13 Nov 2023 13:54) (18 - 19)  SpO2: 96% (13 Nov 2023 13:54) (94% - 96%)    O2 Parameters below as of 13 Nov 2023 13:54  Patient On (Oxygen Delivery Method): room air      P/E:  NAD  AAOx1-2(place- [place]), right sided eye lid drooping and facial drooping noted. no weakness in extremities  S1S2 WNL  BL transmitted sound from upper airways  Abd soft, non tender, BS present  BLLE no edema or calf tenderness    I reviewed CBC, BMP, Mg, Phos, LFTs, troponin and culture results.  I ordered repeat CBC, BMP and formulated the plan as below  Discussed with Dr. Howell    A/P  Acute hypoxic respiratory failure due to Coronavirus (non covid) infection  Right sided facial droop concern for stroke vs Bell's palsy  Type II MI due to demand ischemia due to hypoxia  HTN  Dementia    Plan:  Secretions improved with NS nebulization  Cont NC O2, titrate down as tolerated  patient needs frequent suctioning of airways  Cont Albuterol and Symbicort  Follow cultures, NGTD  Trop trended down. EKG noted  Official speech and swallow eval noted, keep NPO except meds and IVF, pending palliative care consult  Neuro consulted for facial droop. Pending MRI, started on rectal aspirin  Rest of the management as above Patient was seen and examined at bedside.    ICU Vital Signs Last 24 Hrs  T(C): 36.8 (13 Nov 2023 13:54), Max: 37.3 (12 Nov 2023 21:49)  T(F): 98.3 (13 Nov 2023 13:54), Max: 99.1 (12 Nov 2023 21:49)  HR: 75 (13 Nov 2023 13:54) (68 - 77)  BP: 152/74 (13 Nov 2023 13:54) (152/74 - 157/76)  BP(mean): --  ABP: --  ABP(mean): --  RR: 18 (13 Nov 2023 13:54) (18 - 19)  SpO2: 96% (13 Nov 2023 13:54) (94% - 96%)    O2 Parameters below as of 13 Nov 2023 13:54  Patient On (Oxygen Delivery Method): room air      P/E:  NAD  AAOx1-2(place- [place]), right sided eye lid drooping and facial drooping noted. no weakness in extremities  S1S2 WNL  BL transmitted sound from upper airways  Abd soft, non tender, BS present  BLLE no edema or calf tenderness    I reviewed CBC, BMP, Mg, Phos, LFTs, troponin and culture results.  I ordered repeat CBC, BMP and formulated the plan as below  Discussed with Dr. Howell    A/P  Acute hypoxic respiratory failure due to Coronavirus (non covid) infection  Right sided facial droop concern for stroke vs Bell's palsy  Type II MI due to demand ischemia due to hypoxia  HTN  Dementia    Plan:  Secretions improved with NS nebulization  Cont NC O2, titrate down as tolerated  patient needs frequent suctioning of airways  Cont Albuterol and Symbicort  Follow cultures, NGTD  Trop trended down. EKG noted  Official speech and swallow eval noted, keep NPO except meds and IVF, pending palliative care consult  Neuro consulted for facial droop. Pending MRI, start on rectal aspirin  Rest of the management as above

## 2023-11-13 NOTE — SWALLOW BEDSIDE ASSESSMENT ADULT - ORAL PREPARATORY PHASE
Reduced oral grading/Decreased mastication ability reduced pucker to cup & spoon presentation/Reduced oral grading Reduced oral grading reduced pucker to cup presentation/Reduced oral grading

## 2023-11-13 NOTE — PROGRESS NOTE ADULT - ASSESSMENT
83 y.o. M with PMHx of HTN, dementia, presenting with generalized weakness and shortness of breath. Admitted for sepsis 2/2 AHRF.

## 2023-11-13 NOTE — PROGRESS NOTE ADULT - SUBJECTIVE AND OBJECTIVE BOX
PGY-1 Progress Note discussed with attending    PAGER #: [[phone]] TILL 5:00 PM  PLEASE CONTACT ON CALL TEAM:  - On Call Team (Please refer to Darion) FROM 5:00 PM - 8:30PM  - Nightfloat Team FROM 8:30 -7:30 AM    CHIEF COMPLAINT & BRIEF HOSPITAL COURSE:    INTERVAL HPI/OVERNIGHT EVENTS:   MEDICATIONS  (STANDING):  allopurinol 300 milliGRAM(s) Oral daily  budesonide 160 MICROgram(s)/formoterol 4.5 MICROgram(s) Inhaler 2 Puff(s) Inhalation two times a day  dextrose 5% + sodium chloride 0.9%. 1000 milliLiter(s) (70 mL/Hr) IV Continuous <Continuous>  donepezil 5 milliGRAM(s) Oral at bedtime  enoxaparin Injectable 40 milliGRAM(s) SubCutaneous every 24 hours  guaiFENesin  milliGRAM(s) Oral every 12 hours  lactated ringers. 1000 milliLiter(s) (75 mL/Hr) IV Continuous <Continuous>  losartan 50 milliGRAM(s) Oral daily  pantoprazole    Tablet 40 milliGRAM(s) Oral before breakfast  risperiDONE   Tablet 0.5 milliGRAM(s) Oral at bedtime  senna 2 Tablet(s) Oral at bedtime  trimethoprim/polymyxin Solution 1 Drop(s) Both EYES two times a day    MEDICATIONS  (PRN):  acetaminophen     Tablet .. 650 milliGRAM(s) Oral every 6 hours PRN Temp greater or equal to 38C (100.4F), Mild Pain (1 - 3)  albuterol    90 MICROgram(s) HFA Inhaler 2 Puff(s) Inhalation every 6 hours PRN Shortness of Breath and/or Wheezing      REVIEW OF SYSTEMS:  CONSTITUTIONAL: No fever, weight loss, or fatigue  RESPIRATORY: No shortness of breath  CARDIOVASCULAR: No chest pain  GASTROINTESTINAL: No abdominal pain.  GENITOURINARY: No dysuria  NEUROLOGICAL: No headaches  SKIN: No itching, burning, rashes    Vital Signs Last 24 Hrs  T(C): 36.8 (13 Nov 2023 05:11), Max: 38.2 (12 Nov 2023 17:38)  T(F): 98.2 (13 Nov 2023 05:11), Max: 100.8 (12 Nov 2023 17:38)  HR: 68 (13 Nov 2023 05:11) (62 - 88)  BP: 156/70 (13 Nov 2023 05:11) (101/54 - 157/76)  BP(mean): --  RR: 18 (13 Nov 2023 05:11) (18 - 20)  SpO2: 96% (13 Nov 2023 05:11) (92% - 99%)    Parameters below as of 13 Nov 2023 05:11  Patient On (Oxygen Delivery Method): room air        PHYSICAL EXAMINATION:  GENERAL: NAD, well built  HEAD:  Atraumatic, Normocephalic  EYES:  conjunctiva and sclera clear  CHEST/LUNG: Clear to auscultation. No rales, rhonchi, wheezing, or rubs  HEART: Regular rate and rhythm; No murmurs, rubs, or gallops  ABDOMEN: Soft, Nontender, Nondistended; Bowel sounds present  NERVOUS SYSTEM:  Alert & Oriented X3,    EXTREMITIES:  2+ Peripheral Pulses, No clubbing, cyanosis, or edema  SKIN: warm dry                          12.4   5.11  )-----------( 177      ( 13 Nov 2023 06:24 )             37.3     11-13    139  |  110<H>  |  18  ----------------------------<  126<H>  3.9   |  24  |  0.92    Ca    8.7      13 Nov 2023 06:24  Phos  2.8     11-13  Mg     2.4     11-13                CAPILLARY BLOOD GLUCOSE      RADIOLOGY & ADDITIONAL TESTS:                   PGY-1 Progress Note discussed with attending    PAGER #: [[phone]] TILL 5:00 PM  PLEASE CONTACT ON CALL TEAM:  - On Call Team (Please refer to Darion) FROM 5:00 PM - 8:30PM  - Nightfloat Team FROM 8:30 -7:30 AM      INTERVAL HPI/OVERNIGHT EVENTS: Patient seen at bedside wit . Patient mentions : "not feeling well" "cough".   on observation he has audible secretions     MEDICATIONS  (STANDING):  allopurinol 300 milliGRAM(s) Oral daily  budesonide 160 MICROgram(s)/formoterol 4.5 MICROgram(s) Inhaler 2 Puff(s) Inhalation two times a day  dextrose 5% + sodium chloride 0.9%. 1000 milliLiter(s) (70 mL/Hr) IV Continuous <Continuous>  donepezil 5 milliGRAM(s) Oral at bedtime  enoxaparin Injectable 40 milliGRAM(s) SubCutaneous every 24 hours  guaiFENesin  milliGRAM(s) Oral every 12 hours  lactated ringers. 1000 milliLiter(s) (75 mL/Hr) IV Continuous <Continuous>  losartan 50 milliGRAM(s) Oral daily  pantoprazole    Tablet 40 milliGRAM(s) Oral before breakfast  risperiDONE   Tablet 0.5 milliGRAM(s) Oral at bedtime  senna 2 Tablet(s) Oral at bedtime  trimethoprim/polymyxin Solution 1 Drop(s) Both EYES two times a day    MEDICATIONS  (PRN):  acetaminophen     Tablet .. 650 milliGRAM(s) Oral every 6 hours PRN Temp greater or equal to 38C (100.4F), Mild Pain (1 - 3)  albuterol    90 MICROgram(s) HFA Inhaler 2 Puff(s) Inhalation every 6 hours PRN Shortness of Breath and/or Wheezing      REVIEW OF SYSTEMS:  CONSTITUTIONAL: No fever, weight loss, or fatigue  RESPIRATORY: No shortness of breath  CARDIOVASCULAR: No chest pain  GASTROINTESTINAL: No abdominal pain.  GENITOURINARY: No dysuria  NEUROLOGICAL: No headaches  SKIN: No itching, burning, rashes    Vital Signs Last 24 Hrs  T(C): 36.8 (13 Nov 2023 05:11), Max: 38.2 (12 Nov 2023 17:38)  T(F): 98.2 (13 Nov 2023 05:11), Max: 100.8 (12 Nov 2023 17:38)  HR: 68 (13 Nov 2023 05:11) (62 - 88)  BP: 156/70 (13 Nov 2023 05:11) (101/54 - 157/76)  BP(mean): --  RR: 18 (13 Nov 2023 05:11) (18 - 20)  SpO2: 96% (13 Nov 2023 05:11) (92% - 99%)    Parameters below as of 13 Nov 2023 05:11  Patient On (Oxygen Delivery Method): room air        PHYSICAL EXAMINATION:  GENERAL: NAD, well built  HEAD:  Atraumatic, Normocephalic  EYES:  conjunctiva and sclera clear  CHEST/LUNG: Clear to auscultation. No rales, rhonchi, wheezing, or rubs  HEART: Regular rate and rhythm; No murmurs, rubs, or gallops  ABDOMEN: Soft, Nontender, Nondistended; Bowel sounds present  NERVOUS SYSTEM:  Alert & Oriented X3,    EXTREMITIES:  2+ Peripheral Pulses, No clubbing, cyanosis, or edema  SKIN: warm dry                          12.4   5.11  )-----------( 177      ( 13 Nov 2023 06:24 )             37.3     11-13    139  |  110<H>  |  18  ----------------------------<  126<H>  3.9   |  24  |  0.92    Ca    8.7      13 Nov 2023 06:24  Phos  2.8     11-13  Mg     2.4     11-13                CAPILLARY BLOOD GLUCOSE      RADIOLOGY & ADDITIONAL TESTS:

## 2023-11-13 NOTE — DIETITIAN INITIAL EVALUATION ADULT - FACTORS AFF FOOD INTAKE
acute on chronic comorbidities including CVA, dementia/change in mental status/difficulty chewing/difficulty feeding self/difficulty swallowing/difficulty with food procurement/preparation/Jainism/ethnic/cultural/personal food preferences

## 2023-11-13 NOTE — SWALLOW FEES ASSESSMENT ADULT - SPECIFY REASON(S)
Pt seen for Fiberoptic endoscopic evaluation of swallowing (FEES) to assess for possible diet upgrade and r/o aspiration.

## 2023-11-13 NOTE — CONSULT NOTE ADULT - TIME BILLING
- personally reviewed pt's labs, VS/flowsheets, pertinent imaging, and consultant notes  - bedside SpO2 measurement to determine supplemental O2 needs  - general pulm hx/exam  - medication reconciliation  - coordination of care with primary team

## 2023-11-13 NOTE — PROGRESS NOTE ADULT - PROBLEM SELECTOR PLAN 4
B/L inflamed conjunctivae - likely viral with secondary bacterial infection   Would place topical antibiotics eyedrops

## 2023-11-13 NOTE — PROGRESS NOTE ADULT - ASSESSMENT
Impression:  The patient has dementia and pw fever and cough neurology called for right facial weakness, patient not cooperative with exam and evaluation is limited but there is concern for CVA vs Bell's palsy.     Recommendations:  1.              MRI brain and if +luan for acute stroke then get MRA head without contrast, Carotid duplex (CD), TTE and tele x 24hours, check HbA1C and fasting lipid profile and Secondary stroke prevention: ASA 81mg (or ASA 325mg rectally if unable to take p) and Lipitor 40mg HS; Plavix x 3 weeks.  2.  If there is no acute CVA, then consider starting Prednisone and Acyclovir for Bell's Plasy  3.  Eval and treatment for fever and cough as per primary team    Thank you for the courtesy of this consult.

## 2023-11-13 NOTE — SWALLOW FEES ASSESSMENT ADULT - ADDITIONAL RECOMMENDATIONS
[place] Free Water Protocol: Pt may have ice chips any time ONLY after oral care is completed, for QOL.     + May consider alternative means of nutrition/hydration if in alignment with pt's/HCP's goals of care.

## 2023-11-13 NOTE — SWALLOW FEES ASSESSMENT ADULT - COMMENTS
Impression: S/P COMPLEX Cataract Extraction by phacoemulsification with IOL placement; ORA OS - 1 Day. Encounter for surgical aftercare following surgery on a sense organ  Z48.810. Excellent post op course   Post operative instructions reviewed - Pt advised vision should improve over the next couple days as swelling resolves.  Plan: Ok to proceed with surgery 2nd eye Green-tinted Purees & Moderately thick liquids trials given.

## 2023-11-13 NOTE — CONSULT NOTE ADULT - SUBJECTIVE AND OBJECTIVE BOX
PULMONARY SERVICE INITIAL CONSULT NOTE    HPI:  83 y.o. M with PMHx of HTN, dementia, presenting with generalized weakness and shortness of breath. Family at the bedside providing history, state that for the past 2 days pt has been feeling ill, has a cough, and has increased shortness of breath on exertion. Early yesterday the patient developed cough and congestion. They decided to bring him to the ED today after he was unable to walk on his own when he is usually able to walk with a cane. Family deny any pt complaints of any chest pain, palpitations, fever, chills, headache, visual changes, nausea, vomiting diarrhea. NKDA    PULM HPI ([employer] - donny Morelos Bandar #[id number]):   [Pt not fully participatory in interview despite  repeating many questions]. Denies prior pulmonary PMH and never seen pulmonologist. He quit smoking in 1996; only smoked 4-6 cigarettes a day. Feels he's been coughing a bit more over last month. Sometimes dry, other times brings up white phlegm -- not sure if it's saliva. Currently denies respiratory complaints. Per primary team, had a lot of mucus and sounded wet earlier in AM requiring deep suctioning.     REVIEW OF SYSTEMS (limited by patient participation in interview):  See above    PAST MEDICAL & SURGICAL HISTORY:  HTN (hypertension)    Dementia    FAMILY HISTORY:  Unable to assess    SOCIAL HISTORY:  Smoking Status: [ ] Current, [X] Former, [ ] Never  Pack Years: ~10-15py difficult to quantify; smoked 4-6cig/day and quit 1996    MEDICATIONS:  Pulmonary:  albuterol    90 MICROgram(s) HFA Inhaler 2 Puff(s) Inhalation every 6 hours PRN  budesonide 160 MICROgram(s)/formoterol 4.5 MICROgram(s) Inhaler 2 Puff(s) Inhalation two times a day  guaiFENesin  milliGRAM(s) Oral every 12 hours    Antimicrobials:    Anticoagulants:  enoxaparin Injectable 40 milliGRAM(s) SubCutaneous every 24 hours    Onc:    GI/:  pantoprazole    Tablet 40 milliGRAM(s) Oral before breakfast  senna 2 Tablet(s) Oral at bedtime    Endocrine:  allopurinol 300 milliGRAM(s) Oral daily    Cardiac:  losartan 50 milliGRAM(s) Oral daily    Other Medications:  acetaminophen     Tablet .. 650 milliGRAM(s) Oral every 6 hours PRN  dextrose 5% + sodium chloride 0.9%. 1000 milliLiter(s) IV Continuous <Continuous>  donepezil 5 milliGRAM(s) Oral at bedtime  lactated ringers. 1000 milliLiter(s) IV Continuous <Continuous>  lidocaine 2% Gel 1 Application(s) Topical once  risperiDONE   Tablet 0.5 milliGRAM(s) Oral at bedtime  trimethoprim/polymyxin Solution 1 Drop(s) Both EYES two times a day    Allergies    No Known Allergies    Intolerances    Vital Signs Last 24 Hrs  T(C): 36.8 (13 Nov 2023 05:11), Max: 38.2 (12 Nov 2023 17:38)  T(F): 98.2 (13 Nov 2023 05:11), Max: 100.8 (12 Nov 2023 17:38)  HR: 68 (13 Nov 2023 05:11) (68 - 88)  BP: 156/70 (13 Nov 2023 05:11) (123/67 - 157/76)  BP(mean): --  RR: 18 (13 Nov 2023 05:11) (18 - 20)  SpO2: 96% (13 Nov 2023 05:11) (92% - 96%)    Parameters below as of 13 Nov 2023 05:11  Patient On (Oxygen Delivery Method): room air    PHYSICAL EXAM:  Constitutional: non-toxic elderly man resting in bed; NAD  Head: atraumatic  EENT: conjunctival injection b/l w/ right > left eyelid fullness, right droop; oropharynx clear, MMM, ?lip swelling  Neck: supple, no appreciable JVD  Respiratory: lungs CTA with good aeration to both bases; respirations appear non-labored  Cardiovascular: +S1/S2, RRR  Gastrointestinal: soft, NT/ND  Extremities: WWP; no edema, clubbing or cyanosis  Vascular: 2+ radial pulses B/L  Neurological: awake, somewhat constricted affect; right facial droop (per primary team, finding has been noted already- full strength testing deferred, pt undergoing CVA eval since admission)    LABS:  CBC Full  -  ( 13 Nov 2023 06:24 )  WBC Count : 5.11 K/uL  RBC Count : 3.94 M/uL  Hemoglobin : 12.4 g/dL  Hematocrit : 37.3 %  Platelet Count - Automated : 177 K/uL  Mean Cell Volume : 94.7 fl  Mean Cell Hemoglobin : 31.5 pg  Mean Cell Hemoglobin Concentration : 33.2 gm/dL  Auto Neutrophil # : x  Auto Lymphocyte # : x  Auto Monocyte # : x  Auto Eosinophil # : x  Auto Basophil # : x  Auto Neutrophil % : x  Auto Lymphocyte % : x  Auto Monocyte % : x  Auto Eosinophil % : x  Auto Basophil % : x    11-13    139  |  110<H>  |  18  ----------------------------<  126<H>  3.9   |  24  |  0.92    Ca    8.7      13 Nov 2023 06:24  Phos  2.8     11-13  Mg     2.4     11-13    Urinalysis Basic - ( 13 Nov 2023 06:24 )    Color: x / Appearance: x / SG: x / pH: x  Gluc: 126 mg/dL / Ketone: x  / Bili: x / Urobili: x   Blood: x / Protein: x / Nitrite: x   Leuk Esterase: x / RBC: x / WBC x   Sq Epi: x / Non Sq Epi: x / Bacteria: x    RADIOLOGY & ADDITIONAL STUDIES (personally reviewed):  < from: Xray Chest 1 View- PORTABLE-Urgent (11.11.23 @ 01:39) >  IMPRESSION: Clear lungs.

## 2023-11-13 NOTE — SWALLOW BEDSIDE ASSESSMENT ADULT - PHARYNGEAL PHASE
Delayed pharyngeal swallow/Decreased laryngeal elevation/Wet vocal quality post oral intake/Multiple swallows Delayed pharyngeal swallow/Decreased laryngeal elevation/Wet vocal quality post oral intake/Cough post oral intake/Throat clear post oral intake/Delayed cough post oral intake/Multiple swallows Delayed pharyngeal swallow/Decreased laryngeal elevation/Wet vocal quality post oral intake/Throat clear post oral intake/Delayed cough post oral intake/Multiple swallows

## 2023-11-13 NOTE — SWALLOW BEDSIDE ASSESSMENT ADULT - SLP PERTINENT HISTORY OF CURRENT PROBLEM
83 y.o. M with PMHx of HTN, [place], dementia, presenting with generalized weakness and increasing shortness of breath. Admitted for sepsis 2/2 AHRF. Found to have B/L inflamed conjunctivae - likely viral with secondary bacterial infection. RVP was checked and positive for coronavirus (not COVID-19). CXR negative for infiltrate.

## 2023-11-13 NOTE — SWALLOW FEES ASSESSMENT ADULT - ROSENBEK'S PENETRATION ASPIRATION SCALE
(6) material passes glottis, no subglottic residue remains (aspiration) (3) material remains above the vocal cords, visible residue remains (penetration)

## 2023-11-13 NOTE — DIETITIAN INITIAL EVALUATION ADULT - PERTINENT LABORATORY DATA
11-13    139  |  110<H>  |  18  ----------------------------<  126<H>  3.9   |  24  |  0.92    Ca    8.7      13 Nov 2023 06:24  Phos  2.8     11-13  Mg     2.4     11-13

## 2023-11-13 NOTE — SWALLOW FEES ASSESSMENT ADULT - DIAGNOSTIC IMPRESSIONS
Pt p/w oropharyngeal dysphagia; Laryngeal Penetration on purees, inconsistently retrieved on repeat swallow; Silent laryngeal Penetration & Aspiration on Moderately thick liquids. Limited PO trials, as Pt pulled out scope after Puree & Moderately thick liquid trials. Pt is not a candidate for PO meals at this time.

## 2023-11-13 NOTE — PROGRESS NOTE ADULT - PROBLEM SELECTOR PLAN 3
RVP + coronavirus (not COVID 19)  Chest Xray; no consolidation  received 2.4L ns bolus  f/u blood cultures, urine cultures  ua negative  po guaifenesin 600 q12h  tylenol prn  Supportive care RVP + coronavirus (not COVID 19)  Chest Xray; no consolidation  received 2.4L ns bolus  f/u blood cultures, urine cultures  ua negative  po guaifenesin 600 q12h d2/3  tylenol prn  Pulm rec:   - maintain strict aspirations at all times, keep head of bed elevated ~45 deg  - agree w/ SLP eval and NPO for now  Supportive care

## 2023-11-13 NOTE — SWALLOW FEES ASSESSMENT ADULT - SLP PERTINENT HISTORY OF CURRENT PROBLEM
83 y.o. M with PMHx of HTN, [place], dementia, presenting with generalized weakness and increasing shortness of breath. Admitted for sepsis 2/2 AHRF. Found to have B/L inflamed conjunctivae - likely viral with secondary bacterial infection. RVP was checked and positive for coronavirus (not COVID-19). CXR negative for infiltrate.

## 2023-11-13 NOTE — SWALLOW FEES ASSESSMENT ADULT - PHARYNGEAL PHASE COMMENTS
Green-tinted Moderately thick liquids trials given. Poor pharyngeal contractility, hyo-laryngeal excursion, and cricopharyngeal relaxation were evident. There was maximal retention in the dejan-pharynx and hypopharynx. Silent laryngeal penetration noted along the laryngeal surface of the epiglottis. Tinted residue + secretions were visualized eventually reaching the level of the ventricular folds. The patient was unable to clear despite multiple swallows. Tinted material was subsequently aspirated on inhale. Limited PO trials, as Pt pulled out scope after Moderately thick liquid trials. Exam terminated. Poor pharyngeal contractility, hyo-laryngeal excursion, and cricopharyngeal relaxation were evident. There was maximal retention in the dejan-pharynx and hypopharynx. Silent laryngeal penetration noted along the laryngeal surface of the epiglottis. Tinted residue + secretions were visualized eventually reaching the level of the ventricular folds. The patient was unable to clear despite multiple swallows.

## 2023-11-14 NOTE — CHART NOTE - NSCHARTNOTEFT_GEN_A_CORE
Resident informed by overnight nurse that pt had BP of 173/80. Current medications were reviewed. It was noted pt was admitted for stroke work up but acute CVA was ruled out with imaging. A one time 5mg dose of amlodipine was ordered. Pt will continue to be monitored. Will discuss with primary team to discuss possibly increasing standing BP meds. Resident informed by overnight nurse that pt had BP of 173/80. Current medications were reviewed. It was noted pt was admitted for stroke work up but acute CVA was ruled out with imaging. A one time 5mg dose of amlodipine was ordered. Pt will continue to be monitored. Will discuss with primary team to discuss possibly increasing standing BP meds.    Update: Pt refused to open his mouth, so did not get amlodpine.

## 2023-11-14 NOTE — CONSULT NOTE ADULT - LOCATION OF DISCUSSION
CAPRINI VTE 2.0 SCORE [CLOT updated 2019]    AGE RELATED RISK FACTORS                                                       MOBILITY RELATED FACTORS  [ ] Age 41-60 years                                            (1 Point)                    [ ] Bed rest                                                        (1 Point)  [ ] Age: 61-74 years                                           (2 Points)                  [ ] Plaster cast                                                   (2 Points)  [ ] Age= 75 years                                              (3 Points)                    [ ] Bed bound for more than 72 hours                 (2 Points)    DISEASE RELATED RISK FACTORS                                               GENDER SPECIFIC FACTORS  [ ] Edema in the lower extremities                       (1 Point)              [ ] Pregnancy                                                     (1 Point)  [ ] Varicose veins                                               (1 Point)                     [ ] Post-partum < 6 weeks                                   (1 Point)             [ ] BMI > 25 Kg/m2                                            (1 Point)                     [ ] Hormonal therapy  or oral contraception          (1 Point)                 [ ] Sepsis (in the previous month)                        (1 Point)               [ ] History of pregnancy complications                 (1 point)  [ ] Pneumonia or serious lung disease                                               [ ] Unexplained or recurrent                     (1 Point)           (in the previous month)                               (1 Point)  [ ] Abnormal pulmonary function test                     (1 Point)                 SURGERY RELATED RISK FACTORS  [ ] Acute myocardial infarction                              (1 Point)               [ ]  Section                                             (1 Point)  [ ] Congestive heart failure (in the previous month)  (1 Point)      [ ] Minor surgery                                                  (1 Point)   [ ] Inflammatory bowel disease                             (1 Point)               [ ] Arthroscopic surgery                                        (2 Points)  [ ] Central venous access                                      (2 Points)                [ ] General surgery lasting more than 45 minutes (2 points)  [ ] Malignancy- Present or previous                   (2 Points)                [ ] Elective arthroplasty                                         (5 points)    [ ] Stroke (in the previous month)                          (5 Points)                                                                                                                                                           HEMATOLOGY RELATED FACTORS                                                 TRAUMA RELATED RISK FACTORS  [ ] Prior episodes of VTE                                     (3 Points)                [ ] Fracture of the hip, pelvis, or leg                       (5 Points)  [ ] Positive family history for VTE                         (3 Points)             [ ] Acute spinal cord injury (in the previous month)  (5 Points)  [ ] Prothrombin 40618 A                                     (3 Points)               [ ] Paralysis  (less than 1 month)                             (5 Points)  [ ] Factor V Leiden                                             (3 Points)                  [ ] Multiple Trauma within 1 month                        (5 Points)  [ ] Lupus anticoagulants                                     (3 Points)                                                           [ ] Anticardiolipin antibodies                               (3 Points)                                                       [ ] High homocysteine in the blood                      (3 Points)                                             [ ] Other congenital or acquired thrombophilia      (3 Points)                                                [ ] Heparin induced thrombocytopenia                  (3 Points)                                     Total Score [          ]      OPIOID RISK TOOL    HERMINIO EACH BOX THAT APPLIES AND ADD TOTALS AT THE END    FAMILY HISTORY OF SUBSTANCE ABUSE                 FEMALE         MALE                                                Alcohol                            [    ] 1 pt         [     ] 3pts                                               Illegal Drugs                    [    ] 2 pts       [     ] 3pts                                               Rx Drugs                          [      ]4 pts      [     ] 4 pts    PERSONAL HISTORY OF SUBSTANCE ABUSE                                                                                          Alcohol                            [     ] 3 pts       [     ] 3 pts                                               Illegal Drugs                    [     ] 4 pts       [     ] 4 pts                                               Rx Drugs                          [     ] 5 pts       [     ] 5 pts    AGE BETWEEN 16-45 YEARS                                     [     ] 1 pt         [     ] 1 pt    HISTORY OF PREADOLESCENT   SEXUAL ABUSE                                                            [     ] 3 pts        [     ] 0pts    PSYCHOLOGICAL DISEASE                     ADD, OCD, Bipolar, Schizophrenia        [     ] 2 pts        [     ] 2 pts                      Depression                                               [     ] 1 pt          [     ] 1 pt           SCORING TOTAL   (add numbers and type here)              (      )                                     A score of 3 or lower indicated LOW risk for future opioid abuse  A score of 4 to 7 indicated moderate risk for future opioid abuse  A score of 8 or higher indicates a high risk for opioid abuse Telephone TASHI VTE 2.0 SCORE [CLOT updated 2019]    AGE RELATED RISK FACTORS                                                       MOBILITY RELATED FACTORS  [ ] Age 41-60 years                                            (1 Point)                    [ ] Bed rest                                                        (1 Point)  [ ] Age: 61-74 years                                           (2 Points)                  [ ] Plaster cast                                                   (2 Points)  [x ] Age= 75 years                                              (3 Points)                    [ ] Bed bound for more than 72 hours                 (2 Points)    DISEASE RELATED RISK FACTORS                                               GENDER SPECIFIC FACTORS  [ ] Edema in the lower extremities                       (1 Point)              [ ] Pregnancy                                                     (1 Point)  [ ] Varicose veins                                               (1 Point)                     [ ] Post-partum < 6 weeks                                   (1 Point)             [ ] BMI > 25 Kg/m2                                            (1 Point)                     [ ] Hormonal therapy  or oral contraception          (1 Point)                 [ ] Sepsis (in the previous month)                        (1 Point)               [ ] History of pregnancy complications                 (1 point)  [ ] Pneumonia or serious lung disease                                               [ ] Unexplained or recurrent                     (1 Point)           (in the previous month)                               (1 Point)  [ ] Abnormal pulmonary function test                     (1 Point)                 SURGERY RELATED RISK FACTORS  [ ] Acute myocardial infarction                              (1 Point)               [ ]  Section                                             (1 Point)  [ ] Congestive heart failure (in the previous month)  (1 Point)      [ ] Minor surgery                                                  (1 Point)   [ ] Inflammatory bowel disease                             (1 Point)               [ ] Arthroscopic surgery                                        (2 Points)  [ ] Central venous access                                      (2 Points)                [ ] General surgery lasting more than 45 minutes (2 points)  [ ] Malignancy- Present or previous                   (2 Points)                [ ] Elective arthroplasty                                         (5 points)    [ ] Stroke (in the previous month)                          (5 Points)                                                                                                                                                           HEMATOLOGY RELATED FACTORS                                                 TRAUMA RELATED RISK FACTORS  [ ] Prior episodes of VTE                                     (3 Points)                [ ] Fracture of the hip, pelvis, or leg                       (5 Points)  [ ] Positive family history for VTE                         (3 Points)             [ ] Acute spinal cord injury (in the previous month)  (5 Points)  [ ] Prothrombin 81611 A                                     (3 Points)               [ ] Paralysis  (less than 1 month)                             (5 Points)  [ ] Factor V Leiden                                             (3 Points)                  [ ] Multiple Trauma within 1 month                        (5 Points)  [ ] Lupus anticoagulants                                     (3 Points)                                                           [ ] Anticardiolipin antibodies                               (3 Points)                                                       [ ] High homocysteine in the blood                      (3 Points)                                             [ ] Other congenital or acquired thrombophilia      (3 Points)                                                [ ] Heparin induced thrombocytopenia                  (3 Points)                                     Total Score [          ]      OPIOID RISK TOOL    HERMINIO EACH BOX THAT APPLIES AND ADD TOTALS AT THE END    FAMILY HISTORY OF SUBSTANCE ABUSE                 FEMALE         MALE                                                Alcohol                            [    ] 1 pt         [     ] 3pts                                               Illegal Drugs                    [    ] 2 pts       [     ] 3pts                                               Rx Drugs                          [      ]4 pts      [     ] 4 pts    PERSONAL HISTORY OF SUBSTANCE ABUSE                                                                                          Alcohol                            [     ] 3 pts       [     ] 3 pts                                               Illegal Drugs                    [     ] 4 pts       [     ] 4 pts                                               Rx Drugs                          [     ] 5 pts       [     ] 5 pts    AGE BETWEEN 16-45 YEARS                                     [     ] 1 pt         [     ] 1 pt    HISTORY OF PREADOLESCENT   SEXUAL ABUSE                                                            [     ] 3 pts        [     ] 0pts    PSYCHOLOGICAL DISEASE                     ADD, OCD, Bipolar, Schizophrenia        [     ] 2 pts        [     ] 2 pts                      Depression                                               [     ] 1 pt          [     ] 1 pt           SCORING TOTAL   (add numbers and type here)              (      )                                     A score of 3 or lower indicated LOW risk for future opioid abuse  A score of 4 to 7 indicated moderate risk for future opioid abuse  A score of 8 or higher indicates a high risk for opioid abuse CAPRINI VTE 2.0 SCORE [CLOT updated 2019]    AGE RELATED RISK FACTORS                                                       MOBILITY RELATED FACTORS  [ ] Age 41-60 years                                            (1 Point)                    [ ] Bed rest                                                        (1 Point)  [ ] Age: 61-74 years                                           (2 Points)                  [ ] Plaster cast                                                   (2 Points)  [x ] Age= 75 years                                              (3 Points)                    [ ] Bed bound for more than 72 hours                 (2 Points)    DISEASE RELATED RISK FACTORS                                               GENDER SPECIFIC FACTORS  [ ] Edema in the lower extremities                       (1 Point)              [ ] Pregnancy                                                     (1 Point)  [ ] Varicose veins                                               (1 Point)                     [ ] Post-partum < 6 weeks                                   (1 Point)             [ ] BMI > 25 Kg/m2                                            (1 Point)                     [ ] Hormonal therapy  or oral contraception          (1 Point)                 [ ] Sepsis (in the previous month)                        (1 Point)               [ ] History of pregnancy complications                 (1 point)  [ ] Pneumonia or serious lung disease                                               [ ] Unexplained or recurrent                     (1 Point)           (in the previous month)                               (1 Point)  [ ] Abnormal pulmonary function test                     (1 Point)                 SURGERY RELATED RISK FACTORS  [ ] Acute myocardial infarction                              (1 Point)               [ ]  Section                                             (1 Point)  [ ] Congestive heart failure (in the previous month)  (1 Point)      [ ] Minor surgery                                                  (1 Point)   [ ] Inflammatory bowel disease                             (1 Point)               [ ] Arthroscopic surgery                                        (2 Points)  [ ] Central venous access                                      (2 Points)                [ ] General surgery lasting more than 45 minutes (2 points)  [ ] Malignancy- Present or previous                   (2 Points)                [ x] Elective arthroplasty                                         (5 points)    [ ] Stroke (in the previous month)                          (5 Points)                                                                                                                                                           HEMATOLOGY RELATED FACTORS                                                 TRAUMA RELATED RISK FACTORS  [ ] Prior episodes of VTE                                     (3 Points)                [ ] Fracture of the hip, pelvis, or leg                       (5 Points)  [ ] Positive family history for VTE                         (3 Points)             [ ] Acute spinal cord injury (in the previous month)  (5 Points)  [ ] Prothrombin 18611 A                                     (3 Points)               [ ] Paralysis  (less than 1 month)                             (5 Points)  [ ] Factor V Leiden                                             (3 Points)                  [ ] Multiple Trauma within 1 month                        (5 Points)  [ ] Lupus anticoagulants                                     (3 Points)                                                           [ ] Anticardiolipin antibodies                               (3 Points)                                                       [ ] High homocysteine in the blood                      (3 Points)                                             [ ] Other congenital or acquired thrombophilia      (3 Points)                                                [ ] Heparin induced thrombocytopenia                  (3 Points)                                     Total Score [      8   ]      OPIOID RISK TOOL    HERMINIO EACH BOX THAT APPLIES AND ADD TOTALS AT THE END    FAMILY HISTORY OF SUBSTANCE ABUSE                 FEMALE         MALE                                                Alcohol                            [    ] 1 pt         [     ] 3pts                                               Illegal Drugs                    [    ] 2 pts       [     ] 3pts                                               Rx Drugs                          [      ]4 pts      [     ] 4 pts    PERSONAL HISTORY OF SUBSTANCE ABUSE                                                                                          Alcohol                            [     ] 3 pts       [     ] 3 pts                                               Illegal Drugs                    [     ] 4 pts       [     ] 4 pts                                               Rx Drugs                          [     ] 5 pts       [     ] 5 pts    AGE BETWEEN 16-45 YEARS                                     [     ] 1 pt         [     ] 1 pt    HISTORY OF PREADOLESCENT   SEXUAL ABUSE                                                            [     ] 3 pts        [     ] 0pts    PSYCHOLOGICAL DISEASE                     ADD, OCD, Bipolar, Schizophrenia        [     ] 2 pts        [     ] 2 pts                      Depression                                               [     ] 1 pt          [     ] 1 pt           SCORING TOTAL   (add numbers and type here)              (  0    )                                     A score of 3 or lower indicated LOW risk for future opioid abuse  A score of 4 to 7 indicated moderate risk for future opioid abuse  A score of 8 or higher indicates a high risk for opioid abuse

## 2023-11-14 NOTE — PROGRESS NOTE ADULT - PROBLEM SELECTOR PLAN 2
p/w fever, HR >90, hypotension, lactate 1.9  patient is afebrile and normotensive does not meet SIRS criteria.  RVP + coronavirus (not COVID 19)  Had +upper airway sounds, improved after nasal suction  on 3L NC saturating at 99% weaned off oxygen today.   home med: trelegy 200/ 52.5/25 and albuterol 90mcg  c/w duonebs  start hypertonic saline 3% inhalation w/ duonebs  switch mucinex to robitussin  oropharyngeal suctioning TID  Chest PT

## 2023-11-14 NOTE — GOALS OF CARE CONVERSATION - ADVANCED CARE PLANNING - CONVERSATION DETAILS
Discussed patient's current health, NPO status and ongoing aspiration risk with patient's son Mr. Frankie Romero, he informed that patient has dementia and current mental status is at baseline .However, he was able to tolerate soft feeds and did not choke or cough on foods and it only happened since last Thursday. He also informed that patient's primary care giver was his mother; patient's wife who just passed away recently last Monday. Discussed MOLST- CPR/intubation with him. He further stated that he would not want aggressive measures for his father and asked to "let him go if it happens" He refused CPR/ intubation and opted for DNR/DNI.

## 2023-11-14 NOTE — PROGRESS NOTE ADULT - SUBJECTIVE AND OBJECTIVE BOX
PULMONARY SERVICE FOLLOW-UP NOTE    [Pt not fully participatory in interview despite  repeating many questions]. Denies prior pulmonary PMH and never seen pulmonologist. He quit smoking in 1996; only smoked 4-6 cigarettes a day. Feels he's been coughing a bit more over last month. Sometimes dry, other times brings up white phlegm -- not sure if it's saliva. Currently denies respiratory complaints. Per primary team, had a lot of mucus and sounded wet earlier in AM requiring deep suctioning.     REVIEW OF SYSTEMS (limited by patient participation in interview):  See above    MEDICATIONS:  MEDICATIONS  (STANDING):  acyclovir   Oral Tab/Cap 400 milliGRAM(s) Oral five times a day  albuterol/ipratropium for Nebulization 3 milliLiter(s) Nebulizer every 6 hours  allopurinol 300 milliGRAM(s) Oral daily  dextrose 5% + sodium chloride 0.9%. 1000 milliLiter(s) (70 mL/Hr) IV Continuous <Continuous>  donepezil 5 milliGRAM(s) Oral at bedtime  enoxaparin Injectable 40 milliGRAM(s) SubCutaneous every 24 hours  guaiFENesin  milliGRAM(s) Oral every 12 hours  lactated ringers. 1000 milliLiter(s) (75 mL/Hr) IV Continuous <Continuous>  losartan 50 milliGRAM(s) Oral daily  pantoprazole    Tablet 40 milliGRAM(s) Oral before breakfast  predniSONE   Tablet 40 milliGRAM(s) Oral daily  risperiDONE   Tablet 0.5 milliGRAM(s) Oral at bedtime  senna 2 Tablet(s) Oral at bedtime  sodium chloride 0.9%. 1000 milliLiter(s) (100 mL/Hr) IV Continuous <Continuous>  sodium chloride 3%  Inhalation 4 milliLiter(s) Inhalation every 6 hours  trimethoprim/polymyxin Solution 1 Drop(s) Both EYES every 6 hours    MEDICATIONS  (PRN):  acetaminophen     Tablet .. 650 milliGRAM(s) Oral every 6 hours PRN Temp greater or equal to 38C (100.4F), Mild Pain (1 - 3)  sodium chloride 0.65% Nasal 1 Spray(s) Both Nostrils two times a day PRN Nasal Congestion    Allergies    No Known Allergies    Intolerances    Vital Signs Last 24 Hrs  Vital Signs Last 24 Hrs  T(C): 37.4 (14 Nov 2023 16:12), Max: 37.4 (14 Nov 2023 16:12)  T(F): 99.3 (14 Nov 2023 16:12), Max: 99.3 (14 Nov 2023 16:12)  HR: 64 (14 Nov 2023 16:12) (60 - 76)  BP: 165/70 (14 Nov 2023 16:12) (149/69 - 185/72)  BP(mean): --  RR: 18 (14 Nov 2023 16:12) (17 - 18)  SpO2: 94% (14 Nov 2023 16:12) (94% - 98%)    Parameters below as of 14 Nov 2023 16:12  Patient On (Oxygen Delivery Method): room air      PHYSICAL EXAM:  Constitutional: non-toxic elderly man resting in bed; NAD  Head: atraumatic  EENT: conjunctival injection b/l w/ right > left eyelid fullness, right droop; oropharynx clear, MMM, ?lip swelling  Neck: supple, no appreciable JVD  Respiratory: lungs CTA with good aeration to both bases; respirations appear non-labored  Cardiovascular: +S1/S2, RRR  Gastrointestinal: soft, NT/ND  Extremities: WWP; no edema, clubbing or cyanosis   Vascular: 2+ radial pulses B/L  Neurological: awake, somewhat constricted affect; right facial droop (per primary team, finding has been noted already- full strength testing deferred, pt undergoing CVA eval since admission)    LABS:                          13.0   5.70  )-----------( 191      ( 14 Nov 2023 05:32 )             39.5   11-14    140  |  109<H>  |  16  ----------------------------<  108<H>  3.8   |  26  |  0.92    Ca    10.1      14 Nov 2023 05:32  Phos  2.9     11-14  Mg     2.2     11-14    TPro  7.1  /  Alb  2.5<L>  /  TBili  0.8  /  DBili  x   /  AST  45<H>  /  ALT  30  /  AlkPhos  64  11-14      RADIOLOGY & ADDITIONAL STUDIES (personally reviewed):  < from: Xray Chest 1 View- PORTABLE-Urgent (11.11.23 @ 01:39) >  IMPRESSION: Clear lungs.

## 2023-11-14 NOTE — CONSULT NOTE ADULT - PROBLEM SELECTOR RECOMMENDATION 6
Due to advanced dementia, patient essentially bedbound at baseline.  At significant risk of further debility and decline, including aspiration, recurrent infections, and skin failure/breakdown.    Recommend:  Offloading of heels   Turning and positioning Q 2 hours at tolerated

## 2023-11-14 NOTE — PROGRESS NOTE ADULT - PROBLEM SELECTOR PLAN 1
pt has a right sided facial droop with right eyelid droop.  family mentions noticing only the right eyelid droop on 11/10/23  difficulty swallowing liquids and solids  speech and swallow consulted-->failed FEES assessment  NPO   CT/MR head: No acute infarct, hemorrhage, or mass identified   Neuro (Dr. Humphries) consulted  possible bells palsy  start prednisone 60mg qd and valcyclovir 1g q8  Palliative consulted re possible PEG

## 2023-11-14 NOTE — CONSULT NOTE ADULT - ASSESSMENT
84yo man trivial distant smoking hx w/ PMH dementia presented with weakness, dyspnea and cough; found with +coronavirus (non-COVID) and also being worked up for CVA with presentation suggestive of viral bronchitis possibly complicated by aspiration/impaired airway clearance. Chest imaging without consolidation, and with increased mucus production possibly with impaired clearance, suggestive of bronchitis - unknown baseline and primary team also working up CVA.     #Viral bronchitis  #Coronavirus (non-COVID) infection  #Aspiration/mucus plugging    Recommendations:  - conservative mgmt for coronavirus infection  - would initiate airway clearance regimen as follows:  --> duoneb vs. albuterol neb q6h standing ATC [pt will not be able to actuate MDI]  --> sodium chloride 3% neb q6h with albuterol/duoneb   --> manual chest PT/physiotherapy  - suctioning PRN  - trial of guaifenesin 600mg ER q12h for ~3-4d, for mucolytic effect  - d/c Symbicort  - maintain strict aspirations at all times, keep head of bed elevated ~45 deg  - agree w/ SLP eval and NPO for now  - will follow
  Impression:  The patient has dementia and pw fever and cough neurology called for right facial weakness, patient not cooperative with exam and evaluation is limited but there is concern for CVA vs Bell's palsy.     Recommendations:  1.              MRI brain and if +luan for acute stroke then get MRA head without contrast, Carotid duplex (CD), TTE and tele x 24hours, check HbA1C and fasting lipid profile and Secondary stroke prevention: ASA 81mg (or ASA 325mg rectally if unable to take p) and Lipitor 40mg HS; Plavix x 3 weeks.  2.  If there is no acute CVA, then consider starting Prednisone and Acyclovir for Bell's Plasy  3.  Eval and treatment for fever and cough as per primary team    Thank you for the courtesy of this consult.    
83 y.o. M with PMHx of HTN and advanced dementia, with wife (primary caretaker) dying approx 1 week ago, [facility] with generalized weakness and shortness of breath. In ER found to have + RVP for non-COVID coronavirus.  Patient admitted for sepsis 2/2 AHRF with acute viral URI, and ambulatory dysfunction.  Also noted to have bilateral conjunctivitis.  Received IV Rocephin x 1 dose.  On day after admission (11/12), patient was noted to have a right sided facial droop but no other definitive neuro symptoms, ? possible CVA vs Bell's palsy.  Neurology consulted, CT and MRI of brain both negative for infarct, bleed, or acute intracranial disease (MRI limited by motion artifact), started on oral prednisone and antiviral tx with acyclovir (now being changed to Valtrex).  Hospital course further complicated by severe pulmonary congestion with copious secretions requiring frequent suctioning, and ? new finding of severe oropharyngeal dysphagia, currently remains NPO.

## 2023-11-14 NOTE — CONSULT NOTE ADULT - SUBJECTIVE AND OBJECTIVE BOX
Consult to: Discuss complex medical decision making related to goals of care    [facility] Geriatric and Palliative Consult Service:  Damarisrachael Brown DO: cell ([phone])  Larry Kenny MD: cell ([phone])  Nicholas Alvarado NP: cell ([phone])   Bryan Tirado LMSW: cell ([phone])   Agatha Guerin NP: via [employer] Teams    Can contact any Palliative Team member via [employer] Teams for consults and questions      HPI:  83 y.o. M with PMHx of HTN, dementia, presenting with generalized weakness and shortness of breath. Family at the bedside providing history, state that for the past 2 days pt has been feeling ill, has a cough, and has increased shortness of breath on exertion. Early yesterday the patient developed cough and congestion. They decided to bring him to the ED today after he was unable to walk on his own when he is usually able to walk with a cane. Family deny any pt complaints of any chest pain, palpitations, fever, chills, headache, visual changes, nausea, vomiting diarrhea. NKDA    In the ED   /79 , T 102.9, RR 22, 95% on ra   CXR- no acute infiltrates on wet read  Trop 1- 108  EKG NSR no acute ischemic changes on wet read   RVP +  UA - neg   ABG wnl   (11 Nov 2023 06:14)      PAST MEDICAL & SURGICAL HISTORY:  HTN (hypertension)      Dementia          SOCIAL HISTORY:    Admitted from:  home  (with HHA)           assisted living          G[facility]ELLE       LTC   [ none ] Substance abuse, [ none ] Tobacco hx, [ none ] Alcohol hx, [ none ] Home Opioid Hx    FAMILY HISTORY:   unable to obtain from patient due to poor mentation, family unable to give information, see H&P for history  Baseline ADLs (prior to admission):    Allergies    No Known Allergies    Intolerances      Present Symptoms: Mild, Moderate, Severe  Pain:             Location -                               Aggravating factors -             Quality -             Radiation -             Timing-             Severity (0-10 scale):             Minimal acceptable level (0-10 scale):  Fatigue:  Nausea:  Lack of Appetite:   SOB:  Depression:  Anxiety:  Review of Systems: [All others negative or Unable to obtain due to poor mentation]    CPOT:    https://www.Psychiatricm.org/getattachment/[email or web address]/Critical-Care-Pain-Observation-Tool-(CPOT)  PAIN AD Score:   [email or web address] (press ctrl +  left click to view)      MEDICATIONS  (STANDING):  albuterol/ipratropium for Nebulization 3 milliLiter(s) Nebulizer every 6 hours  allopurinol 300 milliGRAM(s) Oral daily  amLODIPine   Tablet 5 milliGRAM(s) Oral once  dextrose 5% + sodium chloride 0.9%. 1000 milliLiter(s) (70 mL/Hr) IV Continuous <Continuous>  donepezil 5 milliGRAM(s) Oral at bedtime  enoxaparin Injectable 40 milliGRAM(s) SubCutaneous every 24 hours  guaiFENesin  milliGRAM(s) Oral every 12 hours  losartan 50 milliGRAM(s) Oral daily  pantoprazole    Tablet 40 milliGRAM(s) Oral before breakfast  risperiDONE   Tablet 0.5 milliGRAM(s) Oral at bedtime  senna 2 Tablet(s) Oral at bedtime  sodium chloride 3%  Inhalation 4 milliLiter(s) Inhalation every 6 hours  trimethoprim/polymyxin Solution 1 Drop(s) Both EYES every 6 hours  valACYclovir 1000 milliGRAM(s) Oral every 8 hours    MEDICATIONS  (PRN):  acetaminophen     Tablet .. 650 milliGRAM(s) Oral every 6 hours PRN Temp greater or equal to 38C (100.4F), Mild Pain (1 - 3)  sodium chloride 0.65% Nasal 1 Spray(s) Both Nostrils two times a day PRN Nasal Congestion      PHYSICAL EXAM:  Vital Signs Last 24 Hrs  T(C): 36.9 (14 Nov 2023 20:30), Max: 37.4 (14 Nov 2023 16:12)  T(F): 98.4 (14 Nov 2023 20:30), Max: 99.3 (14 Nov 2023 16:12)  HR: 75 (14 Nov 2023 20:30) (60 - 76)  BP: 173/80 (14 Nov 2023 20:30) (150/81 - 185/72)  BP(mean): --  RR: 18 (14 Nov 2023 20:30) (17 - 18)  SpO2: 93% (14 Nov 2023 20:30) (93% - 98%)    Parameters below as of 14 Nov 2023 20:30  Patient On (Oxygen Delivery Method): room air        General: alert  oriented x ____    lethargic distressed cachexia  nonverbal  unarousable verbal    Palliative Performance Scale/Karnofsky Score:  http://[facility].org/files/news/palliative_performance_scale_ppsv2.pdf    HEENT: no abnormal lesion, dry mouth  ET tube/trach oral lesions:  Lungs: tachypnea/labored breathing, audible excessive secretions  CV: RRR, S1S2, tachycardia  GI: soft non distended non tender  incontinent               PEG/NG/OG tube  constipation  last BM:   : incontinent  oliguria/anuria  manzanares  Musculoskeletal: weakness x4 edema x4    ambulatory with assistance   mostly/fully bedbound/wheelchair bound  Skin: no abnormal skin lesions, poor skin turgor, pressure ulcer stage:   Neuro: no deficits, mild cognitive impairment dsyphagia/dysarthria paresis  Oral intake ability: unable/only mouth care, minimal moderate full capability    LABS:                        13.0   5.70  )-----------( 191      ( 14 Nov 2023 05:32 )             39.5     11-14    140  |  109<H>  |  16  ----------------------------<  108<H>  3.8   |  26  |  0.92    Ca    10.1      14 Nov 2023 05:32  Phos  2.9     11-14  Mg     2.2     11-14    TPro  7.1  /  Alb  2.5<L>  /  TBili  0.8  /  DBili  x   /  AST  45<H>  /  ALT  30  /  AlkPhos  64  11-14    Urinalysis Basic - ( 14 Nov 2023 05:32 )    Color: x / Appearance: x / SG: x / pH: x  Gluc: 108 mg/dL / Ketone: x  / Bili: x / Urobili: x   Blood: x / Protein: x / Nitrite: x   Leuk Esterase: x / RBC: x / WBC x   Sq Epi: x / Non Sq Epi: x / Bacteria: x        RADIOLOGY & ADDITIONAL STUDIES:     Consult to: Discuss complex medical decision making related to goals of care    [facility] Geriatric and Palliative Consult Service:  Damaris Stephanie DO: cell ([phone])  Larry Kenny MD: cell ([phone])  Nicholas Alvarado NP: cell ([phone])   Bryan Tirado SW: cell ([phone])   Agatha Guerin NP: via [employer] Teams    Can contact any Palliative Team member via [employer] Teams for consults and questions      HPI:  83 y.o. M with PMHx of HTN, dementia, presenting with generalized weakness and shortness of breath. Family at the bedside providing history, state that for the past 2 days pt has been feeling ill, has a cough, and has increased shortness of breath on exertion. Early yesterday the patient developed cough and congestion. They decided to bring him to the ED today after he was unable to walk on his own when he is usually able to walk with a cane. Family deny any pt complaints of any chest pain, palpitations, fever, chills, headache, visual changes, nausea, vomiting diarrhea. NKDA    In the ED   /79 , T 102.9, RR 22, 95% on ra   CXR- no acute infiltrates on wet read  Trop 1- 108  EKG NSR no acute ischemic changes on wet read   RVP +  (for non-COVID coronavirus)  UA - neg   ABG wnl   (2023 06:14)    Patient originally admitted for sepsis 2/2 AHRF with acute viral URI, and ambulatory dysfunction.  Also noted to have bilateral conjunctivitis.  Received IV Rocephin x 1 dose.  On day after admission (), patient was noted to have a right sided facial droop but no other definitive neuro symptoms, ? possible CVA vs Bell's palsy.  Neurology consulted, CT and MRI of brain both negative for infarct, bleed, or acute intracranial disease (MRI limited by motion artifact), started on oral prednisone and antiviral tx with acyclovir (now being changed to Valtrex).  Hospital course further complicated by severe pulmonary congestion with copious secretions requiring frequent suctioning, and ? new finding of severe oropharyngeal dysphagia with silent laryngeal penetration and aspiration on multiple consistencies--SLP notes and FEES eval noted, patient remains NPO except for meds, with ice chips for QOL only.  Palliative Care consult requested for medical decision making and complex GOC discussion, including discussion regarding possible long-term alternative/enteral feeding.    Patient examined at bedside this afternoon.  Attempted to obtain history with assistance of Cantonese speaking  via video interpretation service (ID# [id number]), but patient A & O x zero, lethargic, minimally verbally responsive, responses to questions mostly unintelligible (but was able to deny pain at one point).  Able to follow limited commands.  ++ audibly congested with frequent cough.  No other acute complaints reported by bedside nursing staff.      PAST MEDICAL & SURGICAL HISTORY:  HTN (hypertension)      Dementia          SOCIAL HISTORY:    Admitted from:  home  (wife was primary caretaker,  on --see below), no HHA, son recently took patient to live with him in [place]   [ none ] Substance abuse, [ none ] Tobacco hx, [ none ] Alcohol hx, [ none ] Home Opioid Hx    FAMILY HISTORY:  Unable to obtain from patient due to poor mentation, no family available  to give information, see H&P for history  Baseline ADLs (prior to admission):  Per son, patient was minimally ambulatory with extensive 1 person assist, mostly bedbound, incontinent, total care in ADLs, mostly speaks gibberish at home.      Allergies    No Known Allergies    Intolerances      Present Symptoms: Mild, Moderate, Severe  Pain:  Audibly congested, unable to verbalize    Review of Systems:  Unable to obtain due to poor mentation/advanced dementia      PAIN AD Score:  1  [email or web address] (press ctrl +  left click to view)      MEDICATIONS  (STANDING):  albuterol/ipratropium for Nebulization 3 milliLiter(s) Nebulizer every 6 hours  allopurinol 300 milliGRAM(s) Oral daily  amLODIPine   Tablet 5 milliGRAM(s) Oral once  dextrose 5% + sodium chloride 0.9%. 1000 milliLiter(s) (70 mL/Hr) IV Continuous <Continuous>  donepezil 5 milliGRAM(s) Oral at bedtime  enoxaparin Injectable 40 milliGRAM(s) SubCutaneous every 24 hours  guaiFENesin  milliGRAM(s) Oral every 12 hours  losartan 50 milliGRAM(s) Oral daily  pantoprazole    Tablet 40 milliGRAM(s) Oral before breakfast  risperiDONE   Tablet 0.5 milliGRAM(s) Oral at bedtime  senna 2 Tablet(s) Oral at bedtime  sodium chloride 3%  Inhalation 4 milliLiter(s) Inhalation every 6 hours  trimethoprim/polymyxin Solution 1 Drop(s) Both EYES every 6 hours  valACYclovir 1000 milliGRAM(s) Oral every 8 hours    MEDICATIONS  (PRN):  acetaminophen     Tablet .. 650 milliGRAM(s) Oral every 6 hours PRN Temp greater or equal to 38C (100.4F), Mild Pain (1 - 3)  sodium chloride 0.65% Nasal 1 Spray(s) Both Nostrils two times a day PRN Nasal Congestion      PHYSICAL EXAM:  Vital Signs Last 24 Hrs  T(C): 36.9 (2023 20:30), Max: 37.4 (2023 16:12)  T(F): 98.4 (2023 20:30), Max: 99.3 (2023 16:12)  HR: 75 (2023 20:30) (60 - 76)  BP: 173/80 (2023 20:30) (150/81 - 185/72)  BP(mean): --  RR: 18 (2023 20:30) (17 - 18)  SpO2: 93% (2023 20:30) (93% - 98%)    Parameters below as of 2023 20:30  Patient On (Oxygen Delivery Method): room air        General: alert  oriented x __0__    lethargic, scant temporal wasting, NAD    Palliative Performance Scale/Karnofsky Score:  20%  http://npcrc.org/files/news/palliative_performance_scale_ppsv2.pdf    HEENT: EOMI, anicteric, + conjunctival injection OU, pharynx clear  Lungs:  mild resting tachypnea, ++ transmitted upper airway congestion and rhonchi, othw clear to auscultation  CV:  RSR, normal S1 and S2, no murmur  GI: soft non distended non tender + normal bowel sounds  : incontinent    Musculoskeletal: weakness x4  in all extremities, essentially bedbound, no edema noted  Skin: no abnormal skin lesions or DU noted  Neuro:  minimal R facial droop appreciated, + severe cognitive impairment, + dysphagia  Oral intake ability: unable/mouth care only, currently NPO      LABS:                        13.0   5.70  )-----------( 191      ( 2023 05:32 )             39.5     11-14    140  |  109<H>  |  16  ----------------------------<  108<H>  3.8   |  26  |  0.92    Ca    10.1      2023 05:32  Phos  2.9     -  Mg     2.2         TPro  7.1  /  Alb  2.5<L>  /  TBili  0.8  /  DBili  x   /  AST  45<H>  /  ALT  30  /  AlkPhos  64  11-14    Urinalysis Basic - ( 2023 05:32 )    Color: x / Appearance: x / SG: x / pH: x  Gluc: 108 mg/dL / Ketone: x  / Bili: x / Urobili: x   Blood: x / Protein: x / Nitrite: x   Leuk Esterase: x / RBC: x / WBC x   Sq Epi: x / Non Sq Epi: x / Bacteria: x        RADIOLOGY & ADDITIONAL STUDIES:     Consult to: Discuss complex medical decision making related to goals of care    [facility] Geriatric and Palliative Consult Service:  Damaris Stephanie DO: cell ([phone])  Larry Kenny MD: cell ([phone])  Nicholas Rdz NP: cell ([phone])   Bryan Tirado SW: cell ([phone])   Agatha Guerin NP: via [employer] Teams    Can contact any Palliative Team member via [employer] Teams for consults and questions      HPI:  83 y.o. M with PMHx of HTN, dementia, presenting with generalized weakness and shortness of breath. Family at the bedside providing history, state that for the past 2 days pt has been feeling ill, has a cough, and has increased shortness of breath on exertion. Early yesterday the patient developed cough and congestion. They decided to bring him to the ED today after he was unable to walk on his own when he is usually able to walk with a cane. Family deny any pt complaints of any chest pain, palpitations, fever, chills, headache, visual changes, nausea, vomiting diarrhea. NKDA    In the ED   /79 , T 102.9, RR 22, 95% on ra   CXR- no acute infiltrates on wet read  Trop 1- 108  EKG NSR no acute ischemic changes on wet read   RVP +  (for non-COVID coronavirus)  UA - neg   ABG wnl   (2023 06:14)    Patient originally admitted for sepsis 2/2 AHRF with acute viral URI, and ambulatory dysfunction.  Also noted to have bilateral conjunctivitis.  Received IV Rocephin x 1 dose.  On day after admission (), patient was noted to have a right sided facial droop but no other definitive neuro symptoms, ? possible CVA vs Bell's palsy.  Neurology consulted, CT and MRI of brain both negative for infarct, bleed, or acute intracranial disease (MRI limited by motion artifact), started on oral prednisone and antiviral tx with acyclovir (now being changed to Valtrex).  Hospital course further complicated by severe pulmonary congestion with copious secretions requiring frequent suctioning, and ? new finding of severe oropharyngeal dysphagia with silent laryngeal penetration and aspiration on multiple consistencies--SLP notes and FEES eval noted, patient remains NPO except for meds, with ice chips for QOL only.  Palliative Care consult requested for medical decision making and complex GOC discussion, including discussion regarding possible long-term alternative/enteral feeding.    Patient examined at bedside this afternoon.  Attempted to obtain history with assistance of Cantonese speaking  via video interpretation service (ID# [id number]), but patient A & O x zero, lethargic, minimally verbally responsive, responses to questions mostly unintelligible (but was able to deny pain at one point).  Able to follow limited commands.  ++ audibly congested with frequent cough.  No other acute complaints reported by bedside nursing staff.      PAST MEDICAL & SURGICAL HISTORY:  HTN (hypertension)      Dementia          SOCIAL HISTORY:    Admitted from:  home  (wife was primary caretaker,  on --see below), no HHA, son recently took patient to live with him in [place]   [ none ] Substance abuse, [ none ] Tobacco hx, [ none ] Alcohol hx, [ none ] Home Opioid Hx    FAMILY HISTORY:  Unable to obtain from patient due to poor mentation, no family available  to give information, see H&P for history  Baseline ADLs (prior to admission):  Per son, patient was minimally ambulatory with extensive 1 person assist, mostly bedbound, incontinent, total care in ADLs, mostly speaks gibberish at home.      Allergies    No Known Allergies    Intolerances      Present Symptoms: Mild, Moderate, Severe  Pain:  Audibly congested, unable to verbalize    Review of Systems:  Unable to obtain due to poor mentation/advanced dementia      PAIN AD Score:  1  [email or web address] (press ctrl +  left click to view)      MEDICATIONS  (STANDING):  albuterol/ipratropium for Nebulization 3 milliLiter(s) Nebulizer every 6 hours  allopurinol 300 milliGRAM(s) Oral daily  amLODIPine   Tablet 5 milliGRAM(s) Oral once  dextrose 5% + sodium chloride 0.9%. 1000 milliLiter(s) (70 mL/Hr) IV Continuous <Continuous>  donepezil 5 milliGRAM(s) Oral at bedtime  enoxaparin Injectable 40 milliGRAM(s) SubCutaneous every 24 hours  guaiFENesin  milliGRAM(s) Oral every 12 hours  losartan 50 milliGRAM(s) Oral daily  pantoprazole    Tablet 40 milliGRAM(s) Oral before breakfast  risperiDONE   Tablet 0.5 milliGRAM(s) Oral at bedtime  senna 2 Tablet(s) Oral at bedtime  sodium chloride 3%  Inhalation 4 milliLiter(s) Inhalation every 6 hours  trimethoprim/polymyxin Solution 1 Drop(s) Both EYES every 6 hours  valACYclovir 1000 milliGRAM(s) Oral every 8 hours    MEDICATIONS  (PRN):  acetaminophen     Tablet .. 650 milliGRAM(s) Oral every 6 hours PRN Temp greater or equal to 38C (100.4F), Mild Pain (1 - 3)  sodium chloride 0.65% Nasal 1 Spray(s) Both Nostrils two times a day PRN Nasal Congestion      PHYSICAL EXAM:  Vital Signs Last 24 Hrs  T(C): 36.9 (2023 20:30), Max: 37.4 (2023 16:12)  T(F): 98.4 (2023 20:30), Max: 99.3 (2023 16:12)  HR: 75 (2023 20:30) (60 - 76)  BP: 173/80 (2023 20:30) (150/81 - 185/72)  BP(mean): --  RR: 18 (2023 20:30) (17 - 18)  SpO2: 93% (2023 20:30) (93% - 98%)    Parameters below as of 2023 20:30  Patient On (Oxygen Delivery Method): room air        General: alert  oriented x __0__    lethargic, scant temporal wasting, NAD    Palliative Performance Scale/Karnofsky Score:  20%  http://npcrc.org/files/news/palliative_performance_scale_ppsv2.pdf    HEENT: EOMI, anicteric, + conjunctival injection OU, pharynx clear  Lungs:  mild resting tachypnea, ++ transmitted upper airway congestion and rhonchi, othw clear to auscultation  CV:  RSR, normal S1 and S2, no murmur  GI: soft non distended non tender + normal bowel sounds  : incontinent    Musculoskeletal: weakness x4  in all extremities, essentially bedbound, no edema noted  Skin: no abnormal skin lesions or DU noted  Neuro:  minimal R facial droop appreciated, + severe cognitive impairment, + dysphagia  Oral intake ability: unable/mouth care only, currently NPO      LABS:                        13.0   5.70  )-----------( 191      ( 2023 05:32 )             39.5     -    140  |  109<H>  |  16  ----------------------------<  108<H>  3.8   |  26  |  0.92    Ca    10.1      2023 05:32  Phos  2.9       Mg     2.2         TPro  7.1  /  Alb  2.5<L>  /  TBili  0.8  /  DBili  x   /  AST  45<H>  /  ALT  30  /  AlkPhos  64      Urinalysis Basic - ( 2023 05:32 )    Color: x / Appearance: x / SG: x / pH: x  Gluc: 108 mg/dL / Ketone: x  / Bili: x / Urobili: x   Blood: x / Protein: x / Nitrite: x   Leuk Esterase: x / RBC: x / WBC x   Sq Epi: x / Non Sq Epi: x / Bacteria: x        RADIOLOGY & ADDITIONAL STUDIES:    ACC: [id number] EXAM:  XR CHEST PORTABLE URGENT 1V   ORDERED BY: RAYMOND RDZ     PROCEDURE DATE:  2023          INTERPRETATION:  AP chest on 2023 at 1:08 AM. Patient has   sepsis.    COMPARISON: None available.    Somewhat elevated left hemidiaphragm noted.    Heart magnified by technique.    Lungs are clear.    IMPRESSION: Clear lungs.    --- End of Report ---    ACC: [id number] EXAM:  CT BRAIN   ORDERED BY: RACHEL ANDRE     PROCEDURE DATE:  2023          INTERPRETATION:  CLINICAL INFORMATION: Right eyelid droop.    COMPARISON STUDY: None    TECHNIQUE:  Noncontrast axial CT images were acquired through the head.  Sagittal and coronal reformats were performed.    FINDINGS:  The examination was performed twice due to head motion on the initial   scan.    There is no CT evidence of acute intracranial hemorrhage, mass effect or   midline shift.  There is no acute loss of gray matter-white matter   differentiation.    There is mild generalized cerebral volume loss and mild periventricular   white matter hypoattenuation compatible with chronic microvascular   ischemic disease.  Incidentally noted is a cavum septum pellucidum et   vergae, a normal variant.    There is an approximately 1.7 cm mucous retention cyst versus polyp in   the right sphenoid sinus (3:16).  The remainder of the paranasal sinuses   are clear.    The mastoids are clear bilaterally.    The calvarium, skull base and orbits appear unremarkable.    IMPRESSION:  No CT evidence of acute intracranial pathology.    --- End of Report ---        ACC: [id number] EXAM:  MR BRAIN WAW IC   ORDERED BY: RACHEL ANDRE     PROCEDURE DATE:  2023          INTERPRETATION:  CLINICAL INDICATION: Rule out stroke. Right facial droop.    TECHNIQUE: Multi-planar multi-sequential MR imaging of the brain was   performed before and after the intravenous administration of contrast.   Gadavist IV contrast dose: .    COMPARISON: CT head 2023.    FINDINGS:  Motion degraded examination. Within these limits:    Please note that sequences labeled "post contrast" demonstrate no   presence of contrast material. Within these limits:    No acute infarct or intracranial hemorrhage.  Scattered T2/FLAIR hyperintense white matter foci, most compatible with   chronic small vessel changes.    No hydrocephalus. No extra-axial fluid collections. The skull base flow   voids are present.    The visualized intraorbital contents are normal. Uterus retention cyst   versus polyp in the right sphenoid sinus. The mastoid air cells are   clear. The visualized osseous structures, soft tissues and partially   visualized parotid glands appear normal.    IMPRESSION:    Please note that sequences labeled "post contrast" demonstrate no   contrast material. No documentation from technologist was provided as to   reason for failed injection. Postcontrast imaging can be reattempted if   clinically warranted. This was discussed with Dr. Atkins at 9:05 AM   2023.    No acute infarct, hemorrhage, or mass identified.    --- End of Report ---        Patient name: KALA VO  YOB: 1940   Age: 83 (M)   MR#: [id number]  Study Date: 2023  PROCEDURE: Transthoracic echocardiogram with 2-D, M-Mode  and complete spectral and color flow Doppler.  INDICATION: Chest pain, unspecified (R07.9)    ------------------------------------------------------------------------  CONCLUSIONS:  1. Normal mitral valve. Mild mitral regurgitation.  2. Calcified trileaflet aortic valve with normal opening.  No aortic stenosis. Mild aortic regurgitation.  3. Aortic Root: 3.8 cm. Ascending Aorta: 4 cm.  Borderline dilated aortic root; dilated ascending aorta.  Consider CT chest for further evaluation of aorta if  clinically indicated.  4. Mildly increased left ventricular wall thickness.  5. Normal Left Ventricular Systolic Function,  (EF = 64% by  biplane) No regional wall motion abnormalities.  6. The diastolic function is indeterminate based on current  diagnostic criteria.  7. Normal right ventricular size and systolic function  (TAPSE 3.0 cm).  8. RA Pressure is 3 mm Hg.  9. RV systolic pressure is mildly increased at  41 mm Hg.  10. Normal tricuspid valve. Mild tricuspid regurgitation;  eccentric jet severity may be underestimated.  11. Normal pulmonic valve. Mild pulmonic insufficiency is  noted.  12. Trivial pericardial effusion is seen.    *** No previous Echo exam.  ------------------------------------------------------------------------  Confirmed on  2023 - 16:03:26 by Roberta Segovia MD

## 2023-11-14 NOTE — PROGRESS NOTE ADULT - SUBJECTIVE AND OBJECTIVE BOX
PGY-1 Progress Note discussed with attending    PAGER #: [[phone]] TILL 5:00 PM  PLEASE CONTACT ON CALL TEAM:  - On Call Team (Please refer to Darion) FROM 5:00 PM - 8:30PM  - Nightfloat Team FROM 8:30 -7:30 AM    CC: Patient is a 83y old  Male who presents with a chief complaint of Sepsis (14 Nov 2023 17:43)      OVERNIGHT EVENTS:    SUBJECTIVE / INTERVAL HPI: Patient seen and examined at bedside with RNChing, assisting in translation. Patient denies any pain. Not responding to all questions appropriately limiting ROS. Not complaining of shortness of breath. Not oriented to place or time.       ROS: Unable to obtain due to mental status    VITAL SIGNS:  Vital Signs Last 24 Hrs  T(C): 37.4 (14 Nov 2023 16:12), Max: 37.4 (14 Nov 2023 16:12)  T(F): 99.3 (14 Nov 2023 16:12), Max: 99.3 (14 Nov 2023 16:12)  HR: 64 (14 Nov 2023 16:12) (60 - 76)  BP: 165/70 (14 Nov 2023 16:12) (149/69 - 185/72)  BP(mean): --  RR: 18 (14 Nov 2023 16:12) (17 - 18)  SpO2: 94% (14 Nov 2023 16:12) (94% - 98%)    Parameters below as of 14 Nov 2023 16:12  Patient On (Oxygen Delivery Method): room air        PHYSICAL EXAM:    General: WDWN, NAD  HEENT: NC/AT; PERRL, R eye ptosis, anicteric sclera; dry mucous membranes  Neck: supple  Cardiovascular: +S1/S2; RRR  Respiratory: inspiratory and expiratory rhonchi b/l; no Wheezing or rales  Gastrointestinal: soft, NT/ND; +BSx4  Extremities: WWP; no edema, clubbing or cyanosis  Vascular: 2+ radial, DP/PT pulses B/L  Skin: Warm, dry, good turgor, no rashes, or ecchymoses  Neurological: AAOx1; ptosis R eye, slight R lower facial droop, not able to participate in full neuro exam    MEDICATIONS:  MEDICATIONS  (STANDING):  acyclovir   Oral Tab/Cap 400 milliGRAM(s) Oral five times a day  albuterol/ipratropium for Nebulization 3 milliLiter(s) Nebulizer every 6 hours  allopurinol 300 milliGRAM(s) Oral daily  dextrose 5% + sodium chloride 0.9%. 1000 milliLiter(s) (70 mL/Hr) IV Continuous <Continuous>  donepezil 5 milliGRAM(s) Oral at bedtime  enoxaparin Injectable 40 milliGRAM(s) SubCutaneous every 24 hours  guaiFENesin  milliGRAM(s) Oral every 12 hours  lactated ringers. 1000 milliLiter(s) (75 mL/Hr) IV Continuous <Continuous>  losartan 50 milliGRAM(s) Oral daily  pantoprazole    Tablet 40 milliGRAM(s) Oral before breakfast  predniSONE   Tablet 40 milliGRAM(s) Oral daily  risperiDONE   Tablet 0.5 milliGRAM(s) Oral at bedtime  senna 2 Tablet(s) Oral at bedtime  sodium chloride 0.9%. 1000 milliLiter(s) (100 mL/Hr) IV Continuous <Continuous>  sodium chloride 3%  Inhalation 4 milliLiter(s) Inhalation every 6 hours  trimethoprim/polymyxin Solution 1 Drop(s) Both EYES every 6 hours    MEDICATIONS  (PRN):  acetaminophen     Tablet .. 650 milliGRAM(s) Oral every 6 hours PRN Temp greater or equal to 38C (100.4F), Mild Pain (1 - 3)  sodium chloride 0.65% Nasal 1 Spray(s) Both Nostrils two times a day PRN Nasal Congestion      ALLERGIES:  Allergies    No Known Allergies    Intolerances        LABS:                        13.0   5.70  )-----------( 191      ( 14 Nov 2023 05:32 )             39.5     11-14    140  |  109<H>  |  16  ----------------------------<  108<H>  3.8   |  26  |  0.92    Ca    10.1      14 Nov 2023 05:32  Phos  2.9     11-14  Mg     2.2     11-14    TPro  7.1  /  Alb  2.5<L>  /  TBili  0.8  /  DBili  x   /  AST  45<H>  /  ALT  30  /  AlkPhos  64  11-14      Urinalysis Basic - ( 14 Nov 2023 05:32 )    Color: x / Appearance: x / SG: x / pH: x  Gluc: 108 mg/dL / Ketone: x  / Bili: x / Urobili: x   Blood: x / Protein: x / Nitrite: x   Leuk Esterase: x / RBC: x / WBC x   Sq Epi: x / Non Sq Epi: x / Bacteria: x      CAPILLARY BLOOD GLUCOSE          RADIOLOGY & ADDITIONAL TESTS:  < from: MR Head w/wo IV Cont (11.13.23 @ 17:22) >  FINDINGS:  Motion degraded examination. Within these limits:    Please note that sequences labeled "post contrast" demonstrate no   presence of contrast material. Within these limits:    No acute infarct or intracranial hemorrhage.  Scattered T2/FLAIR hyperintense white matter foci, most compatible with   chronic small vessel changes.    No hydrocephalus. No extra-axial fluid collections. The skull base flow   voids are present.    The visualized intraorbital contents are normal. Uterus retention cyst   versus polyp in the right sphenoid sinus. The mastoid air cells are   clear. The visualized osseous structures, soft tissues and partially   visualized parotid glands appear normal.    IMPRESSION:    Please note that sequences labeled "post contrast" demonstrate no   contrast material. No documentation from technologist was provided as to   reason for failed injection. Postcontrast imaging can be reattempted if   clinically warranted. This was discussed with Dr. Atkins at 9:05 AM   11/14/2023.    No acute infarct, hemorrhage, or mass identified.    --- End of Report ---    < end of copied text >  < from: Xray Chest 1 View- PORTABLE-Urgent (11.11.23 @ 01:39) >  PROCEDURE DATE:  11/11/2023          INTERPRETATION:  AP chest on November 11, 2023 at 1:08 AM. Patient has   sepsis.    COMPARISON: None available.    Somewhat elevated left hemidiaphragm noted.    Heart magnified by technique.    Lungs are clear.    IMPRESSION: Clear lungs.    --- End of Report ---    < end of copied text >

## 2023-11-14 NOTE — PROGRESS NOTE ADULT - ASSESSMENT
84yo man trivial distant smoking hx w/ PMH dementia presented with weakness, dyspnea and cough; found with +coronavirus (non-COVID) and also being worked up for CVA with presentation suggestive of viral bronchitis possibly complicated by aspiration/impaired airway clearance. Chest imaging without consolidation, and with increased mucus production possibly with impaired clearance, suggestive of bronchitis - unknown baseline and primary team also working up CVA.     #Viral bronchitis  #Coronavirus (non-COVID) infection  #Aspiration/mucus plugging    Recommendations:  - conservative mgmt for coronavirus infection  - airway clearance regimen as follows:  --> duoneb  q6h standing   --> sodium chloride 3% neb q6h with albuterol/duoneb   --> manual chest PT/physiotherapy  - suctioning PRN  - trial of guaifenesin 600mg ER q12h for ~3-4d, for mucolytic effect  - d/c Symbicort  - maintain strict aspirations at all times, keep head of bed elevated ~45 deg  - FEES with severe oropharyngeal dysfunction, remains NPO

## 2023-11-14 NOTE — CONSULT NOTE ADULT - PROBLEM SELECTOR RECOMMENDATION 2
Due to severe viral URI/bronchitis and non-COVID coronavirus infection, with significant pulmonary congestion.  Pulmonary input appreciated, agreed with addition of saline nebs  Otherwise continue management as per primary medical team

## 2023-11-14 NOTE — PROGRESS NOTE ADULT - SUBJECTIVE AND OBJECTIVE BOX
****TEMPLATE ONLY***    Neurology Follow up note    Name  KALA VO    HPI:  83 y.o. M with PMHx of HTN, dementia, presenting with generalized weakness and shortness of breath. Family at the bedside providing history, state that for the past 2 days pt has been feeling ill, has a cough, and has increased shortness of breath on exertion. Early yesterday the patient developed cough and congestion. They decided to bring him to the ED today after he was unable to walk on his own when he is usually able to walk with a cane. Family deny any pt complaints of any chest pain, palpitations, fever, chills, headache, visual changes, nausea, vomiting diarrhea. NKDA    In the ED   /79 , T 102.9, RR 22, 95% on ra   CXR- no acute infiltrates on wet read  Trop 1- 108  EKG NSR no acute ischemic changes on wet read   RVP +  UA - neg   ABG wnl   (11 Nov 2023 06:14)      Interval History -        Subjective:    Review of Systems:  Constitutional:        Eyes, Ears, Mouth, Throat:   Respiratory:                            Cardiovascular:   Gastrointestinal:                                     Genitourinary:   Musculoskeletal:                                    Dermatologic:   Neurological: as per above                                                                 Psychiatric:   Endocrine:              Hematologic/Lymphatic:     MEDICATIONS  (STANDING):  acyclovir   Oral Tab/Cap 400 milliGRAM(s) Oral five times a day  albuterol/ipratropium for Nebulization 3 milliLiter(s) Nebulizer every 6 hours  allopurinol 300 milliGRAM(s) Oral daily  dextrose 5% + sodium chloride 0.9%. 1000 milliLiter(s) (70 mL/Hr) IV Continuous <Continuous>  donepezil 5 milliGRAM(s) Oral at bedtime  enoxaparin Injectable 40 milliGRAM(s) SubCutaneous every 24 hours  guaiFENesin  milliGRAM(s) Oral every 12 hours  lactated ringers. 1000 milliLiter(s) (75 mL/Hr) IV Continuous <Continuous>  losartan 50 milliGRAM(s) Oral daily  pantoprazole    Tablet 40 milliGRAM(s) Oral before breakfast  predniSONE   Tablet 40 milliGRAM(s) Oral daily  risperiDONE   Tablet 0.5 milliGRAM(s) Oral at bedtime  senna 2 Tablet(s) Oral at bedtime  sodium chloride 0.9%. 1000 milliLiter(s) (100 mL/Hr) IV Continuous <Continuous>  trimethoprim/polymyxin Solution 1 Drop(s) Both EYES every 6 hours    MEDICATIONS  (PRN):  acetaminophen     Tablet .. 650 milliGRAM(s) Oral every 6 hours PRN Temp greater or equal to 38C (100.4F), Mild Pain (1 - 3)  sodium chloride 0.65% Nasal 1 Spray(s) Both Nostrils two times a day PRN Nasal Congestion      Allergies    No Known Allergies    Intolerances        Objective:   Vital Signs Last 24 Hrs  T(C): 37 (14 Nov 2023 11:55), Max: 37.3 (13 Nov 2023 21:02)  T(F): 98.6 (14 Nov 2023 11:55), Max: 99.1 (13 Nov 2023 21:02)  HR: 76 (14 Nov 2023 11:55) (60 - 76)  BP: 156/83 (14 Nov 2023 11:55) (149/69 - 185/72)  BP(mean): --  RR: 18 (14 Nov 2023 11:55) (17 - 18)  SpO2: 94% (14 Nov 2023 11:55) (94% - 98%)    Parameters below as of 14 Nov 2023 11:55  Patient On (Oxygen Delivery Method): room air        General Exam:   General appearance: No acute distress                 Cardiovascular: Pedal dorsalis pulses intact bilaterally    Neurological Exam:  Mental Status: Orientated to self, date and place.  Attention intact.  No dysarthria, aphasia or neglect.  Knowledge intact.  Registration intact.  Short and long term memory grossly intact.      Cranial Nerves: CN I - not tested.  PERRL, EOMI, VFF, no nystagmus or diplopia.  No APD.  Fundi not visualized bilaterally.  CN V1-3 intact to light touch and pinprick.  No facial asymmetry.  Hearing intact to finger rub bilaterally.  Tongue, uvula and palate midline.  Sternocleidomastoid and Trapezius intact bilaterally.    Motor:   Tone: normal.                  Strength: intact throughout  Pronator drift: none                 Dysmeria: None to finger-nose-finger or heel-shin-heel  No truncal ataxia.    Tremor: No resting, postural or action tremor.  No myoclonus.    Sensation: intact to light touch, pinprick, vibration and proprioception    Deep Tendon Reflexes: 1+ bilateral biceps, triceps, brachioradialis, knee and ankle  Toes flexor bilaterally    Gait: normal and stable.      Other:    11-14    140  |  109<H>  |  16  ----------------------------<  108<H>  3.8   |  26  |  0.92    Ca    10.1      14 Nov 2023 05:32  Phos  2.9     11-14  Mg     2.2     11-14    TPro  7.1  /  Alb  2.5<L>  /  TBili  0.8  /  DBili  x   /  AST  45<H>  /  ALT  30  /  AlkPhos  64  11-14 11-14    140  |  109<H>  |  16  ----------------------------<  108<H>  3.8   |  26  |  0.92    Ca    10.1      14 Nov 2023 05:32  Phos  2.9     11-14  Mg     2.2     11-14    TPro  7.1  /  Alb  2.5<L>  /  TBili  0.8  /  DBili  x   /  AST  45<H>  /  ALT  30  /  AlkPhos  64  11-14    LIVER FUNCTIONS - ( 14 Nov 2023 05:32 )  Alb: 2.5 g/dL / Pro: 7.1 g/dL / ALK PHOS: 64 U/L / ALT: 30 U/L DA / AST: 45 U/L / GGT: x             Radiology    < from: MR Head w/wo IV Cont (11.13.23 @ 17:22) >  Please note that sequences labeled "post contrast" demonstrate no   contrast material. No documentation from technologist was provided as to   reason for failed injection. Postcontrast imaging can be reattempted if   clinically warranted. This was discussed with Dr. Atkins at 9:05 AM   11/14/2023.    No acute infarct, hemorrhage, or mass identified.    < end of copied text >                  Neurology Follow up note    Name  KALA VO    Subjective:  had mri brain    Review of Systems:  Constitutional:      no fever  Respiratory: no cough                             MEDICATIONS  (STANDING):  acyclovir   Oral Tab/Cap 400 milliGRAM(s) Oral five times a day  albuterol/ipratropium for Nebulization 3 milliLiter(s) Nebulizer every 6 hours  allopurinol 300 milliGRAM(s) Oral daily  dextrose 5% + sodium chloride 0.9%. 1000 milliLiter(s) (70 mL/Hr) IV Continuous <Continuous>  donepezil 5 milliGRAM(s) Oral at bedtime  enoxaparin Injectable 40 milliGRAM(s) SubCutaneous every 24 hours  guaiFENesin  milliGRAM(s) Oral every 12 hours  lactated ringers. 1000 milliLiter(s) (75 mL/Hr) IV Continuous <Continuous>  losartan 50 milliGRAM(s) Oral daily  pantoprazole    Tablet 40 milliGRAM(s) Oral before breakfast  predniSONE   Tablet 40 milliGRAM(s) Oral daily  risperiDONE   Tablet 0.5 milliGRAM(s) Oral at bedtime  senna 2 Tablet(s) Oral at bedtime  sodium chloride 0.9%. 1000 milliLiter(s) (100 mL/Hr) IV Continuous <Continuous>  trimethoprim/polymyxin Solution 1 Drop(s) Both EYES every 6 hours    MEDICATIONS  (PRN):  acetaminophen     Tablet .. 650 milliGRAM(s) Oral every 6 hours PRN Temp greater or equal to 38C (100.4F), Mild Pain (1 - 3)  sodium chloride 0.65% Nasal 1 Spray(s) Both Nostrils two times a day PRN Nasal Congestion      Allergies    No Known Allergies    Intolerances        Objective:   Vital Signs Last 24 Hrs  T(C): 37 (14 Nov 2023 11:55), Max: 37.3 (13 Nov 2023 21:02)  T(F): 98.6 (14 Nov 2023 11:55), Max: 99.1 (13 Nov 2023 21:02)  HR: 76 (14 Nov 2023 11:55) (60 - 76)  BP: 156/83 (14 Nov 2023 11:55) (149/69 - 185/72)  BP(mean): --  RR: 18 (14 Nov 2023 11:55) (17 - 18)  SpO2: 94% (14 Nov 2023 11:55) (94% - 98%)    Parameters below as of 14 Nov 2023 11:55  Patient On (Oxygen Delivery Method): room air        Mental Status:  Orientated to self,.  Lethargic.  No gross dysarthria, aphasia or neglect    Cranial Nerves: CN I - not tested.  PERRL, blinks to treat bl temporal areas.  CN V1-3 intact to light touch.  right facial weakness, with bl eyelid weakness worse on the right side.     Motor:   Tone: normal.                  Strength: decreased throughout  Patient does not corporate with dysmetria on finger-nose-finger or heel-shin-heel  No truncal ataxia.  No resting, postural or action tremor.  No myoclonus.    Sensation: intact to light touch  Other:    11-14    140  |  109<H>  |  16  ----------------------------<  108<H>  3.8   |  26  |  0.92    Ca    10.1      14 Nov 2023 05:32  Phos  2.9     11-14  Mg     2.2     11-14    TPro  7.1  /  Alb  2.5<L>  /  TBili  0.8  /  DBili  x   /  AST  45<H>  /  ALT  30  /  AlkPhos  64  11-14    11-14    140  |  109<H>  |  16  ----------------------------<  108<H>  3.8   |  26  |  0.92    Ca    10.1      14 Nov 2023 05:32  Phos  2.9     11-14  Mg     2.2     11-14    TPro  7.1  /  Alb  2.5<L>  /  TBili  0.8  /  DBili  x   /  AST  45<H>  /  ALT  30  /  AlkPhos  64  11-14    LIVER FUNCTIONS - ( 14 Nov 2023 05:32 )  Alb: 2.5 g/dL / Pro: 7.1 g/dL / ALK PHOS: 64 U/L / ALT: 30 U/L DA / AST: 45 U/L / GGT: x             Radiology    < from: MR Head w/wo IV Cont (11.13.23 @ 17:22) >  Please note that sequences labeled "post contrast" demonstrate no   contrast material. No documentation from technologist was provided as to   reason for failed injection. Postcontrast imaging can be reattempted if   clinically warranted. This was discussed with Dr. Atkins at 9:05 AM   11/14/2023.    No acute infarct, hemorrhage, or mass identified.    < end of copied text >

## 2023-11-14 NOTE — CONSULT NOTE ADULT - PROBLEM SELECTOR RECOMMENDATION 4
? 2/2 Bell's Palsy vs possible CVA, CT/MRI of head negative  Neuro input appreciated.    Continue antiviral tx with valacyclovir and Prednisone

## 2023-11-14 NOTE — CONSULT NOTE ADULT - PROBLEM SELECTOR RECOMMENDATION 7
As above.  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  Patient is medically appropriate for hospice with likely prognosis of months to < 1 year.      See GOC discussion above--patient's wfe/caregiver recently , son overwhelmed.  Unable to decide about code status/LST/long term nutrition at this time  (late update from Dr. Thomson--son now in agreement with DNR/DNI).    Continue management as per primary medical team  Will plan for additional GOC conversations with son  Would benefit from Pall Care team SW support  Palliative care team will continue to follow.

## 2023-11-14 NOTE — CONSULT NOTE ADULT - PROBLEM SELECTOR RECOMMENDATION 3
SLP input appreciated--FEES with evidence of silent laryngeal penetration and aspiration with multiple consistences, currently remains NPO.  Unclear if dysphagia temporary due to concurrent AHRF/coronavirus infection, at significant risk of evolving into long term dysphagia    Continue NPO status for now with strict aspiration precautions  Ongoing f/u by SLP  See [facility] discussion above--son currently undecided about careful hand feeding vs PEG

## 2023-11-14 NOTE — CONSULT NOTE ADULT - CONVERSATION DETAILS
Telephonic family meeting held with sania David x 25 minutes to discuss prognosis, ACP, goals of care, and treatment preferences.  Son reports that patient was living in [facility] with his wife, who served as his primary caretaker; wife just  9 days ago () from lung cancer.  Son has since moved patient into his home in [place].  Son is not aware of any HCP on file, also reports no previous discussions with patient (or his wife) regarding LST preferences.  At baseline patient is minimal ambulatory, mostly bedbound, total care, and only speaks minimal nonsensical language.  Son now realizes that patient has advanced dementia and he can no longer care for him, is requesting long term placement.  Discussed patient's current medical status and that this hospitalization likely represents a significant event for him in regards to his overall disease trajectory, with further decline expected.  Also discussed that patient continues to have severe pulmonary congestion and remains at risk of further respiratory decline during this admission, with the remote possibility that he may not survive this current hospitalization.  Discussed risks/benefits/burdens of allowing natural death vs full CPR.  Son advised that if patient had cardiac arrest requiring CPR he likely would not survive and/or recover to even his current neurologic baseline; also discussed risk of long term ventilatory support if intubated.  Son voiced understanding, but too overwhelmed to make decision about code status at this time, will remain Full Code for the time being.  Similarly, we discussed patient being at risk of long term dysphagia and requiring alternative means of nutrition; briefly advised son regarding risks/benefits/burdens of careful hand feeding for comfort vs PEG placement.  Son counseled that in case of long term dysphagia, PEG unlikely to improve overall survival, will not reverse decline from patient's dementia, and will not prevent aspiration.  Son unable to make decision about enteral feeding at this time.  Referral to [facility] for additional support offered and accepted.  Son was appreciative of the conversation, and all of his questions were answered.

## 2023-11-14 NOTE — CONSULT NOTE ADULT - PROBLEM SELECTOR RECOMMENDATION 9
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  At baseline, patient is essentially bedbound, minimally verbal, and total care in ADLs.  He is FAST stage 7C with a PPS score of 20%, now with worsening dysphagia.  Patient is hospice appropriate with likely prognosis of months.    Continue Aricept 5 mg QHS and risperidone BID  Continue supportive care

## 2023-11-14 NOTE — PROGRESS NOTE ADULT - ATTENDING COMMENTS
83 y.o. M with PMHx of HTN, dementia- aaox 0-1, ambulates w/. walker,  presenting with generalized weakness and shortness of breath. Admitted for sepsis and AHRF 2/2 corona virus. During hospital stay, pt was noted w/ a subtle facial droop arising possibility of acute stroke vs Bell's palsy, hence underwent MR head which was negative.     Pt was seen and examined [facility]  services utilized-Mr. Quinones [id number]. Patient was aaox0, he was not able to cooperate w/ exam, did not follow any commands and was confused.     PE as above    Labs reviewed- cbc, bmp, LFTs    A/P:  #Viral bronchitis   #Aspiration/mucus plugging   #Oropharyngeal dysfunction   #? Bell's palsy   #Acute conjunctivitis   #Acute Hypoxic Respiratory failure 2/2 Corona virus- non-covid - resolved   #Dementia   #Advance care planning     Plan:  -C/w conservative measures, needs frequent suctioning. C/w Duoneb, start 3% saline inhalation. Chest PT   -Maintain strict aspiration precautions   -MR head negative for acute stroke, DC asa. Start valacyclovir and prednisone for presumed bell's palsy.   -Patient's current oropharyngeal dysfunction appears to be a new finding, could be 2/2 current illness w/ inability to clear secretions. Keep NPO for now. Will re-assess for clinical improvement.   -c/w IVF while NPO, poct q 6 hrs.   -Discussed GOC w/ son at length, patient is now DNR/DNI. Also spoke w/ Dr. sampson from [facility] who will follow patient.

## 2023-11-14 NOTE — CONSULT NOTE ADULT - CONSULT REASON
Complex medical decision making in the context of sepsis with AHRF and advanced dementia
mucus plugging
right facial

## 2023-11-14 NOTE — PROGRESS NOTE ADULT - PROBLEM SELECTOR PLAN 3
RVP + coronavirus (not COVID 19)  Chest Xray; no consolidation  received 2.4L ns bolus  f/u blood cultures, urine cultures  ua negative  po guaifenesin 600 q12h d2/3  tylenol prn  Pulm rec:   - maintain strict aspirations at all times, keep head of bed elevated ~45 deg  - agree w/ SLP eval and NPO for now  Supportive care

## 2023-11-14 NOTE — PROGRESS NOTE ADULT - PROBLEM SELECTOR PLAN 4
B/L inflamed conjunctivae - likely viral with secondary bacterial infection   c/w trimethoprim/polymixin eye drops q6

## 2023-11-14 NOTE — CONSULT NOTE ADULT - PROBLEM SELECTOR RECOMMENDATION 5
Clinical evidence indicates that the patient has Moderate protein calorie malnutrition/ 2nd degree  In context of Chronic Illness (>1 month)--advanced dementia, as evidenced by    Energy/Food intake <75% of estimated energy requirement >7 days  Weight loss: Mild  (lbs lost recently)  Body Fat loss: Mild  scant temporal wasting  Muscle mass loss: Mild  albumin 2.5   Strength: weakened mild to moderate, patient mostly bedbound    Recommend:   Continue NPO for now, pending resolution of acute viral infection and ongoing assessment by SLP--strict aspiration precautions, keep head of bed at least 45 degrees during feeding    Son undecided re:  comfort hand feeding vs PEG--see GOC discussion above

## 2023-11-14 NOTE — PROGRESS NOTE ADULT - ASSESSMENT
Impression:  The patient has dementia and pw fever and cough neurology called for right facial weakness, patient not cooperative with exam and evaluation is limited but there is concern for Bell's palsy, MRI brain is -luan for acute CVA     Recommendations:  1.              consider starting Prednisone and Acyclovir for Bell's Plasy  2.  Eval and treatment for fever and cough as per primary team           Impression:  The patient has right facial weakness, patient not cooperative with exam and evaluation is limited but there is concern for Bell's palsy, MRI brain is -luan for acute CVA     Recommendations:  1.              consider starting Prednisone and Acyclovir for Bell's Plasy  2.  Eval and treatment for fever and cough as per primary team    pls call back with questions

## 2023-11-15 NOTE — PROGRESS NOTE ADULT - PROBLEM SELECTOR PLAN 1
pt has a right sided facial droop with right eyelid droop.  family mentions noticing only the right eyelid droop on 11/10/23  difficulty swallowing liquids and solids  speech and swallow consulted-->failed FEES assessment  NPO   CT/MR head: No acute infarct, hemorrhage, or mass identified   Neuro (Dr. Humphries) consulted  possible bells palsy  start prednisone 60mg qd and valcyclovir 1g q8  Palliative consulted re possible PEG pt has a right sided facial droop with right eyelid droop.  family mentions noticing only the right eyelid droop on 11/10/23  difficulty swallowing liquids and solids  speech and swallow consulted-->failed FEES assessment  NPO   CT/MR head: No acute infarct, hemorrhage, or mass identified   Neuro (Dr. Humphries) consulted  possible bells palsy  start prednisone 60mg qd and valcyclovir 1g q8  Palliative consulted re possible PEG--> DNR/DNI pt has a right sided facial droop with right eyelid droop.  family mentions noticing only the right eyelid droop on 11/10/23  difficulty swallowing liquids and solids  speech and swallow consulted-->failed FEES assessment  NPO with crushed meds  CT/MR head: No acute infarct, hemorrhage, or mass identified   Neuro (Dr. Humphries) consulted  possible bells palsy  start prednisone 60mg qd and valcyclovir 1g q8  Palliative consulted re possible PEG--> DNR/DNI  s/p modified barium swallow--> aspiration-->keep NPO-->f/u report

## 2023-11-15 NOTE — PROGRESS NOTE ADULT - PROBLEM SELECTOR PLAN 2
p/w fever, HR >90, hypotension, lactate 1.9  patient is afebrile and normotensive does not meet SIRS criteria.  RVP + coronavirus (not COVID 19)  Had +upper airway sounds, improved after nasal suction  on 3L NC saturating at 99% weaned off oxygen today.   home med: trelegy 200/ 52.5/25 and albuterol 90mcg  c/w duonebs  start hypertonic saline 3% inhalation w/ duonebs  switch mucinex to robitussin  oropharyngeal suctioning TID  Chest PT p/w fever, HR >90, hypotension, lactate 1.9  patient is afebrile and normotensive does not meet SIRS criteria.  RVP + coronavirus (not COVID 19)  Had +upper airway sounds, improved after nasal suction  on 3L NC saturating at 99% weaned off oxygen today.   home med: trelegy 200/ 52.5/25 and albuterol 90mcg  c/w duonebs  start hypertonic saline 3% inhalation w/ duonebs  switched mucinex to robitussin  oropharyngeal suctioning TID  Chest PT p/w fever, HR >90, hypotension, lactate 1.9  patient is afebrile and normotensive does not meet SIRS criteria.  RVP + coronavirus (not COVID 19)  Had +upper airway sounds, improved after nasal suction  on 3L NC saturating at 99% weaned off oxygen today.   home med: trelegy 200/ 52.5/25 and albuterol 90mcg  c/w duonebs q6 and hypertonic saline 3% inhalation w/ duonebs  Chest PT with chest vest q6  switched mucinex to robitussin  oropharyngeal suctioning TID

## 2023-11-15 NOTE — PROGRESS NOTE ADULT - PROBLEM SELECTOR PLAN 3
RVP + coronavirus (not COVID 19)  Chest Xray; no consolidation  received 2.4L ns bolus  f/u blood cultures, urine cultures  ua negative  po guaifenesin 600 q12h d2/3  tylenol prn  Pulm rec:   - maintain strict aspirations at all times, keep head of bed elevated ~45 deg  - agree w/ SLP eval and NPO for now  Supportive care RVP + coronavirus (not COVID 19)  Chest Xray; no consolidation  received 2.4L ns bolus  blood cultures and urine cultures negative  ua negative  c/w po guaifenesin   tylenol prn  Pulm rec:   - maintain strict aspirations at all times, keep head of bed elevated ~45 deg  - agree w/ SLP eval and NPO for now  Supportive care

## 2023-11-15 NOTE — SWALLOW VFSS/MBS ASSESSMENT ADULT - ORAL PHASE
Delayed oral transit time/Reduced anterior - posterior transport/Residue in oral cavity/Incomplete tongue to palate contact/Uncontrolled bolus / spillover in dejan-pharynx/Uncontrolled bolus / spillover in hypopharynx

## 2023-11-15 NOTE — PROGRESS NOTE ADULT - SUBJECTIVE AND OBJECTIVE BOX
PGY-1 Progress Note discussed with attending    PAGER #: [[phone]] TILL 5:00 PM  PLEASE CONTACT ON CALL TEAM:  - On Call Team (Please refer to Darion) FROM 5:00 PM - 8:30PM  - Nightfloat Team FROM 8:30 -7:30 AM    CC: Patient is a 83y old  Male who presents with a chief complaint of Sepsis (14 Nov 2023 17:43)      OVERNIGHT EVENTS:    SUBJECTIVE / INTERVAL HPI: Patient seen and examined at bedside.     ROS:  CONSTITUTIONAL: No weakness, fevers or chills  EYES/ENT: No visual changes;  No vertigo or throat pain   NECK: No pain or stiffness  RESPIRATORY: No cough, wheezing, hemoptysis; No shortness of breath  CARDIOVASCULAR: No chest pain or palpitations  GASTROINTESTINAL: No abdominal or epigastric pain. No nausea, vomiting, or hematemesis; No diarrhea or constipation. No melena or hematochezia.  GENITOURINARY: No dysuria, frequency or hematuria  NEUROLOGICAL: No numbness or weakness  SKIN: No itching, burning, rashes, or lesions     VITAL SIGNS:  Vital Signs Last 24 Hrs  T(C): 37.1 (15 Nov 2023 08:42), Max: 37.4 (14 Nov 2023 16:12)  T(F): 98.8 (15 Nov 2023 08:42), Max: 99.3 (14 Nov 2023 16:12)  HR: 69 (15 Nov 2023 08:42) (62 - 76)  BP: 148/80 (15 Nov 2023 08:42) (148/80 - 173/80)  BP(mean): --  RR: 18 (15 Nov 2023 08:42) (16 - 18)  SpO2: 94% (15 Nov 2023 08:42) (93% - 99%)    Parameters below as of 15 Nov 2023 08:42  Patient On (Oxygen Delivery Method): room air        PHYSICAL EXAM:    General: WDWN  HEENT: NC/AT; PERRL, anicteric sclera; MMM  Neck: supple  Cardiovascular: +S1/S2; RRR  Respiratory: CTA B/L; no W/R/R  Gastrointestinal: soft, NT/ND; +BSx4  Extremities: WWP; no edema, clubbing or cyanosis  Vascular: 2+ radial, DP/PT pulses B/L  Skin: Warm, dry, good turgor, no rashes, or ecchymoses  Neurological: AAOx3; no focal deficits    MEDICATIONS:  MEDICATIONS  (STANDING):  albuterol/ipratropium for Nebulization 3 milliLiter(s) Nebulizer every 6 hours  allopurinol 300 milliGRAM(s) Oral daily  dextrose 5% + sodium chloride 0.9%. 1000 milliLiter(s) (70 mL/Hr) IV Continuous <Continuous>  donepezil 5 milliGRAM(s) Oral at bedtime  enoxaparin Injectable 40 milliGRAM(s) SubCutaneous every 24 hours  guaiFENesin  milliGRAM(s) Oral every 12 hours  losartan 50 milliGRAM(s) Oral daily  pantoprazole    Tablet 40 milliGRAM(s) Oral before breakfast  predniSONE   Tablet 60 milliGRAM(s) Oral daily  risperiDONE   Tablet 0.5 milliGRAM(s) Oral at bedtime  senna 2 Tablet(s) Oral at bedtime  sodium chloride 3%  Inhalation 4 milliLiter(s) Inhalation every 6 hours  trimethoprim/polymyxin Solution 1 Drop(s) Both EYES every 6 hours  valACYclovir 1000 milliGRAM(s) Oral every 8 hours    MEDICATIONS  (PRN):  acetaminophen     Tablet .. 650 milliGRAM(s) Oral every 6 hours PRN Temp greater or equal to 38C (100.4F), Mild Pain (1 - 3)  sodium chloride 0.65% Nasal 1 Spray(s) Both Nostrils two times a day PRN Nasal Congestion      ALLERGIES:  Allergies    No Known Allergies    Intolerances        LABS:                        12.6   5.88  )-----------( 200      ( 15 Nov 2023 05:31 )             38.4     11-15    142  |  111<H>  |  23<H>  ----------------------------<  138<H>  3.9   |  25  |  1.01    Ca    8.9      15 Nov 2023 05:31  Phos  3.0     11-15  Mg     2.5     11-15    TPro  7.1  /  Alb  2.4<L>  /  TBili  0.6  /  DBili  x   /  AST  24  /  ALT  26  /  AlkPhos  63  11-15      Urinalysis Basic - ( 15 Nov 2023 05:31 )    Color: x / Appearance: x / SG: x / pH: x  Gluc: 138 mg/dL / Ketone: x  / Bili: x / Urobili: x   Blood: x / Protein: x / Nitrite: x   Leuk Esterase: x / RBC: x / WBC x   Sq Epi: x / Non Sq Epi: x / Bacteria: x      CAPILLARY BLOOD GLUCOSE          RADIOLOGY & ADDITIONAL TESTS: Reviewed. PGY-1 Progress Note discussed with attending    PAGER #: [[phone]] TILL 5:00 PM  PLEASE CONTACT ON CALL TEAM:  - On Call Team (Please refer to Darion) FROM 5:00 PM - 8:30PM  - Nightfloat Team FROM 8:30 -7:30 AM    CC: Patient is a 83y old  Male who presents with a chief complaint of Sepsis (14 Nov 2023 17:43)      OVERNIGHT EVENTS:    SUBJECTIVE / INTERVAL HPI: Patient seen and examined at bedside.     ROS: limited ROS     VITAL SIGNS:  Vital Signs Last 24 Hrs  T(C): 37.1 (15 Nov 2023 08:42), Max: 37.4 (14 Nov 2023 16:12)  T(F): 98.8 (15 Nov 2023 08:42), Max: 99.3 (14 Nov 2023 16:12)  HR: 69 (15 Nov 2023 08:42) (62 - 76)  BP: 148/80 (15 Nov 2023 08:42) (148/80 - 173/80)  BP(mean): --  RR: 18 (15 Nov 2023 08:42) (16 - 18)  SpO2: 94% (15 Nov 2023 08:42) (93% - 99%)    Parameters below as of 15 Nov 2023 08:42  Patient On (Oxygen Delivery Method): room air        PHYSICAL EXAM:    General: WDWN, NAD  HEENT: NC/AT; PERRL, anicteric sclera; dry mucous membranes  Neck: supple  Cardiovascular: +S1/S2; RRR  Respiratory: significantly improved mostly CTA B/L with diminished breath sounds at left base; no W/R/R  Gastrointestinal: soft, NT/ND; +BSx4  Extremities: WWP; no edema, clubbing or cyanosis  Vascular: 2+ radial, DP/PT pulses B/L  Skin: Warm, dry, good turgor, no rashes, or ecchymoses  Neurological: AAOx1; ptosis R eye, slight R lower facial droop, not able to participate in full neuro exam    MEDICATIONS:  MEDICATIONS  (STANDING):  albuterol/ipratropium for Nebulization 3 milliLiter(s) Nebulizer every 6 hours  allopurinol 300 milliGRAM(s) Oral daily  dextrose 5% + sodium chloride 0.9%. 1000 milliLiter(s) (70 mL/Hr) IV Continuous <Continuous>  donepezil 5 milliGRAM(s) Oral at bedtime  enoxaparin Injectable 40 milliGRAM(s) SubCutaneous every 24 hours  guaiFENesin  milliGRAM(s) Oral every 12 hours  losartan 50 milliGRAM(s) Oral daily  pantoprazole    Tablet 40 milliGRAM(s) Oral before breakfast  predniSONE   Tablet 60 milliGRAM(s) Oral daily  risperiDONE   Tablet 0.5 milliGRAM(s) Oral at bedtime  senna 2 Tablet(s) Oral at bedtime  sodium chloride 3%  Inhalation 4 milliLiter(s) Inhalation every 6 hours  trimethoprim/polymyxin Solution 1 Drop(s) Both EYES every 6 hours  valACYclovir 1000 milliGRAM(s) Oral every 8 hours    MEDICATIONS  (PRN):  acetaminophen     Tablet .. 650 milliGRAM(s) Oral every 6 hours PRN Temp greater or equal to 38C (100.4F), Mild Pain (1 - 3)  sodium chloride 0.65% Nasal 1 Spray(s) Both Nostrils two times a day PRN Nasal Congestion      ALLERGIES:  Allergies    No Known Allergies    Intolerances        LABS:                        12.6   5.88  )-----------( 200      ( 15 Nov 2023 05:31 )             38.4     11-15    142  |  111<H>  |  23<H>  ----------------------------<  138<H>  3.9   |  25  |  1.01    Ca    8.9      15 Nov 2023 05:31  Phos  3.0     11-15  Mg     2.5     11-15    TPro  7.1  /  Alb  2.4<L>  /  TBili  0.6  /  DBili  x   /  AST  24  /  ALT  26  /  AlkPhos  63  11-15      Urinalysis Basic - ( 15 Nov 2023 05:31 )    Color: x / Appearance: x / SG: x / pH: x  Gluc: 138 mg/dL / Ketone: x  / Bili: x / Urobili: x   Blood: x / Protein: x / Nitrite: x   Leuk Esterase: x / RBC: x / WBC x   Sq Epi: x / Non Sq Epi: x / Bacteria: x      CAPILLARY BLOOD GLUCOSE          RADIOLOGY & ADDITIONAL TESTS: Reviewed. PGY-1 Progress Note discussed with attending    PAGER #: [[phone]] TILL 5:00 PM  PLEASE CONTACT ON CALL TEAM:  - On Call Team (Please refer to Darion) FROM 5:00 PM - 8:30PM  - Nightfloat Team FROM 8:30 -7:30 AM    CC: Patient is a 83y old  Male who presents with a chief complaint of Sepsis (14 Nov 2023 17:43)      OVERNIGHT EVENTS:    SUBJECTIVE / INTERVAL HPI: Patient seen and examined at bedside. Does not offer any complaints. Only enorses "old bones" from years of hard work. 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 Not oriented to place or time.      ROS: limited ROS     VITAL SIGNS:  Vital Signs Last 24 Hrs  T(C): 37.1 (15 Nov 2023 08:42), Max: 37.4 (14 Nov 2023 16:12)  T(F): 98.8 (15 Nov 2023 08:42), Max: 99.3 (14 Nov 2023 16:12)  HR: 69 (15 Nov 2023 08:42) (62 - 76)  BP: 148/80 (15 Nov 2023 08:42) (148/80 - 173/80)  BP(mean): --  RR: 18 (15 Nov 2023 08:42) (16 - 18)  SpO2: 94% (15 Nov 2023 08:42) (93% - 99%)    Parameters below as of 15 Nov 2023 08:42  Patient On (Oxygen Delivery Method): room air        PHYSICAL EXAM:    General: WDWN, NAD  HEENT: NC/AT; PERRL, anicteric sclera; dry mucous membranes  Neck: supple  Cardiovascular: +S1/S2; RRR  Respiratory: significantly improved mostly CTA B/L with diminished breath sounds at left base; no W/R/R  Gastrointestinal: soft, NT/ND; +BSx4  Extremities: WWP; no edema, clubbing or cyanosis  Vascular: 2+ radial, DP/PT pulses B/L  Skin: Warm, dry, good turgor, no rashes, or ecchymoses  Neurological: AAOx1; ptosis R eye, slight R lower facial droop, not able to participate in full neuro exam    MEDICATIONS:  MEDICATIONS  (STANDING):  albuterol/ipratropium for Nebulization 3 milliLiter(s) Nebulizer every 6 hours  allopurinol 300 milliGRAM(s) Oral daily  dextrose 5% + sodium chloride 0.9%. 1000 milliLiter(s) (70 mL/Hr) IV Continuous <Continuous>  donepezil 5 milliGRAM(s) Oral at bedtime  enoxaparin Injectable 40 milliGRAM(s) SubCutaneous every 24 hours  guaiFENesin  milliGRAM(s) Oral every 12 hours  losartan 50 milliGRAM(s) Oral daily  pantoprazole    Tablet 40 milliGRAM(s) Oral before breakfast  predniSONE   Tablet 60 milliGRAM(s) Oral daily  risperiDONE   Tablet 0.5 milliGRAM(s) Oral at bedtime  senna 2 Tablet(s) Oral at bedtime  sodium chloride 3%  Inhalation 4 milliLiter(s) Inhalation every 6 hours  trimethoprim/polymyxin Solution 1 Drop(s) Both EYES every 6 hours  valACYclovir 1000 milliGRAM(s) Oral every 8 hours    MEDICATIONS  (PRN):  acetaminophen     Tablet .. 650 milliGRAM(s) Oral every 6 hours PRN Temp greater or equal to 38C (100.4F), Mild Pain (1 - 3)  sodium chloride 0.65% Nasal 1 Spray(s) Both Nostrils two times a day PRN Nasal Congestion      ALLERGIES:  Allergies    No Known Allergies    Intolerances        LABS:                        12.6   5.88  )-----------( 200      ( 15 Nov 2023 05:31 )             38.4     11-15    142  |  111<H>  |  23<H>  ----------------------------<  138<H>  3.9   |  25  |  1.01    Ca    8.9      15 Nov 2023 05:31  Phos  3.0     11-15  Mg     2.5     11-15    TPro  7.1  /  Alb  2.4<L>  /  TBili  0.6  /  DBili  x   /  AST  24  /  ALT  26  /  AlkPhos  63  11-15      Urinalysis Basic - ( 15 Nov 2023 05:31 )    Color: x / Appearance: x / SG: x / pH: x  Gluc: 138 mg/dL / Ketone: x  / Bili: x / Urobili: x   Blood: x / Protein: x / Nitrite: x   Leuk Esterase: x / RBC: x / WBC x   Sq Epi: x / Non Sq Epi: x / Bacteria: x      CAPILLARY BLOOD GLUCOSE          RADIOLOGY & ADDITIONAL TESTS: Reviewed.

## 2023-11-15 NOTE — SWALLOW VFSS/MBS ASSESSMENT ADULT - ADDITIONAL RECOMMENDATIONS
+ [place] Free Water Protocol: Pt may have ice chips any time ONLY after oral care is completed, for QOL.   +  Defer to medical team with considerations of Pt's advanced age for tube feeding vs comfort feedings.   + May consider alternative means of nutrition/hydration if in alignment with pt's/HCP's goals of care.

## 2023-11-15 NOTE — CHART NOTE - NSCHARTNOTEFT_GEN_A_CORE
PC SW received verbal consult for emotional support for pt's son. PC SW called pt's son Frankie ([phone]) for support. Pt's son reflected on the pt's hospitalization and his mother's recent passing. Pt's son reported that he is adjusting appropriately and has no psychosocial needs at this time. Pt's son expressed appreciation for the call, and agreed to reach out as needed.

## 2023-11-15 NOTE — PROGRESS NOTE ADULT - ASSESSMENT
82yo man trivial distant smoking hx w/ PMH dementia presented with weakness, dyspnea and cough; found with +coronavirus (non-COVID) and also being worked up for CVA with presentation suggestive of viral bronchitis possibly complicated by aspiration/impaired airway clearance. Chest imaging without consolidation, and with increased mucus production possibly with impaired clearance, suggestive of bronchitis - unknown baseline and primary team also working up CVA.     #Viral bronchitis  #Coronavirus (non-COVID) infection  #Aspiration/mucus plugging    Recommendations:  - conservative mgmt for coronavirus infection  - airway clearance regimen as follows:  --> duoneb  q6h standing   --> sodium chloride 3% neb q6h with albuterol/duoneb   --> chest PT vest trial  - suctioning PRN  - maintain strict aspirations at all times, keep head of bed elevated ~45 deg  - FEES with severe oropharyngeal dysfunction, remains NPO

## 2023-11-15 NOTE — SWALLOW VFSS/MBS ASSESSMENT ADULT - NS SWALLOW VFSS REC ASPIR MON
May need routine suctioning/change of breathing pattern/oral hygiene/position upright (90Y)/cough/gurgly voice/fever/pneumonia/upper respiratory infection

## 2023-11-15 NOTE — PROGRESS NOTE ADULT - SUBJECTIVE AND OBJECTIVE BOX
PULMONARY SERVICE FOLLOW-UP NOTE    Pt appears delirious, trying to get off the bed. He appears agitated but no respiratory distress    REVIEW OF SYSTEMS (limited by patient participation in interview):  See above    MEDICATIONS:  MEDICATIONS  (STANDING):  acyclovir   Oral Tab/Cap 400 milliGRAM(s) Oral five times a day  albuterol/ipratropium for Nebulization 3 milliLiter(s) Nebulizer every 6 hours  allopurinol 300 milliGRAM(s) Oral daily  dextrose 5% + sodium chloride 0.9%. 1000 milliLiter(s) (70 mL/Hr) IV Continuous <Continuous>  donepezil 5 milliGRAM(s) Oral at bedtime  enoxaparin Injectable 40 milliGRAM(s) SubCutaneous every 24 hours  guaiFENesin  milliGRAM(s) Oral every 12 hours  lactated ringers. 1000 milliLiter(s) (75 mL/Hr) IV Continuous <Continuous>  losartan 50 milliGRAM(s) Oral daily  pantoprazole    Tablet 40 milliGRAM(s) Oral before breakfast  predniSONE   Tablet 40 milliGRAM(s) Oral daily  risperiDONE   Tablet 0.5 milliGRAM(s) Oral at bedtime  senna 2 Tablet(s) Oral at bedtime  sodium chloride 0.9%. 1000 milliLiter(s) (100 mL/Hr) IV Continuous <Continuous>  sodium chloride 3%  Inhalation 4 milliLiter(s) Inhalation every 6 hours  trimethoprim/polymyxin Solution 1 Drop(s) Both EYES every 6 hours    MEDICATIONS  (PRN):  acetaminophen     Tablet .. 650 milliGRAM(s) Oral every 6 hours PRN Temp greater or equal to 38C (100.4F), Mild Pain (1 - 3)  sodium chloride 0.65% Nasal 1 Spray(s) Both Nostrils two times a day PRN Nasal Congestion    Allergies    No Known Allergies    Intolerances    Vital Signs Last 24 Hrs  Vital Signs Last 24 Hrs  T(C): 36.9 (15 Nov 2023 15:53), Max: 37.1 (15 Nov 2023 04:53)  T(F): 98.5 (15 Nov 2023 15:53), Max: 98.8 (15 Nov 2023 04:53)  HR: 88 (15 Nov 2023 15:53) (62 - 88)  BP: 169/82 (15 Nov 2023 15:53) (148/80 - 173/80)  BP(mean): --  RR: 19 (15 Nov 2023 15:53) (16 - 19)  SpO2: 96% (15 Nov 2023 15:53) (92% - 99%)    Parameters below as of 15 Nov 2023 15:53  Patient On (Oxygen Delivery Method): room air      PHYSICAL EXAM:  Constitutional: non-toxic elderly man resting in bed; NAD  Head: atraumatic  EENT: conjunctival injection b/l w/ right > left eyelid fullness, right droop; oropharynx clear, MMM, ?lip swelling  Neck: supple, no appreciable JVD  Respiratory: lungs CTA with good aeration to both bases; respirations appear non-labored  Cardiovascular: +S1/S2, RRR  Gastrointestinal: soft, NT/ND  Extremities: WWP; no edema, clubbing or cyanosis   Vascular: 2+ radial pulses B/L  Neurological: awake, somewhat constricted affect; right facial droop (per primary team, finding has been noted already- full strength testing deferred, pt undergoing CVA eval since admission)    LABS:                                     12.6   5.88  )-----------( 200      ( 15 Nov 2023 05:31 )             38.4   11-15    142  |  111<H>  |  23<H>  ----------------------------<  138<H>  3.9   |  25  |  1.01    Ca    8.9      15 Nov 2023 05:31  Phos  3.0     11-15  Mg     2.5     11-15    TPro  7.1  /  Alb  2.4<L>  /  TBili  0.6  /  DBili  x   /  AST  24  /  ALT  26  /  AlkPhos  63  11-15        RADIOLOGY & ADDITIONAL STUDIES (personally reviewed):  < from: Xray Chest 1 View- PORTABLE-Urgent (11.11.23 @ 01:39) >  IMPRESSION: Clear lungs.

## 2023-11-15 NOTE — SWALLOW VFSS/MBS ASSESSMENT ADULT - BEHAVIORAL MODIFICATIONS
Of Note, Pt was physically & verbally prompted for left-head turn posture during swallows at this exam. While this technique appeared to facilitate in improving pharyngeal clearance, Pt could not maintain without continual/frequent cueing.

## 2023-11-15 NOTE — SWALLOW VFSS/MBS ASSESSMENT ADULT - SLP PERTINENT HISTORY OF CURRENT PROBLEM
83 y.o. M with PMHx of HTN, [place], dementia, presenting with generalized weakness and increasing shortness of breath. Admitted for sepsis 2/2 AHRF. Found to have B/L inflamed conjunctivae - likely viral with secondary bacterial infection. RVP was checked and positive for coronavirus (not COVID-19). CXR negative for infiltrate.

## 2023-11-15 NOTE — SWALLOW VFSS/MBS ASSESSMENT ADULT - DIAGNOSTIC IMPRESSIONS
Pt p/w moderate-sever oropharyngeal dysphagia, with Silent laryngeal penetration noted along the laryngeal surface epiglottis. Residue was evident and appeared to eventually reach the level of the vocal folds, thin liquids . Of Note, Pt was physically & verbally prompted for left-head turn posture during swallows at this exam. While this technique appeared to facilitate in improving pharyngeal clearance, Pt could not maintain without continual/frequent cueing. Realistically, it is not likely Pt will be able to execute this posture consistently to have a safe swallow during meals. Pt p/w moderate-severe oropharyngeal dysphagia, exacerbated by existing congestion, reduced expectoration, & impaired secretion management. Silent laryngeal penetration noted along the laryngeal surface (underside) of the epiglottis. Residue was evident and appeared to eventually reach the level of the vocal folds on thin liquids. Of Note, Pt was physically & verbally prompted for left-head turn posture during swallows at this exam to facilitate in improving pharyngeal clearance. However, Pt could not maintain without continual/frequent cueing from SLP. Realistically, Pt is unlikely to execute this posture consistently enough to have a safe swallow during meals.

## 2023-11-15 NOTE — SWALLOW VFSS/MBS ASSESSMENT ADULT - ORAL PREP COMMENTS
Weak labial seal across all trials. Reduced lingual strength/ROM noted, with sublingual loss on Mildly thick & thin liquids.

## 2023-11-15 NOTE — SWALLOW VFSS/MBS ASSESSMENT ADULT - SLP GENERAL OBSERVATIONS
Study conducted with Radiologist, Dr. Shanks. Pt seated in imaging chair in left lateral view for videofluoroscopy. PO trials of Puree, Moderately & Mildly thick liquids, and thin liquids administered.

## 2023-11-15 NOTE — SWALLOW VFSS/MBS ASSESSMENT ADULT - PHARYNGEAL PHASE COMMENTS
Delayed swallow reflex seen with reduced hyoid excursion, poor laryngeal elevation, resulting in poor/absent epiglottic inversion. On Mildly thick liquids & thin liquids, velar closure was reduced, resulting in material entering the opening of the nasal cavity, w/ stasis remaining in nasoopharynx.   Pooling at vallecular level, subsequently spilling to pyriform sinuses across all consistencies, spillage of thin > thick & puree. On Mildly thick liquids via teaspoon, Silent laryngeal penetration noted along the laryngeal surface of the epiglottis. Residue was evident and appeared to eventually spill into the laryngeal vestibule. On thin liquids via teaspoon, combined with remaining hypopharyngeal residue, Laryngeal Penetration to secondary aspiration was seen, followed by cough during exam. Pt could not clear aspirated material despite multiple swallows & cough. Delayed swallow reflex seen with reduced hyoid excursion, poor laryngeal elevation, resulting in poor/absent epiglottic inversion. On Mildly thick liquids & thin liquids, velar closure was reduced, resulting in material entering the opening of the nasal cavity, w/ stasis remaining in nasopharynx.   Pooling at vallecular level, subsequently spilling to pyriform sinuses across all consistencies, spillage of thin > thick & puree. On Mildly thick liquids via teaspoon, Silent laryngeal penetration noted along the underside of the epiglottis. Residue was evident and appeared to eventually spill into the laryngeal vestibule. On thin liquids via teaspoon, combined with remaining hypopharyngeal residue, Laryngeal Penetration to secondary aspiration was seen, followed by cough during exam. Pt could not clear aspirated material despite multiple swallows & cough.

## 2023-11-15 NOTE — SWALLOW VFSS/MBS ASSESSMENT ADULT - ORAL PHASE COMMENTS
Discoordinated lingual movement resulting in impaired bolus formation and slow A-P transport. Moderate oral stasis observed, with premature spillage of all boluses to the hypopharynx.  Poor BOT contact to the posterior pharyngeal wall, resulting in moderate oral stasis remaining in the pharynx, despite multiple repeat swallows.

## 2023-11-16 NOTE — PROGRESS NOTE ADULT - SUBJECTIVE AND OBJECTIVE BOX
follow up on:  complex medical decision making related to goals of care    [facility] Geriatric and Palliative Consult Service:  Damaris Brown DO: cell [phone]  Larry Kenny MD: cell ([phone])  Nicholas Alvarado NP: cell ([phone])   Bryan Tirado LMSW: cell ([phone])   Agatha Guerin NP: via [employer]    Can contact any Palliative Team member via [employer] for consults and questions      OVERNIGHT EVENTS:    Present Symptoms: Mild, Moderate, Severe  Pain:             Location -                               Aggravating factors -             Quality -             Radiation -             Timing-             Severity (0-10 scale):             Minimal acceptable level (0-10 scale):  Fatigue:  Nausea:  Lack of Appetite:   SOB:  Depression:  Anxiety:  Review of Systems: [All others negative or Unable to obtain due to poor mentation]    CPOT:    https://ccs-st.org/resources/Documents/Sedation%20Analgesia%20Delirium%20in%20CC/CCS%20STH%20Guideline%20template%20CPOT.pdf  PAIN AD Score:   [email or web address] (press ctrl +  left click to view)MEDICATIONS  (STANDING):  albuterol/ipratropium for Nebulization 3 milliLiter(s) Nebulizer every 6 hours  allopurinol 300 milliGRAM(s) Oral daily  dextrose 5% + sodium chloride 0.9%. 1000 milliLiter(s) (75 mL/Hr) IV Continuous <Continuous>  donepezil 5 milliGRAM(s) Oral at bedtime  enoxaparin Injectable 40 milliGRAM(s) SubCutaneous every 24 hours  guaiFENesin Oral Liquid (Sugar-Free) 200 milliGRAM(s) Oral every 6 hours  losartan 50 milliGRAM(s) Oral daily  pantoprazole    Tablet 40 milliGRAM(s) Oral before breakfast  predniSONE   Tablet 60 milliGRAM(s) Oral daily  risperiDONE   Tablet 0.5 milliGRAM(s) Oral two times a day  senna 2 Tablet(s) Oral at bedtime  sodium chloride 3%  Inhalation 4 milliLiter(s) Inhalation every 6 hours  trimethoprim/polymyxin Solution 1 Drop(s) Both EYES every 6 hours  valACYclovir 1000 milliGRAM(s) Oral every 8 hours    MEDICATIONS  (PRN):  acetaminophen     Tablet .. 650 milliGRAM(s) Oral every 6 hours PRN Temp greater or equal to 38C (100.4F), Mild Pain (1 - 3)  sodium chloride 0.65% Nasal 1 Spray(s) Both Nostrils two times a day PRN Nasal Congestion      PHYSICAL EXAM:  Vital Signs Last 24 Hrs  T(C): 36.5 (16 Nov 2023 20:50), Max: 37.1 (16 Nov 2023 07:43)  T(F): 97.7 (16 Nov 2023 20:50), Max: 98.7 (16 Nov 2023 07:43)  HR: 81 (16 Nov 2023 20:50) (68 - 89)  BP: 154/98 (16 Nov 2023 20:50) (148/71 - 164/91)  BP(mean): --  RR: 19 (16 Nov 2023 20:50) (18 - 19)  SpO2: 98% (16 Nov 2023 20:50) (94% - 98%)    Parameters below as of 16 Nov 2023 20:50  Patient On (Oxygen Delivery Method): room air        General: alert  oriented x ____    lethargic distressed cachexia  verbal nonverbal  unarousable     Palliative Performance Scale/Karnofsky Score:  http://npcrc.org/files/news/palliative_performance_scale_ppsv2.pdf    HEENT: no abnormal lesion, dry mouth  ET tube/trach oral lesions:  Lungs: tachypnea/labored breathing, audible excessive secretions  CV: RRR, S1S2, tachycardia  GI: soft non distended non tender  incontinent               PEG/NG/OG tube  constipation  last BM:   : incontinent  oliguria/anuria  manzanares  Musculoskeletal: weakness x4 edema x4    ambulatory with assistance   mostly/fully bedbound/wheelchair bound  Skin: no abnormal skin lesions, poor skin turgor, pressure ulcers stage:   Neuro: no deficits, mild cognitive impairment dsyphagia/dysarthria paresis  Oral intake ability: unable/only mouth care, minimal moderate full capability    LABS:                          12.3   5.47  )-----------( 206      ( 16 Nov 2023 05:29 )             37.0     11-16    144  |  114<H>  |  21<H>  ----------------------------<  96  3.4<L>   |  25  |  0.88    Ca    8.9      16 Nov 2023 05:29  Phos  2.0     11-16  Mg     2.5     11-16    TPro  7.1  /  Alb  2.4<L>  /  TBili  0.6  /  DBili  x   /  AST  24  /  ALT  26  /  AlkPhos  63  11-15    Urinalysis Basic - ( 16 Nov 2023 05:29 )    Color: x / Appearance: x / SG: x / pH: x  Gluc: 96 mg/dL / Ketone: x  / Bili: x / Urobili: x   Blood: x / Protein: x / Nitrite: x   Leuk Esterase: x / RBC: x / WBC x   Sq Epi: x / Non Sq Epi: x / Bacteria: x        RADIOLOGY & ADDITIONAL STUDIES: follow up on:  complex medical decision making related to goals of care    [facility] Geriatric and Palliative Consult Service:  Damaris Brown DO: cell ([phone])  Larry Kenny MD: cell ([phone])  Nicholas Alvarado NP: cell ([phone])   Bryan Tirado LMSW: cell ([phone])   Agatha Guerin NP: via [employer]    Can contact any Palliative Team member via [employer] Teams for consults and questions      OVERNIGHT EVENTS:  Respiratory congestion overall much improved.  Otherwise none.  Had f/u MBS study on 11/15--patient with continued moderate to severe oropharyngeal dysphagia throughout, remains NPO    Patient examined at bedside this afternoon.  History obtained with use of Dexetra speaking  via video  service (ID# [id number], [place]o).  Patient alert and oriented x 1, more responsive, still mostly speaking in Cantonese gibberish.  Able to deny pain, not really able to answer other questions, though when asked why he is in hospital, he points to the ceiling and stated "doctor told me I am very sick."  Able to follow limited commands.  Appears comfortable.  No other acute issues reported by bedside nursing staff.    Present Symptoms: Mild, Moderate, Severe  Pain:  Denies, 0/10      Review of Systems: Otherwise unable to obtain due to poor mentation/dementia    CPOT:  0  https://ccs-sth.org/resources/Documents/Sedation%20Analgesia%20Delirium%20in%20CC/CCS%20STH%20Guideline%20template%20CPOT.pdf  PAIN AD Score:   0  [email or web address] (press ctrl +  left click to view)    MEDICATIONS  (STANDING):  albuterol/ipratropium for Nebulization 3 milliLiter(s) Nebulizer every 6 hours  allopurinol 300 milliGRAM(s) Oral daily  dextrose 5% + sodium chloride 0.9%. 1000 milliLiter(s) (75 mL/Hr) IV Continuous <Continuous>  donepezil 5 milliGRAM(s) Oral at bedtime  enoxaparin Injectable 40 milliGRAM(s) SubCutaneous every 24 hours  guaiFENesin Oral Liquid (Sugar-Free) 200 milliGRAM(s) Oral every 6 hours  losartan 50 milliGRAM(s) Oral daily  pantoprazole    Tablet 40 milliGRAM(s) Oral before breakfast  predniSONE   Tablet 60 milliGRAM(s) Oral daily  risperiDONE   Tablet 0.5 milliGRAM(s) Oral two times a day  senna 2 Tablet(s) Oral at bedtime  sodium chloride 3%  Inhalation 4 milliLiter(s) Inhalation every 6 hours  trimethoprim/polymyxin Solution 1 Drop(s) Both EYES every 6 hours  valACYclovir 1000 milliGRAM(s) Oral every 8 hours    MEDICATIONS  (PRN):  acetaminophen     Tablet .. 650 milliGRAM(s) Oral every 6 hours PRN Temp greater or equal to 38C (100.4F), Mild Pain (1 - 3)  sodium chloride 0.65% Nasal 1 Spray(s) Both Nostrils two times a day PRN Nasal Congestion      PHYSICAL EXAM:  Vital Signs Last 24 Hrs  T(C): 36.5 (16 Nov 2023 20:50), Max: 37.1 (16 Nov 2023 07:43)  T(F): 97.7 (16 Nov 2023 20:50), Max: 98.7 (16 Nov 2023 07:43)  HR: 81 (16 Nov 2023 20:50) (68 - 89)  BP: 154/98 (16 Nov 2023 20:50) (148/71 - 164/91)  BP(mean): --  RR: 19 (16 Nov 2023 20:50) (18 - 19)  SpO2: 98% (16 Nov 2023 20:50) (94% - 98%)    Parameters below as of 16 Nov 2023 20:50  Patient On (Oxygen Delivery Method): room air        General: alert  oriented x __1 __   to name only , more responsive, scant temporal wasting, NAD    Palliative Performance Scale/Karnofsky Score:  20%  http://npcrc.org/files/news/palliative_performance_scale_ppsv2.pdf    HEENT:  EOMI anicteric, + mild conjunctival injection OU, pharynx clear  Lungs:  still mild upper airway congestion noted (improved), otherwise clear to auscultation, respirations unlabored  CV:  RSR, normal S1 and S2, no murmur  GI: soft non distended non tender + bowel sounds  : incontinent    Musculoskeletal: weakness x4  in all extremities, essentially bedbound, no edema noted  Skin: no abnormal skin lesions or DU noted  Neuro:  minimal R facial droop appreciated, + severe cognitive impairment, + dysphagia  Oral intake ability: unable/mouth care only, currently NPO      LABS:                          12.3   5.47  )-----------( 206      ( 16 Nov 2023 05:29 )             37.0     11-16    144  |  114<H>  |  21<H>  ----------------------------<  96  3.4<L>   |  25  |  0.88    Ca    8.9      16 Nov 2023 05:29  Phos  2.0     11-16  Mg     2.5     11-16    TPro  7.1  /  Alb  2.4<L>  /  TBili  0.6  /  DBili  x   /  AST  24  /  ALT  26  /  AlkPhos  63  11-15    Urinalysis Basic - ( 16 Nov 2023 05:29 )    Color: x / Appearance: x / SG: x / pH: x  Gluc: 96 mg/dL / Ketone: x  / Bili: x / Urobili: x   Blood: x / Protein: x / Nitrite: x   Leuk Esterase: x / RBC: x / WBC x   Sq Epi: x / Non Sq Epi: x / Bacteria: x        RADIOLOGY & ADDITIONAL STUDIES:

## 2023-11-16 NOTE — PROGRESS NOTE ADULT - PROBLEM SELECTOR PLAN 3
SLP input appreciated--FEES with evidence of silent laryngeal penetration and aspiration with multiple consistences, currently remains NPO.  Unclear if dysphagia temporary due to concurrent AHRF/coronavirus infection, at significant risk of evolving into long term dysphagia    MBS from 11/15 confirmed moderate to severe oropharyngeal discussion--per my discussion with SLP, likely 2/2 progressive dementia, unlikely to resolve over the short term    Continue NPO status for now with strict aspiration precautions  Ongoing f/u by SLP  See previous C discussion from 11/14--son currently undecided about careful hand feeding vs PEG.  Attempted to call son late this afternoon for additional discussion, no answer, voicemail message left

## 2023-11-16 NOTE — ADVANCED PRACTICE NURSE CONSULT - RECOMMEDATIONS
-Clean the Perianal and Bilateral Gluteal areas with warm water, mild soap, pat dry, and apply skin prep to the surrounding skin  -Apply TRIAD Moisture Barrier Cream b.i.d. PRN  -Frequent toileting  -Elevate/float the patients heels using heel protectors and reposition the patient Q 2hrs using wedges or pillows

## 2023-11-16 NOTE — ADVANCED PRACTICE NURSE CONSULT - ASSESSMENT
This is a 83yr old male patient admitted for Sepsis, presenting with evidence of Moisture Associated Skin Damage to the Perianal and Bilateral Gluteal areas

## 2023-11-16 NOTE — PROGRESS NOTE ADULT - SUBJECTIVE AND OBJECTIVE BOX
PGY-1 Progress Note discussed with attending    PAGER #: [[phone]] TILL 5:00 PM  PLEASE CONTACT ON CALL TEAM:  - On Call Team (Please refer to Darion) FROM 5:00 PM - 8:30PM  - Nightfloat Team FROM 8:30 -7:30 AM    CC: Patient is a 83y old  Male who presents with a chief complaint of Sepsis (15 Nov 2023 17:35)      OVERNIGHT EVENTS:    SUBJECTIVE / INTERVAL HPI: Patient seen and examined at bedside. Interactive, says hello and thank you. Follows simple commands. Able to confirm his name. Not endorsing any complaints. Limited responses to questions.     ROS: unable to obtain full ROS    VITAL SIGNS:  Vital Signs Last 24 Hrs  T(C): 36.7 (16 Nov 2023 11:14), Max: 37.1 (16 Nov 2023 07:43)  T(F): 98 (16 Nov 2023 11:14), Max: 98.7 (16 Nov 2023 07:43)  HR: 83 (16 Nov 2023 11:14) (65 - 88)  BP: 158/76 (16 Nov 2023 11:14) (135/65 - 169/82)  BP(mean): --  RR: 18 (16 Nov 2023 11:14) (18 - 19)  SpO2: 98% (16 Nov 2023 11:14) (96% - 99%)    Parameters below as of 16 Nov 2023 11:14  Patient On (Oxygen Delivery Method): room air        PHYSICAL EXAM:    General: WDWN, NAD  HEENT: NC/AT; ?R pupil > L, reactive to light, anicteric sclera; dry mucous membranes  Neck: supple  Cardiovascular: +S1/S2; RRR  Respiratory: significantly improved mostly CTA B/L with diminished breath sounds at left base; no W/R/R  Gastrointestinal: soft, NT/ND; +BSx4  Extremities: WWP; no edema, clubbing or cyanosis  Vascular: 2+ radial, DP/PT pulses B/L  Skin: Warm, dry, good turgor, no rashes, or ecchymoses  Neurological: AAOx1; ptosis R eye, slight R lower facial droop, spastic facial muscles when instructed to smile, tongue fasciculations CN not able to participate in full neuro exam    MEDICATIONS:  MEDICATIONS  (STANDING):  albuterol/ipratropium for Nebulization 3 milliLiter(s) Nebulizer every 6 hours  allopurinol 300 milliGRAM(s) Oral daily  dextrose 5% + sodium chloride 0.9%. 1000 milliLiter(s) (75 mL/Hr) IV Continuous <Continuous>  donepezil 5 milliGRAM(s) Oral at bedtime  enoxaparin Injectable 40 milliGRAM(s) SubCutaneous every 24 hours  guaiFENesin Oral Liquid (Sugar-Free) 200 milliGRAM(s) Oral every 6 hours  losartan 50 milliGRAM(s) Oral daily  pantoprazole    Tablet 40 milliGRAM(s) Oral before breakfast  predniSONE   Tablet 60 milliGRAM(s) Oral daily  risperiDONE   Tablet 0.5 milliGRAM(s) Oral two times a day  senna 2 Tablet(s) Oral at bedtime  sodium chloride 3%  Inhalation 4 milliLiter(s) Inhalation every 6 hours  trimethoprim/polymyxin Solution 1 Drop(s) Both EYES every 6 hours  valACYclovir 1000 milliGRAM(s) Oral every 8 hours    MEDICATIONS  (PRN):  acetaminophen     Tablet .. 650 milliGRAM(s) Oral every 6 hours PRN Temp greater or equal to 38C (100.4F), Mild Pain (1 - 3)  sodium chloride 0.65% Nasal 1 Spray(s) Both Nostrils two times a day PRN Nasal Congestion      ALLERGIES:  Allergies    No Known Allergies    Intolerances        LABS:                        12.3   5.47  )-----------( 206      ( 16 Nov 2023 05:29 )             37.0     11-16    144  |  114<H>  |  21<H>  ----------------------------<  96  3.4<L>   |  25  |  0.88    Ca    8.9      16 Nov 2023 05:29  Phos  2.0     11-16  Mg     2.5     11-16    TPro  7.1  /  Alb  2.4<L>  /  TBili  0.6  /  DBili  x   /  AST  24  /  ALT  26  /  AlkPhos  63  11-15      Urinalysis Basic - ( 16 Nov 2023 05:29 )    Color: x / Appearance: x / SG: x / pH: x  Gluc: 96 mg/dL / Ketone: x  / Bili: x / Urobili: x   Blood: x / Protein: x / Nitrite: x   Leuk Esterase: x / RBC: x / WBC x   Sq Epi: x / Non Sq Epi: x / Bacteria: x      CAPILLARY BLOOD GLUCOSE          RADIOLOGY & ADDITIONAL TESTS: Reviewed.

## 2023-11-16 NOTE — PROGRESS NOTE ADULT - ATTENDING COMMENTS
83 y.o. M with PMHx of HTN, dementia- aaox 0-1, ambulates w/. walker,  presenting with generalized weakness and shortness of breath. Admitted for sepsis and AHRF 2/2 corona virus. During hospital stay, pt was noted w/ a subtle facial droop arising possibility of acute stroke vs Bell's palsy, hence underwent MR head which was negative.     Pt was seen and examined, was aao x1 to his name and followed some commands.     PE as above    Labs reviewed- cbc, bmp, Mg, PO4     A/P:  #Viral bronchitis   #Aspiration/mucus plugging   #Oropharyngeal dysfunction   #? Bell's palsy   #Acute conjunctivitis   #Acute Hypoxic Respiratory failure 2/2 Corona virus- non-covid - resolved   #Dementia   #Advance care planning     Plan:  -C/w conservative measures, needs frequent suctioning. C/w Duoneb, 3% saline inhalation. Chest PT   -Maintain strict aspiration precautions   -C/w valacyclovir and prednisone for presumed bell's palsy.   -Patient's current oropharyngeal dysfunction could be 2/2 Bell's palsy vs acute illnesses. Failed SnS eval. FEES eval noted, keep NPO for now. Ongoing palliative care discussion w/ son. If patient persists to fail SnS, may need FT to establish nutrition if within GOC.   -c/w IVF while NPO, poct q 6 hrs.   -BP elevated, c/w losartan.   -DNR/DNI 83 y.o. M with PMHx of HTN, dementia- aaox 0-1, ambulates w/. walker,  presenting with generalized weakness and shortness of breath. Admitted for sepsis and AHRF 2/2 corona virus. During hospital stay, pt was noted w/ a subtle facial droop arising possibility of acute stroke vs Bell's palsy, hence underwent MR head which was negative.     Pt was seen and examined, was aao x1 to his name and followed some commands.     PE as above    Labs reviewed- cbc, bmp, Mg, PO4     A/P:  #Viral bronchitis   #Aspiration/mucus plugging   #Oropharyngeal dysfunction   #Bell's palsy   #Acute conjunctivitis   #Acute Hypoxic Respiratory failure 2/2 Corona virus- non-covid - resolved   #Dementia   #Advance care planning     Plan:  -C/w conservative measures, needs frequent suctioning. C/w Duoneb, 3% saline inhalation. Chest PT   -Maintain strict aspiration precautions   -C/w valacyclovir and prednisone for bell's palsy.   -Patient's current oropharyngeal dysfunction could be 2/2 Bell's palsy vs acute illnesses. Failed SnS eval. FEES eval noted, keep NPO for now. Ongoing palliative care discussion w/ son. If patient persists to fail SnS, may need FT to establish nutrition if within GOC.   -c/w IVF while NPO, poct q 6 hrs.   -BP elevated, c/w losartan.   -DNR/DNI

## 2023-11-16 NOTE — PROGRESS NOTE ADULT - PROBLEM SELECTOR PLAN 2
Due to severe viral URI/bronchitis and non-COVID coronavirus infection, with significant pulmonary congestion.  Congestion much improved over last 24-48 hours  Pulmonary input appreciated, continue Duonebs and saline nebs  Otherwise continue management as per primary medical team.

## 2023-11-16 NOTE — CHART NOTE - NSCHARTNOTEFT_GEN_A_CORE
Assessment:     Factors impacting intake: [ ] none [ ] nausea  [ ] vomiting [ ] diarrhea [ ] constipation  [ ]chewing problems [ ] swallowing issues  [ ] other:     Diet Presciption: Diet, NPO:   Except Medications  With Ice Chips/Sips of Water (23 @ 15:31)    Intake:     Daily Weight in k.5 (2023 05:04)  Weight in k.3 (2023 05:13)    % Weight Change    Pertinent Medications: MEDICATIONS  (STANDING):  albuterol/ipratropium for Nebulization 3 milliLiter(s) Nebulizer every 6 hours  allopurinol 300 milliGRAM(s) Oral daily  dextrose 5% + sodium chloride 0.9%. 1000 milliLiter(s) (70 mL/Hr) IV Continuous <Continuous>  donepezil 5 milliGRAM(s) Oral at bedtime  enoxaparin Injectable 40 milliGRAM(s) SubCutaneous every 24 hours  guaiFENesin Oral Liquid (Sugar-Free) 200 milliGRAM(s) Oral every 6 hours  losartan 50 milliGRAM(s) Oral daily  pantoprazole    Tablet 40 milliGRAM(s) Oral before breakfast  potassium phosphate IVPB 30 milliMole(s) IV Intermittent once  predniSONE   Tablet 60 milliGRAM(s) Oral daily  risperiDONE   Tablet 0.5 milliGRAM(s) Oral two times a day  senna 2 Tablet(s) Oral at bedtime  sodium chloride 3%  Inhalation 4 milliLiter(s) Inhalation every 6 hours  trimethoprim/polymyxin Solution 1 Drop(s) Both EYES every 6 hours  valACYclovir 1000 milliGRAM(s) Oral every 8 hours    MEDICATIONS  (PRN):  acetaminophen     Tablet .. 650 milliGRAM(s) Oral every 6 hours PRN Temp greater or equal to 38C (100.4F), Mild Pain (1 - 3)  sodium chloride 0.65% Nasal 1 Spray(s) Both Nostrils two times a day PRN Nasal Congestion    Pertinent Labs: -16 Na144 mmol/L Glu 96 mg/dL K+ 3.4 mmol/L<L> Cr  0.88 mg/dL BUN 21 mg/dL<H>  Phos 2.0 mg/dL<L> 15 Alb 2.4 g/dL<L>     CAPILLARY BLOOD GLUCOSE          Skin:     Estimated Needs:   [ ] no change since previous assessment  [ ] recalculated:     Previous Nutrition Diagnosis:   [ ] Inadequate Energy Intake [ ]Inadequate Oral Intake [ ] Excessive Energy Intake   [ ] Underweight [ ] Increased Nutrient Needs [ ] Overweight/Obesity  [ ] Swallowing Difficult   [ ] Altered GI Function [ ] Unintended Weight Loss [ ] Food & Nutrition Related Knowledge Deficit [ ] Malnutrition   [ ] Not Ready for Diet/Life Style Changes     Nutrition Diagnosis is [ ] ongoing  [ ] Improving   [ ] resolved [ ] not applicable     New Nutrition Diagnosis: [ ] not applicable       Interventions:   Recommend  [ ] Change Diet To:  [ ] Nutrition Supplement  [ ] Nutrition Support  [ ] Other:     Monitoring and Evaluation:   [ ] PO intake [ x ] Tolerance to diet prescription [ x ] weights [ x ] labs[ x ] follow up per protocol  [ ] other: Assessment:      Nutrition reassessment for follow-up NPO status. Chart reviewed, pt visited, confused, lethargy, s/p Swallow bedside and VFSS/MBS Assessment 11/15/23 with NPO recommended; NPO x 4 to 5d in-house.        s/p Palliative consult: "Moderate protein calorie malnutrition/ 2nd degree In context of Chronic Illness (>1 month)--advanced dementia, as evidenced by Energy/Food intake <75% of estimated energy requirement >7 days   Weight loss: Mild  (lbs lost recently)   Body Fat loss: Mild  scant temporal wasting  Muscle mass loss: Mild  albumin 2.5   Strength: weakened mild to moderate, patient mostly bedbound" per MD from Chart     Factors impacting intake: [ x ] change in mental status; difficulty chewing; difficulty feeding self; difficulty swallowing; difficulty with food procurement/preparation; Restoration/ethnic/cultural/personal food preferences; acute on chronic comorbidities including CVA, dementia    Diet Prescription:   Diet, NPO:   Except Medications  With Ice Chips/Sips of Water (23 @ 15:31)    Daily Weight in k.5 (2023 05:04)  Weight in k.3 (2023 05:13)    Pertinent Medications: MEDICATIONS  (STANDING):  albuterol/ipratropium for Nebulization 3 milliLiter(s) Nebulizer every 6 hours  allopurinol 300 milliGRAM(s) Oral daily  dextrose 5% + sodium chloride 0.9%. 1000 milliLiter(s) (70 mL/Hr) IV Continuous <Continuous>  donepezil 5 milliGRAM(s) Oral at bedtime  enoxaparin Injectable 40 milliGRAM(s) SubCutaneous every 24 hours  guaiFENesin Oral Liquid (Sugar-Free) 200 milliGRAM(s) Oral every 6 hours  losartan 50 milliGRAM(s) Oral daily  pantoprazole    Tablet 40 milliGRAM(s) Oral before breakfast  potassium phosphate IVPB 30 milliMole(s) IV Intermittent once  predniSONE   Tablet 60 milliGRAM(s) Oral daily  risperiDONE   Tablet 0.5 milliGRAM(s) Oral two times a day  senna 2 Tablet(s) Oral at bedtime  sodium chloride 3%  Inhalation 4 milliLiter(s) Inhalation every 6 hours  trimethoprim/polymyxin Solution 1 Drop(s) Both EYES every 6 hours  valACYclovir 1000 milliGRAM(s) Oral every 8 hours    MEDICATIONS  (PRN):  acetaminophen     Tablet .. 650 milliGRAM(s) Oral every 6 hours PRN Temp greater or equal to 38C (100.4F), Mild Pain (1 - 3)  sodium chloride 0.65% Nasal 1 Spray(s) Both Nostrils two times a day PRN Nasal Congestion    Pertinent Labs: 11-16 Na144 mmol/L Glu 96 mg/dL K+ 3.4 mmol/L<L> Cr  0.88 mg/dL BUN 21 mg/dL<H> 11-16 Phos 2.0 mg/dL<L> 11-15 Alb 2.4 g/dL<L>     CAPILLARY BLOOD GLUCOSE    Skin: Pressure Injury: stage I, II     Estimated Needs:   [ x ] no change since previous assessment  [ ] recalculated:     Previous Nutrition Diagnosis:   [ x ] Swallowing Difficult     Nutrition Diagnosis is [ x  ] ongoing  [ ] Improving   [ ] resolved [ ] not applicable     New Nutrition Diagnosis:   [ x ] Malnutrition ( Moderate Malnutrition in context of chronic illness) related to inadequate intake with acute on chronic comorbidities including CVA, dementia as evidenced by Moderate Malnutrition per MD with intake >75% needs x >7d, wt loss, mild muscle loss, mild fat loss, debility     Interventions/Recommend  [ x ] Advance diet or consider alternate nutrition support if NPO prolonged further as medically feasible/if consistent with Goal of Care   [ ] Nutrition Supplement  [ ] Nutrition Support  [ x ] Other: Discussed with MD/RN/SLP                      Monitoring and Evaluation:   [ ] PO intake [ x ] Tolerance to diet prescription [ x ] weights [ x ] labs[ x ] follow up per protocol  [ ] other:

## 2023-11-16 NOTE — PROGRESS NOTE ADULT - PROBLEM SELECTOR PLAN 1
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  At baseline, patient is essentially bedbound, minimally verbal, and total care in ADLs.  He is FAST stage 7B to 7C with a PPS score of 20%, now with worsening dysphagia.  Patient is hospice appropriate with likely prognosis of months.    Continue Aricept 5 mg QHS and risperidone BID  Continue supportive care.  Plan for additional GOC conversations--see below

## 2023-11-16 NOTE — PROGRESS NOTE ADULT - SUBJECTIVE AND OBJECTIVE BOX
PULMONARY SERVICE FOLLOW-UP NOTE    Pt appears delirious, trying to get off the bed. He appears agitated but no respiratory distress    REVIEW OF SYSTEMS (limited by patient participation in interview):  See above    MEDICATIONS:  MEDICATIONS  (STANDING):  albuterol/ipratropium for Nebulization 3 milliLiter(s) Nebulizer every 6 hours  allopurinol 300 milliGRAM(s) Oral daily  dextrose 5% + sodium chloride 0.9%. 1000 milliLiter(s) (75 mL/Hr) IV Continuous <Continuous>  donepezil 5 milliGRAM(s) Oral at bedtime  enoxaparin Injectable 40 milliGRAM(s) SubCutaneous every 24 hours  guaiFENesin Oral Liquid (Sugar-Free) 200 milliGRAM(s) Oral every 6 hours  losartan 50 milliGRAM(s) Oral daily  pantoprazole    Tablet 40 milliGRAM(s) Oral before breakfast  predniSONE   Tablet 60 milliGRAM(s) Oral daily  risperiDONE   Tablet 0.5 milliGRAM(s) Oral two times a day  senna 2 Tablet(s) Oral at bedtime  sodium chloride 3%  Inhalation 4 milliLiter(s) Inhalation every 6 hours  trimethoprim/polymyxin Solution 1 Drop(s) Both EYES every 6 hours  valACYclovir 1000 milliGRAM(s) Oral every 8 hours    MEDICATIONS  (PRN):  acetaminophen     Tablet .. 650 milliGRAM(s) Oral every 6 hours PRN Temp greater or equal to 38C (100.4F), Mild Pain (1 - 3)  sodium chloride 0.65% Nasal 1 Spray(s) Both Nostrils two times a day PRN Nasal Congestion      Allergies    No Known Allergies    Intolerances    Vital Signs Last 24 Hrs  Vital Signs Last 24 Hrs  T(C): 36.8 (16 Nov 2023 16:36), Max: 37.1 (16 Nov 2023 07:43)  T(F): 98.2 (16 Nov 2023 16:36), Max: 98.7 (16 Nov 2023 07:43)  HR: 89 (16 Nov 2023 16:36) (65 - 89)  BP: 154/78 (16 Nov 2023 16:36) (135/65 - 163/83)  BP(mean): --  RR: 18 (16 Nov 2023 16:36) (18 - 18)  SpO2: 94% (16 Nov 2023 16:36) (94% - 99%)    Parameters below as of 16 Nov 2023 16:36  Patient On (Oxygen Delivery Method): room air          PHYSICAL EXAM:  Constitutional: non-toxic elderly man resting in bed; NAD  Head: atraumatic  EENT: conjunctival injection b/l w/ right > left eyelid fullness, right droop; oropharynx clear, MMM, ?lip swelling  Neck: supple, no appreciable JVD  Respiratory: lungs CTA with good aeration to both bases; respirations appear non-labored  Cardiovascular: +S1/S2, RRR  Gastrointestinal: soft, NT/ND  Extremities: WWP; no edema, clubbing or cyanosis   Vascular: 2+ radial pulses B/L  Neurological: awake, somewhat constricted affect; right facial droop (per primary team, finding has been noted already- full strength testing deferred, pt undergoing CVA eval since admission)    LABS:                                                12.3   5.47  )-----------( 206      ( 16 Nov 2023 05:29 )             37.0   11-16    144  |  114<H>  |  21<H>  ----------------------------<  96  3.4<L>   |  25  |  0.88    Ca    8.9      16 Nov 2023 05:29  Phos  2.0     11-16  Mg     2.5     11-16    TPro  7.1  /  Alb  2.4<L>  /  TBili  0.6  /  DBili  x   /  AST  24  /  ALT  26  /  AlkPhos  63  11-15          RADIOLOGY & ADDITIONAL STUDIES (personally reviewed):  < from: Xray Chest 1 View- PORTABLE-Urgent (11.11.23 @ 01:39) >  IMPRESSION: Clear lungs.

## 2023-11-16 NOTE — PROGRESS NOTE ADULT - PROBLEM SELECTOR PLAN 5
Clinical evidence indicates that the patient has Moderate protein calorie malnutrition/ 2nd degree  In context of Chronic Illness (>1 month)--advanced dementia, as evidenced by    Energy/Food intake <75% of estimated energy requirement >7 days  Weight loss: Mild  (lbs lost recently)  Body Fat loss: Mild  scant temporal wasting  Muscle mass loss: Mild  albumin 2.4   Strength: weakened mild to moderate, patient mostly bedbound    Recommend:   Continue NPO for now, pending resolution of acute viral infection and ongoing assessment by SLP--strict aspiration precautions, keep head of bed at least 45 degrees during feeding    Son undecided re:  comfort hand feeding vs PEG--see previous GOC discussion

## 2023-11-16 NOTE — DIETITIAN NUTRITION RISK NOTIFICATION - TREATMENT: THE FOLLOWING DIET HAS BEEN RECOMMENDED
Diet, NPO:   Except Medications  With Ice Chips/Sips of Water (11-12-23 @ 15:31) [Active]  Advance diet or consider alternate nutrition support if NPO prolonged further as medically feasible/if consistent with Goal of Care

## 2023-11-16 NOTE — PROGRESS NOTE ADULT - ASSESSMENT
83 y.o. M with PMHx of HTN and advanced dementia, with wife (primary caretaker) dying approx 1 week ago, who presented to [facility] ER late evening 11/10 with generalized weakness and shortness of breath. In ER found to have + RVP for non-COVID coronavirus.  Patient admitted for sepsis 2/2 AHRF with acute viral URI, and ambulatory dysfunction.  Also noted to have bilateral conjunctivitis.  Received IV Rocephin x 1 dose.  On day after admission (11/12), patient was noted to have a right sided facial droop but no other definitive neuro symptoms, ? possible CVA vs Bell's palsy.  Neurology consulted, CT and MRI of brain both negative for infarct, bleed, or acute intracranial disease (MRI limited by motion artifact), started on oral prednisone and antiviral tx with acyclovir (now being changed to Valtrex).  Hospital course further complicated by severe pulmonary congestion with copious secretions requiring frequent suctioning, and ? new finding of severe oropharyngeal dysphagia, currently remains NPO.

## 2023-11-16 NOTE — DIETITIAN NUTRITION RISK NOTIFICATION - ADDITIONAL COMMENTS/DIETITIAN RECOMMENDATIONS
Malnutrition Diagnosis Parameters: Moderate Malnutrition per MD with intake >75% needs x >7d, wt loss, mild muscle loss, mild fat loss, debility

## 2023-11-16 NOTE — PROGRESS NOTE ADULT - PROBLEM SELECTOR PLAN 4
? 2/2 Bell's Palsy vs possible CVA, CT/MRI of head negative  Neuro input appreciated.    Continue antiviral tx with valacyclovir and Prednisone.

## 2023-11-17 NOTE — PROGRESS NOTE ADULT - PROBLEM SELECTOR PLAN 3
RVP + coronavirus (not COVID 19)  Chest Xray; no consolidation  received 2.4L ns bolus  blood cultures and urine cultures negative  ua negative  c/w po guaifenesin   tylenol prn  Pulm rec:   - maintain strict aspirations at all times, keep head of bed elevated ~45 deg  - agree w/ SLP eval and NPO for now  Supportive care

## 2023-11-17 NOTE — PROGRESS NOTE ADULT - PROBLEM SELECTOR PLAN 2
p/w fever, HR >90, hypotension, lactate 1.9  patient is afebrile and normotensive does not meet SIRS criteria.  RVP + coronavirus (not COVID 19)  Had +upper airway sounds, improved after nasal suction  on 3L NC saturating at 99% weaned off oxygen today.   home med: trelegy 200/ 52.5/25 and albuterol 90mcg  c/w duonebs q6 and hypertonic saline 3% inhalation w/ duonebs  Chest PT with chest vest q6  switched mucinex to robitussin  oropharyngeal suctioning TID

## 2023-11-17 NOTE — PROGRESS NOTE ADULT - ATTENDING COMMENTS
This is a late entry, patient was seen and examined earlier in the afternoon of 11/17/23     83 y.o. M with PMHx of HTN, dementia- aaox 0-1, ambulates w/. walker,  presenting with generalized weakness and shortness of breath. Admitted for sepsis and AHRF 2/2 corona virus. During hospital stay, pt was noted w/ a subtle facial droop arising possibility of acute stroke vs Bell's palsy, hence underwent MR head which was negative.     Pt was seen and examined, was aao x1, remains confused     PE as above    Labs reviewed- cbc, bmp, Mg, PO4     A/P:  #Viral bronchitis   #Aspiration/mucus plugging   #Oropharyngeal dysfunction   #Bell's palsy   #Acute conjunctivitis   #Acute Hypoxic Respiratory failure 2/2 Corona virus- non-covid - resolved   #Dementia   #Advance care planning     Plan:  -C/w conservative measures, needs frequent suctioning. C/w Duoneb, 3% saline inhalation. Chest PT   -Maintain strict aspiration precautions   -C/w valacyclovir and prednisone for bell's palsy.   -Patient's current oropharyngeal dysfunction could be 2/2 Bell's palsy vs acute illnesses. Failed SnS eval. FEES eval noted. Palliative care following, d/w Dr. Kenny who had detailed discussion w/ patient's son and daughter, opted for comfort measures and eventual dispo to LTC w/ hospice.   -c/w IVF while NPO, poct q 6 hrs.   -c/w losartan.   -DNR/DNI.  -SW consult for dispo planning

## 2023-11-17 NOTE — PROGRESS NOTE ADULT - SUBJECTIVE AND OBJECTIVE BOX
follow up on:  complex medical decision making related to goals of care    [facility] Geriatric and Palliative Consult Service:  Damaris Brown DO: cell ([phone])  Larry Kenny MD: cell ([phone])  Nicholas Alvarado NP: cell ([phone])   Bryan Tirado LMSW: cell ([phone])   Agatha Guerin NP: via [employer] Teams    Can contact any Palliative Team member via [employer] Teams for consults and questions      OVERNIGHT EVENTS:    Present Symptoms: Mild, Moderate, Severe  Pain:             Location -                               Aggravating factors -             Quality -             Radiation -             Timing-             Severity (0-10 scale):             Minimal acceptable level (0-10 scale):  Fatigue:  Nausea:  Lack of Appetite:   SOB:  Depression:  Anxiety:  Review of Systems: [All others negative or Unable to obtain due to poor mentation]    CPOT:    https://ccs-st.org/resources/Documents/Sedation%20Analgesia%20Delirium%20in%20CC/CCS%20STH%20Guideline%20template%20CPOT.pdf  PAIN AD Score:   http://[email or web address]/painad.pdf (press ctrl +  left click to view)MEDICATIONS  (STANDING):  albuterol/ipratropium for Nebulization 3 milliLiter(s) Nebulizer every 6 hours  allopurinol 300 milliGRAM(s) Oral daily  dextrose 5% + sodium chloride 0.9%. 1000 milliLiter(s) (75 mL/Hr) IV Continuous <Continuous>  donepezil 5 milliGRAM(s) Oral at bedtime  enoxaparin Injectable 40 milliGRAM(s) SubCutaneous every 24 hours  guaiFENesin Oral Liquid (Sugar-Free) 200 milliGRAM(s) Oral every 6 hours  lactated ringers. 1000 milliLiter(s) (70 mL/Hr) IV Continuous <Continuous>  losartan 50 milliGRAM(s) Oral daily  pantoprazole    Tablet 40 milliGRAM(s) Oral before breakfast  predniSONE   Tablet 60 milliGRAM(s) Oral daily  risperiDONE   Tablet 0.5 milliGRAM(s) Oral two times a day  senna 2 Tablet(s) Oral at bedtime  sodium chloride 3%  Inhalation 4 milliLiter(s) Inhalation every 6 hours  trimethoprim/polymyxin Solution 1 Drop(s) Both EYES every 6 hours  valACYclovir 1000 milliGRAM(s) Oral every 8 hours    MEDICATIONS  (PRN):  acetaminophen     Tablet .. 650 milliGRAM(s) Oral every 6 hours PRN Temp greater or equal to 38C (100.4F), Mild Pain (1 - 3)  sodium chloride 0.65% Nasal 1 Spray(s) Both Nostrils two times a day PRN Nasal Congestion      PHYSICAL EXAM:  Vital Signs Last 24 Hrs  T(C): 36.1 (17 Nov 2023 22:47), Max: 36.9 (17 Nov 2023 11:40)  T(F): 96.9 (17 Nov 2023 22:47), Max: 98.4 (17 Nov 2023 11:40)  HR: 67 (17 Nov 2023 22:47) (67 - 85)  BP: 151/76 (17 Nov 2023 22:47) (116/77 - 154/78)  BP(mean): --  RR: 18 (17 Nov 2023 22:47) (17 - 19)  SpO2: 96% (17 Nov 2023 22:47) (93% - 100%)    Parameters below as of 17 Nov 2023 22:47  Patient On (Oxygen Delivery Method): room air        General: alert  oriented x ____    lethargic distressed cachexia  verbal nonverbal  unarousable     Palliative Performance Scale/Karnofsky Score:  http://npcrc.org/files/news/palliative_performance_scale_ppsv2.pdf    HEENT: no abnormal lesion, dry mouth  ET tube/trach oral lesions:  Lungs: tachypnea/labored breathing, audible excessive secretions  CV: RRR, S1S2, tachycardia  GI: soft non distended non tender  incontinent               PEG/NG/OG tube  constipation  last BM:   : incontinent  oliguria/anuria  manzanares  Musculoskeletal: weakness x4 edema x4    ambulatory with assistance   mostly/fully bedbound/wheelchair bound  Skin: no abnormal skin lesions, poor skin turgor, pressure ulcers stage:   Neuro: no deficits, mild cognitive impairment dsyphagia/dysarthria paresis  Oral intake ability: unable/only mouth care, minimal moderate full capability    LABS:                          13.1   4.92  )-----------( 223      ( 17 Nov 2023 05:40 )             40.5     11-17    144  |  115<H>  |  22<H>  ----------------------------<  97  3.4<L>   |  24  |  0.90    Ca    9.0      17 Nov 2023 05:40  Phos  2.4     11-17  Mg     2.4     11-17    TPro  7.4  /  Alb  2.6<L>  /  TBili  0.5  /  DBili  x   /  AST  20  /  ALT  25  /  AlkPhos  64  11-17    Urinalysis Basic - ( 17 Nov 2023 05:40 )    Color: x / Appearance: x / SG: x / pH: x  Gluc: 97 mg/dL / Ketone: x  / Bili: x / Urobili: x   Blood: x / Protein: x / Nitrite: x   Leuk Esterase: x / RBC: x / WBC x   Sq Epi: x / Non Sq Epi: x / Bacteria: x        RADIOLOGY & ADDITIONAL STUDIES: follow up on:  complex medical decision making related to goals of care    [facility] Geriatric and Palliative Consult Service:  Damaris Brown DO: cell ([phone])  Larry Kenny MD: cell ([phone])  Nicholas Alvarado NP: cell ([phone])   Bryan Tirado LMSW: cell ([phone])   Agatha Guerin NP: via [employer] Teams    Can contact any Palliative Team member via [employer] Teams for consults and questions      OVERNIGHT EVENTS:  None    Patient examined at bedside this afternoon.  History obtained with use of Cantonese speaking  via video interpretation service (ID# [id number], Su).  Patient alert and oriented x 1, still confused, but overall much more responsive.  Denies pain, otherwise limited ability to answer questions, can follow commands (opens eyes, wiggles toes).  Appears comfortable.  No other acute issues reported by bedside nursing staff.    Present Symptoms: Mild, Moderate, Severe  Pain:  Denies, 0/10    Review of Systems:  Otherwise unable to obtain due to poor mentation/dementia    CPOT:  0  https://ccs-st.org/resources/Documents/Sedation%20Analgesia%20Delirium%20in%20CC/CCS%20STH%20Guideline%20template%20CPOT.pdf  PAIN AD Score:  0  [email or web address].[place].[email or web address]/painad.pdf (press ctrl +  left click to view)    MEDICATIONS  (STANDING):  albuterol/ipratropium for Nebulization 3 milliLiter(s) Nebulizer every 6 hours  allopurinol 300 milliGRAM(s) Oral daily  dextrose 5% + sodium chloride 0.9%. 1000 milliLiter(s) (75 mL/Hr) IV Continuous <Continuous>  donepezil 5 milliGRAM(s) Oral at bedtime  enoxaparin Injectable 40 milliGRAM(s) SubCutaneous every 24 hours  guaiFENesin Oral Liquid (Sugar-Free) 200 milliGRAM(s) Oral every 6 hours  lactated ringers. 1000 milliLiter(s) (70 mL/Hr) IV Continuous <Continuous>  losartan 50 milliGRAM(s) Oral daily  pantoprazole    Tablet 40 milliGRAM(s) Oral before breakfast  predniSONE   Tablet 60 milliGRAM(s) Oral daily  risperiDONE   Tablet 0.5 milliGRAM(s) Oral two times a day  senna 2 Tablet(s) Oral at bedtime  sodium chloride 3%  Inhalation 4 milliLiter(s) Inhalation every 6 hours  trimethoprim/polymyxin Solution 1 Drop(s) Both EYES every 6 hours  valACYclovir 1000 milliGRAM(s) Oral every 8 hours    MEDICATIONS  (PRN):  acetaminophen     Tablet .. 650 milliGRAM(s) Oral every 6 hours PRN Temp greater or equal to 38C (100.4F), Mild Pain (1 - 3)  sodium chloride 0.65% Nasal 1 Spray(s) Both Nostrils two times a day PRN Nasal Congestion      PHYSICAL EXAM:  Vital Signs Last 24 Hrs  T(C): 36.1 (17 Nov 2023 22:47), Max: 36.9 (17 Nov 2023 11:40)  T(F): 96.9 (17 Nov 2023 22:47), Max: 98.4 (17 Nov 2023 11:40)  HR: 67 (17 Nov 2023 22:47) (67 - 85)  BP: 151/76 (17 Nov 2023 22:47) (116/77 - 154/78)  BP(mean): --  RR: 18 (17 Nov 2023 22:47) (17 - 19)  SpO2: 96% (17 Nov 2023 22:47) (93% - 100%)    Parameters below as of 17 Nov 2023 22:47  Patient On (Oxygen Delivery Method): room air        General: alert  oriented x __1__     to name only, alert, responsive, scant temporal wasting, NAD    Palliative Performance Scale/Karnofsky Score:  20%  http://npcrc.org/files/news/palliative_performance_scale_ppsv2.pdf    HEENT: EOMI, anicteric, conjunctival injection improved, pharynx clear, MM moist  Lungs:  clear to auscultation, minimal congestion noted, respirations unlabored  CV: RRR, normal S1 and S2, no murmur  GI: soft non distended non tender  + bowel sounds  : incontinent    Musculoskeletal: weakness x4  in all extremities, essentially bedbound, no edema noted  Skin: no abnormal skin lesions or DU noted  Neuro:  minimal R facial droop appreciated, + severe cognitive impairment, + dysphagia  Oral intake ability: unable/mouth care only, remains NPO      LABS:                          13.1   4.92  )-----------( 223      ( 17 Nov 2023 05:40 )             40.5     11-17    144  |  115<H>  |  22<H>  ----------------------------<  97  3.4<L>   |  24  |  0.90    Ca    9.0      17 Nov 2023 05:40  Phos  2.4     11-17  Mg     2.4     11-17    TPro  7.4  /  Alb  2.6<L>  /  TBili  0.5  /  DBili  x   /  AST  20  /  ALT  25  /  AlkPhos  64  11-17    Urinalysis Basic - ( 17 Nov 2023 05:40 )    Color: x / Appearance: x / SG: x / pH: x  Gluc: 97 mg/dL / Ketone: x  / Bili: x / Urobili: x   Blood: x / Protein: x / Nitrite: x   Leuk Esterase: x / RBC: x / WBC x   Sq Epi: x / Non Sq Epi: x / Bacteria: x        RADIOLOGY & ADDITIONAL STUDIES:

## 2023-11-17 NOTE — PROGRESS NOTE ADULT - PROBLEM SELECTOR PLAN 2
Due to severe viral URI/bronchitis and non-COVID coronavirus infection, with significant pulmonary congestion.  Congestion much improved over last 2-3 days  Pulmonary input appreciated, continue Duonebs and saline nebs  Otherwise continue management as per primary medical team.

## 2023-11-17 NOTE — PROGRESS NOTE ADULT - PROBLEM SELECTOR PLAN 1
pt has a right sided facial droop with right eyelid droop.  family mentions noticing only the right eyelid droop on 11/10/23  difficulty swallowing liquids and solids  speech and swallow consulted-->failed FEES assessment  NPO with crushed meds  CT/MR head: No acute infarct, hemorrhage, or mass identified   Neuro (Dr. Humphries) consulted  possible bells palsy  start prednisone 60mg qd and valcyclovir 1g q8  Palliative consulted re possible PEG--> DNR/DNI  s/p modified barium swallow--> aspiration-->keep NPO-->f/u report pt has a right sided facial droop with right eyelid droop.  family mentions noticing only the right eyelid droop on 11/10/23  difficulty swallowing liquids and solids  speech and swallow consulted-->failed FEES assessment  NPO with crushed meds  CT/MR head: No acute infarct, hemorrhage, or mass identified   Neuro (Dr. Humphries) consulted  possible bells palsy  start prednisone 60mg qd and valcyclovir 1g q8  Palliative consulted re possible PEG--> DNR/DNI--> comfort measures as of 11/17  s/p modified barium swallow--> aspiration-->keep NPO-->see report

## 2023-11-17 NOTE — PROGRESS NOTE ADULT - PROBLEM SELECTOR PLAN 3
SLP input appreciated--FEES with evidence of silent laryngeal penetration and aspiration with multiple consistences, currently remains NPO.  Unclear if dysphagia temporary due to concurrent AHRF/coronavirus infection, at significant risk of evolving into long term dysphagia    MBS from 11/15 confirmed moderate to severe oropharyngeal discussion--per my discussion with SLP, likely 2/2 progressive dementia, unlikely to resolve over the short term    Continue NPO status for now with strict aspiration precautions  Ongoing f/u by SLP  See GOC discussion from above--family now in agreement with hand feedings for comfort as tolerated, no PEG, understand risk of aspiration with such.   Discussed with Dr. Thomson, consider changing to pureed diet with moderately thickened liquids for comfort feeding as tolerated

## 2023-11-17 NOTE — PROGRESS NOTE ADULT - CONVERSATION DETAILS
Telephonic family meeting held with patient's son and daughter x 25 minutes (as separately identifiable service) to discuss prognosis, ACP, goals of care, and treatment preferences.  Son and daughter unable to speak in person as they had just finished attending their mother's .  Again counseled son and daughter that patient has advanced dementia, with further accelerated decline expected as a result of this hospitalization.  Also discussed that patient has severe dysphagia likely related to his underlying dementia which is unlikely to improve significantly (even though his URI and respiratory failure are much improved).  Advised family that patient is hospice appropriate with likely prognosis of months, regardless of future treatment or intervention.  Discussed risks/burdens/benefits of careful hand feeding for comfort vs long term PEG.  Family counseled that PEG insertion will not reverse disease trajectory from patient's underlying dementia, will not significantly improve survival, and will not eliminate the risk of aspiration.  Son and daughter are now clear that they just want the patient to be comfortable and that he would not want to be hooked up to any tubes or sustained by artificial measures.  Family agreed to comfort feeds as tolerated and no PEG, understand risk of aspiration with such.  They also are in agreement with LTC placement with hospice.    [facility] orders updated, see below. Family in agreement with transition to comfort measures only, no further blood draws, no further imaging, no escalation of care.  Son and daughter were appreciative of the conversation, and all of their questions were answered
MrMarcia and Mrs. Romero (Son and Daugther in law of patient) mentions the patient would like full life saving manoeuvres. In a situation where Nick Lucero Juanito's heart stops beating he would like CPR and in a situation where Nick can not breath/ventilate/ protect his own airway he would like to be intubated with mechanical ventilation.  Family believes Mr. Romero wishes for FULL CODE.

## 2023-11-17 NOTE — PROGRESS NOTE ADULT - ASSESSMENT
84yo man trivial distant smoking hx w/ PMH dementia presented with weakness, dyspnea and cough; found with +coronavirus (non-COVID) and also being worked up for CVA with presentation suggestive of viral bronchitis possibly complicated by aspiration/impaired airway clearance. Chest imaging without consolidation, and with increased mucus production possibly with impaired clearance, suggestive of bronchitis - unknown baseline and primary team also working up CVA.     #Viral bronchitis  #Coronavirus (non-COVID) infection  #Aspiration/mucus plugging    Recommendations:  - conservative mgmt for coronavirus infection  - airway clearance regimen as follows:  --> duoneb  q6h standing   --> sodium chloride 3% neb q6h with albuterol/duoneb   --> can d/c chest vest  - suctioning PRN  - maintain strict aspirations at all times, keep head of bed elevated ~45 deg  - FEES with severe oropharyngeal dysfunction, remains NPO

## 2023-11-17 NOTE — PROGRESS NOTE ADULT - SUBJECTIVE AND OBJECTIVE BOX
PGY-1 Progress Note discussed with attending    PAGER #: [[phone]] TILL 5:00 PM  PLEASE CONTACT ON CALL TEAM:  - On Call Team (Please refer to Darion) FROM 5:00 PM - 8:30PM  - Nightfloat Team FROM 8:30 -7:30 AM    CC: Patient is a 83y old  Male who presents with a chief complaint of Sepsis (16 Nov 2023 18:28)      OVERNIGHT EVENTS:    SUBJECTIVE / INTERVAL HPI: Patient seen and examined at bedside.     ROS:  CONSTITUTIONAL: No weakness, fevers or chills  EYES/ENT: No visual changes;  No vertigo or throat pain   NECK: No pain or stiffness  RESPIRATORY: No cough, wheezing, hemoptysis; No shortness of breath  CARDIOVASCULAR: No chest pain or palpitations  GASTROINTESTINAL: No abdominal or epigastric pain. No nausea, vomiting, or hematemesis; No diarrhea or constipation. No melena or hematochezia.  GENITOURINARY: No dysuria, frequency or hematuria  NEUROLOGICAL: No numbness or weakness  SKIN: No itching, burning, rashes, or lesions     VITAL SIGNS:  Vital Signs Last 24 Hrs  T(C): 36.9 (17 Nov 2023 11:40), Max: 36.9 (17 Nov 2023 11:40)  T(F): 98.4 (17 Nov 2023 11:40), Max: 98.4 (17 Nov 2023 11:40)  HR: 70 (17 Nov 2023 11:40) (69 - 89)  BP: 135/67 (17 Nov 2023 11:40) (116/77 - 164/91)  BP(mean): --  RR: 17 (17 Nov 2023 11:40) (17 - 19)  SpO2: 94% (17 Nov 2023 11:40) (94% - 98%)    Parameters below as of 17 Nov 2023 11:40  Patient On (Oxygen Delivery Method): room air        PHYSICAL EXAM:    General: WDWN  HEENT: NC/AT; PERRL, anicteric sclera; MMM  Neck: supple  Cardiovascular: +S1/S2; RRR  Respiratory: CTA B/L; no W/R/R  Gastrointestinal: soft, NT/ND; +BSx4  Extremities: WWP; no edema, clubbing or cyanosis  Vascular: 2+ radial, DP/PT pulses B/L  Skin: Warm, dry, good turgor, no rashes, or ecchymoses  Neurological: AAOx3; no focal deficits    MEDICATIONS:  MEDICATIONS  (STANDING):  albuterol/ipratropium for Nebulization 3 milliLiter(s) Nebulizer every 6 hours  allopurinol 300 milliGRAM(s) Oral daily  dextrose 5% + sodium chloride 0.9%. 1000 milliLiter(s) (75 mL/Hr) IV Continuous <Continuous>  donepezil 5 milliGRAM(s) Oral at bedtime  enoxaparin Injectable 40 milliGRAM(s) SubCutaneous every 24 hours  guaiFENesin Oral Liquid (Sugar-Free) 200 milliGRAM(s) Oral every 6 hours  losartan 50 milliGRAM(s) Oral daily  pantoprazole    Tablet 40 milliGRAM(s) Oral before breakfast  predniSONE   Tablet 60 milliGRAM(s) Oral daily  risperiDONE   Tablet 0.5 milliGRAM(s) Oral two times a day  senna 2 Tablet(s) Oral at bedtime  sodium chloride 3%  Inhalation 4 milliLiter(s) Inhalation every 6 hours  trimethoprim/polymyxin Solution 1 Drop(s) Both EYES every 6 hours  valACYclovir 1000 milliGRAM(s) Oral every 8 hours    MEDICATIONS  (PRN):  acetaminophen     Tablet .. 650 milliGRAM(s) Oral every 6 hours PRN Temp greater or equal to 38C (100.4F), Mild Pain (1 - 3)  sodium chloride 0.65% Nasal 1 Spray(s) Both Nostrils two times a day PRN Nasal Congestion      ALLERGIES:  Allergies    No Known Allergies    Intolerances        LABS:                        13.1   4.92  )-----------( 223      ( 17 Nov 2023 05:40 )             40.5     11-17    144  |  115<H>  |  22<H>  ----------------------------<  97  3.4<L>   |  24  |  0.90    Ca    9.0      17 Nov 2023 05:40  Phos  2.4     11-17  Mg     2.4     11-17    TPro  7.4  /  Alb  2.6<L>  /  TBili  0.5  /  DBili  x   /  AST  20  /  ALT  25  /  AlkPhos  64  11-17      Urinalysis Basic - ( 17 Nov 2023 05:40 )    Color: x / Appearance: x / SG: x / pH: x  Gluc: 97 mg/dL / Ketone: x  / Bili: x / Urobili: x   Blood: x / Protein: x / Nitrite: x   Leuk Esterase: x / RBC: x / WBC x   Sq Epi: x / Non Sq Epi: x / Bacteria: x      CAPILLARY BLOOD GLUCOSE          RADIOLOGY & ADDITIONAL TESTS: Reviewed. PGY-1 Progress Note discussed with attending    PAGER #: [[phone]] TILL 5:00 PM  PLEASE CONTACT ON CALL TEAM:  - On Call Team (Please refer to Darion) FROM 5:00 PM - 8:30PM  - Nightfloat Team FROM 8:30 -7:30 AM    CC: Patient is a 83y old  Male who presents with a chief complaint of Sepsis (16 Nov 2023 18:28)      OVERNIGHT EVENTS:    SUBJECTIVE / INTERVAL HPI: Patient seen and examined at bedside. Sleeping comfortably. Later when awake more lucid and able to converse with nurse more intelligibly in native language. Denies pain or shortness of breath.      ROS: limited ROS due to mental status    VITAL SIGNS:  Vital Signs Last 24 Hrs  T(C): 36.9 (17 Nov 2023 11:40), Max: 36.9 (17 Nov 2023 11:40)  T(F): 98.4 (17 Nov 2023 11:40), Max: 98.4 (17 Nov 2023 11:40)  HR: 70 (17 Nov 2023 11:40) (69 - 89)  BP: 135/67 (17 Nov 2023 11:40) (116/77 - 164/91)  BP(mean): --  RR: 17 (17 Nov 2023 11:40) (17 - 19)  SpO2: 94% (17 Nov 2023 11:40) (94% - 98%)    Parameters below as of 17 Nov 2023 11:40  Patient On (Oxygen Delivery Method): room air        PHYSICAL EXAM:    HEENT: NC/AT; ?R pupil > L, reactive to light, anicteric sclera; dry mucous membranes  Neck: supple  Cardiovascular: +S1/S2; RRR  Respiratory: significantly improved mostly CTA B/L with diminished breath sounds at left base; no W/R/R  Gastrointestinal: soft, NT/ND; +BSx4  Extremities: WWP; no edema, clubbing or cyanosis  Vascular: 2+ radial, DP/PT pulses B/L  Skin: Warm, dry, good turgor, no rashes, or ecchymoses  Neurological: AAOx1; ptosis R eye, R lfacial droop, spastic facial muscles when instructed to smile, tongue fasciculations CN not able to participate in full neuro exam    MEDICATIONS:  MEDICATIONS  (STANDING):  albuterol/ipratropium for Nebulization 3 milliLiter(s) Nebulizer every 6 hours  allopurinol 300 milliGRAM(s) Oral daily  dextrose 5% + sodium chloride 0.9%. 1000 milliLiter(s) (75 mL/Hr) IV Continuous <Continuous>  donepezil 5 milliGRAM(s) Oral at bedtime  enoxaparin Injectable 40 milliGRAM(s) SubCutaneous every 24 hours  guaiFENesin Oral Liquid (Sugar-Free) 200 milliGRAM(s) Oral every 6 hours  losartan 50 milliGRAM(s) Oral daily  pantoprazole    Tablet 40 milliGRAM(s) Oral before breakfast  predniSONE   Tablet 60 milliGRAM(s) Oral daily  risperiDONE   Tablet 0.5 milliGRAM(s) Oral two times a day  senna 2 Tablet(s) Oral at bedtime  sodium chloride 3%  Inhalation 4 milliLiter(s) Inhalation every 6 hours  trimethoprim/polymyxin Solution 1 Drop(s) Both EYES every 6 hours  valACYclovir 1000 milliGRAM(s) Oral every 8 hours    MEDICATIONS  (PRN):  acetaminophen     Tablet .. 650 milliGRAM(s) Oral every 6 hours PRN Temp greater or equal to 38C (100.4F), Mild Pain (1 - 3)  sodium chloride 0.65% Nasal 1 Spray(s) Both Nostrils two times a day PRN Nasal Congestion      ALLERGIES:  Allergies    No Known Allergies    Intolerances        LABS:                        13.1   4.92  )-----------( 223      ( 17 Nov 2023 05:40 )             40.5     11-17    144  |  115<H>  |  22<H>  ----------------------------<  97  3.4<L>   |  24  |  0.90    Ca    9.0      17 Nov 2023 05:40  Phos  2.4     11-17  Mg     2.4     11-17    TPro  7.4  /  Alb  2.6<L>  /  TBili  0.5  /  DBili  x   /  AST  20  /  ALT  25  /  AlkPhos  64  11-17      Urinalysis Basic - ( 17 Nov 2023 05:40 )    Color: x / Appearance: x / SG: x / pH: x  Gluc: 97 mg/dL / Ketone: x  / Bili: x / Urobili: x   Blood: x / Protein: x / Nitrite: x   Leuk Esterase: x / RBC: x / WBC x   Sq Epi: x / Non Sq Epi: x / Bacteria: x      CAPILLARY BLOOD GLUCOSE          RADIOLOGY & ADDITIONAL TESTS: Reviewed.

## 2023-11-17 NOTE — PROGRESS NOTE ADULT - PROBLEM SELECTOR PLAN 5
Clinical evidence indicates that the patient has Moderate protein calorie malnutrition/ 2nd degree  In context of Chronic Illness (>1 month)--advanced dementia, as evidenced by    Energy/Food intake <75% of estimated energy requirement >7 days  Weight loss: Mild  (lbs lost recently)  Body Fat loss: Mild  scant temporal wasting  Muscle mass loss: Mild  albumin 2.6   Strength: weakened mild to moderate, patient mostly bedbound    Recommend:   Continue NPO for now, family now in agreement with comfort feeds  Consider upgrading to pureed diet w/thickened liquids as tolerated--strict aspiration precautions, keep head of bed at least 45 degrees during feeding    Per family, no PEG tube

## 2023-11-17 NOTE — PROGRESS NOTE ADULT - PROBLEM SELECTOR PLAN 1
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  At baseline, patient is essentially bedbound, minimally verbal, and total care in ADLs.  He is FAST stage 7B to 7C with a PPS score of 20%, now with worsening dysphagia.  Patient is hospice appropriate with likely prognosis of months.    Continue Aricept 5 mg QHS and risperidone BID  Continue supportive care.  See GOC discussion above--family now in agreement with DNR, DNI, DNH, no PEG, transition to comfort measures only, and LTC placement with hospice.

## 2023-11-17 NOTE — PROGRESS NOTE ADULT - TREATMENT GUIDELINES
DNI/DNR Order/Comfort measures only/Do not re-hospitalize/No blood draws/No artificial nutrition/Antibiotic trial/IV fluid trial
FULL CODE

## 2023-11-17 NOTE — PROGRESS NOTE ADULT - ASSESSMENT
83 y.o. M with PMHx of HTN and advanced dementia, with wife (primary caretaker) dying approx 1 week ago, who presented to [facility] ER late evening 11/10 with generalized weakness and shortness of breath. In ER found to have + RVP for non-COVID coronavirus.  Patient admitted for sepsis 2/2 AHRF with acute viral URI, and ambulatory dysfunction.  Also noted to have bilateral conjunctivitis.  Received IV Rocephin x 1 dose.  On day after admission (11/12), patient was noted to have a right sided facial droop but no other definitive neuro symptoms, ? possible CVA vs Bell's palsy.  Neurology consulted, CT and MRI of brain both negative for infarct, bleed, or acute intracranial disease (MRI limited by motion artifact), started on oral prednisone and antiviral tx with acyclovir (now being changed to Valtrex).  Hospital course further complicated by severe pulmonary congestion with copious secretions requiring frequent suctioning, and ? new finding of severe oropharyngeal dysphagia, currently remains NPO.

## 2023-11-18 NOTE — PROGRESS NOTE ADULT - ASSESSMENT
83 y.o. M with PMHx of HTN, dementia- aaox 0-1, ambulates w/. walker,  presenting with generalized weakness and shortness of breath. Admitted for sepsis and AHRF 2/2 corona virus. During hospital stay, pt was noted w/ a subtle facial droop arising possibility of acute stroke vs Bell's palsy, hence underwent MR head which was negative. He was treated for [place] palsy. Patient continued to have oropharyngeal dysfunction and failed SnS and FEES, after length discussion w/ family- patient is for comfort measures and DC to LTC w/ hospice.     A/P:  #Oropharyngeal dysfunction   #Viral bronchitis   #Aspiration/mucus plugging   #Bell's palsy   #Acute conjunctivitis   #Acute Hypoxic Respiratory failure 2/2 Corona virus- non-covid - resolved   #Dementia   #Advance care planning     Plan:  -C/w conservative measures, needs frequent suctioning. C/w Duoneb, 3% saline inhalation. Chest PT   -Maintain strict aspiration precautions   -C/w valacyclovir and prednisone for bell's palsy.   -Patient's current oropharyngeal dysfunction could be 2/2 Bell's palsy vs acute illnesses. failed SnS, FEES eval noted. After length discussion of palliative care team w/ family, patient is for DC to LTC w/ hospice. MEWS exempt and comfort measures. Will start diet as pleasure feeds only.   -c/w losartan   -DNR/DNI.   -CM/SW to follow for dispo

## 2023-11-18 NOTE — PROGRESS NOTE ADULT - SUBJECTIVE AND OBJECTIVE BOX
Patient is a 83y old  Male who presents with a chief complaint of Sepsis (17 Nov 2023 17:20)      INTERVAL HPI/OVERNIGHT EVENTS: no events noted overnight. patient was seen a and examined, [facility] utilized Ms. Alvares [id number], he c/o feeling thirsty Patient was given water and he started coughing on it.     MEDICATIONS  (STANDING):  albuterol/ipratropium for Nebulization 3 milliLiter(s) Nebulizer every 6 hours  allopurinol 300 milliGRAM(s) Oral daily  dextrose 5% + sodium chloride 0.9%. 1000 milliLiter(s) (75 mL/Hr) IV Continuous <Continuous>  donepezil 5 milliGRAM(s) Oral at bedtime  enoxaparin Injectable 40 milliGRAM(s) SubCutaneous every 24 hours  guaiFENesin Oral Liquid (Sugar-Free) 200 milliGRAM(s) Oral every 6 hours  lactated ringers. 1000 milliLiter(s) (70 mL/Hr) IV Continuous <Continuous>  losartan 50 milliGRAM(s) Oral daily  pantoprazole    Tablet 40 milliGRAM(s) Oral before breakfast  predniSONE   Tablet 60 milliGRAM(s) Oral daily  risperiDONE   Tablet 0.5 milliGRAM(s) Oral two times a day  senna 2 Tablet(s) Oral at bedtime  sodium chloride 3%  Inhalation 4 milliLiter(s) Inhalation every 6 hours  trimethoprim/polymyxin Solution 1 Drop(s) Both EYES every 6 hours  valACYclovir 1000 milliGRAM(s) Oral every 8 hours    MEDICATIONS  (PRN):  acetaminophen     Tablet .. 650 milliGRAM(s) Oral every 6 hours PRN Temp greater or equal to 38C (100.4F), Mild Pain (1 - 3)  sodium chloride 0.65% Nasal 1 Spray(s) Both Nostrils two times a day PRN Nasal Congestion      __________________________________________________  REVIEW OF SYSTEMS:    limited, given mental status- dementia        Vital Signs Last 24 Hrs  T(C): 36.7 (18 Nov 2023 05:18), Max: 36.8 (17 Nov 2023 15:36)  T(F): 98.1 (18 Nov 2023 05:18), Max: 98.2 (17 Nov 2023 15:36)  HR: 69 (18 Nov 2023 05:18) (67 - 83)  BP: 117/78 (18 Nov 2023 05:18) (117/78 - 154/78)  BP(mean): --  RR: 18 (18 Nov 2023 05:18) (18 - 18)  SpO2: 97% (18 Nov 2023 05:18) (93% - 100%)    Parameters below as of 18 Nov 2023 05:18  Patient On (Oxygen Delivery Method): room air        ________________________________________________  PHYSICAL EXAM:  GENERAL: NAD, elderly male   HEENT: Normocephalic;  conjunctivae and sclerae clear; moist mucous membranes;   NECK : supple  CHEST/LUNG: b/l rhonchi   HEART: S1 S2  regular; no murmurs, gallops or rubs  ABDOMEN: Soft, Nontender, Nondistended; Bowel sounds present  EXTREMITIES: no cyanosis; no edema; no calf tenderness  SKIN: warm and dry; no rash  NERVOUS SYSTEM:  Awake and alert x1 to name only     _________________________________________________  LABS:                        13.1   4.92  )-----------( 223      ( 17 Nov 2023 05:40 )             40.5     11-17    144  |  115<H>  |  22<H>  ----------------------------<  97  3.4<L>   |  24  |  0.90    Ca    9.0      17 Nov 2023 05:40  Phos  2.4     11-17  Mg     2.4     11-17    TPro  7.4  /  Alb  2.6<L>  /  TBili  0.5  /  DBili  x   /  AST  20  /  ALT  25  /  AlkPhos  64  11-17      Urinalysis Basic - ( 17 Nov 2023 05:40 )    Color: x / Appearance: x / SG: x / pH: x  Gluc: 97 mg/dL / Ketone: x  / Bili: x / Urobili: x   Blood: x / Protein: x / Nitrite: x   Leuk Esterase: x / RBC: x / WBC x   Sq Epi: x / Non Sq Epi: x / Bacteria: x      CAPILLARY BLOOD GLUCOSE            RADIOLOGY & ADDITIONAL TESTS:    Imaging Personally Reviewed:  YES    Consultant(s) Notes Reviewed:   YES    Care Discussed with Consultants : YES     Plan of care was discussed with patient and /or primary care giver; all questions and concerns were addressed and care was aligned with patient's wishes.

## 2023-11-19 NOTE — PROGRESS NOTE ADULT - SUBJECTIVE AND OBJECTIVE BOX
Patient is a 83y old  Male who presents with a chief complaint of Sepsis (18 Nov 2023 13:51)      INTERVAL HPI/OVERNIGHT EVENTS: no events noted overnight.    MEDICATIONS  (STANDING):  albuterol/ipratropium for Nebulization 3 milliLiter(s) Nebulizer every 6 hours  allopurinol 300 milliGRAM(s) Oral daily  dextrose 5% + sodium chloride 0.9%. 1000 milliLiter(s) (75 mL/Hr) IV Continuous <Continuous>  donepezil 5 milliGRAM(s) Oral at bedtime  enoxaparin Injectable 40 milliGRAM(s) SubCutaneous every 24 hours  guaiFENesin Oral Liquid (Sugar-Free) 200 milliGRAM(s) Oral every 6 hours  losartan 50 milliGRAM(s) Oral daily  pantoprazole    Tablet 40 milliGRAM(s) Oral before breakfast  predniSONE   Tablet 60 milliGRAM(s) Oral daily  risperiDONE   Tablet 0.5 milliGRAM(s) Oral two times a day  senna 2 Tablet(s) Oral at bedtime  sodium chloride 3%  Inhalation 4 milliLiter(s) Inhalation every 6 hours  trimethoprim/polymyxin Solution 1 Drop(s) Both EYES every 6 hours  valACYclovir 1000 milliGRAM(s) Oral every 8 hours    MEDICATIONS  (PRN):  acetaminophen     Tablet .. 650 milliGRAM(s) Oral every 6 hours PRN Temp greater or equal to 38C (100.4F), Mild Pain (1 - 3)  sodium chloride 0.65% Nasal 1 Spray(s) Both Nostrils two times a day PRN Nasal Congestion      __________________________________________________  REVIEW OF SYSTEMS:    CONSTITUTIONAL: No fever,   EYES: no acute visual disturbances  NECK: No pain or stiffness  RESPIRATORY: No cough; No shortness of breath  CARDIOVASCULAR: No chest pain, no palpitations  GASTROINTESTINAL: No pain. No nausea or vomiting; No diarrhea   NEUROLOGICAL: No headache or numbness, no tremors  MUSCULOSKELETAL: No joint pain, no muscle pain  GENITOURINARY: no dysuria, no frequency, no hesitancy  PSYCHIATRY: no depression , no anxiety  ALL OTHER  ROS negative        Vital Signs Last 24 Hrs  T(C): 36.7 (19 Nov 2023 05:31), Max: 36.8 (18 Nov 2023 20:55)  T(F): 98 (19 Nov 2023 05:31), Max: 98.2 (18 Nov 2023 20:55)  HR: 72 (19 Nov 2023 06:33) (66 - 77)  BP: 165/66 (19 Nov 2023 06:33) (141/71 - 168/85)  BP(mean): 111 (19 Nov 2023 05:31) (111 - 111)  RR: 17 (19 Nov 2023 06:33) (17 - 18)  SpO2: 94% (19 Nov 2023 06:33) (94% - 98%)    Parameters below as of 19 Nov 2023 06:33  Patient On (Oxygen Delivery Method): room air        ________________________________________________  PHYSICAL EXAM:  GENERAL: NAD  HEENT: Normocephalic;  conjunctivae and sclerae clear; moist mucous membranes;   NECK : supple  CHEST/LUNG: Clear to auscultation bilaterally with good air entry   HEART: S1 S2  regular; no murmurs, gallops or rubs  ABDOMEN: Soft, Nontender, Nondistended; Bowel sounds present  EXTREMITIES: no cyanosis; no edema; no calf tenderness  SKIN: warm and dry; no rash  NERVOUS SYSTEM:  Awake and alert; Oriented  to place, person and time ; no new deficits    _________________________________________________  LABS:              CAPILLARY BLOOD GLUCOSE            RADIOLOGY & ADDITIONAL TESTS:    Imaging Personally Reviewed:  YES    Consultant(s) Notes Reviewed:   YES    Care Discussed with Consultants : YES     Plan of care was discussed with patient and /or primary care giver; all questions and concerns were addressed and care was aligned with patient's wishes.

## 2023-11-20 NOTE — PROGRESS NOTE ADULT - SUBJECTIVE AND OBJECTIVE BOX
PULMONARY CONSULT SERVICE FOLLOW-UP NOTE    INTERVAL HPI:  Reviewed chart and overnight events; patient seen and examined at bedside.    MEDICATIONS:  Pulmonary:  albuterol/ipratropium for Nebulization 3 milliLiter(s) Nebulizer every 6 hours  guaiFENesin Oral Liquid (Sugar-Free) 200 milliGRAM(s) Oral every 6 hours  sodium chloride 3%  Inhalation 4 milliLiter(s) Inhalation every 6 hours    Antimicrobials:  valACYclovir 1000 milliGRAM(s) Oral every 8 hours    Anticoagulants:  enoxaparin Injectable 40 milliGRAM(s) SubCutaneous every 24 hours    Cardiac:  losartan 50 milliGRAM(s) Oral daily      Allergies    No Known Allergies    Intolerances        Vital Signs Last 24 Hrs  T(C): 36.6 (20 Nov 2023 05:40), Max: 36.6 (20 Nov 2023 05:40)  T(F): 97.9 (20 Nov 2023 05:40), Max: 97.9 (20 Nov 2023 05:40)  HR: 87 (20 Nov 2023 05:40) (73 - 88)  BP: 163/83 (20 Nov 2023 05:40) (150/79 - 163/83)  BP(mean): 98 (20 Nov 2023 05:40) (98 - 98)  RR: 17 (20 Nov 2023 05:40) (16 - 17)  SpO2: 93% (20 Nov 2023 05:40) (93% - 94%)    Parameters below as of 20 Nov 2023 05:40  Patient On (Oxygen Delivery Method): room air            PHYSICAL EXAM:  GEN: NAD, well-appearing, comfortable upright/ semirecumbent in bed  HEENT: anicteric sclera; MMM  RESP: no respiratory distress, CTA B/L; no W/R/R  CV: +S1/S2, RRR, no m/g/r  GI: soft, NT/ND, +BS  EXTREM: WWP; no edema, clubbing or cyanosis  Vascular: 2+ radial pulses B/L  NEURO: alert and awake, moving limbs spontaneously    LABS:                              RADIOLOGY & ADDITIONAL STUDIES:  < from: Xray Chest 1 View- PORTABLE-Routine (Xray Chest 1 View- PORTABLE-Routine .) (11.17.23 @ 13:25) >  HEART: normal in size.  LUNGS: Left lower lobe consolidation. Followup is recommended to   demonstrate resolution..  BONES: degenerative changes PULMONARY CONSULT SERVICE FOLLOW-UP NOTE    INTERVAL HPI:  Reviewed chart and overnight events; patient seen and examined at bedside. Appears delirious but comfortable, no respiratory distress. Denying pain or difficulty breathing.    MEDICATIONS:  Pulmonary:  albuterol/ipratropium for Nebulization 3 milliLiter(s) Nebulizer every 6 hours  guaiFENesin Oral Liquid (Sugar-Free) 200 milliGRAM(s) Oral every 6 hours  sodium chloride 3%  Inhalation 4 milliLiter(s) Inhalation every 6 hours    Antimicrobials:  valACYclovir 1000 milliGRAM(s) Oral every 8 hours    Anticoagulants:  enoxaparin Injectable 40 milliGRAM(s) SubCutaneous every 24 hours    Cardiac:  losartan 50 milliGRAM(s) Oral daily      Allergies    No Known Allergies    Intolerances        Vital Signs Last 24 Hrs  T(C): 36.6 (20 Nov 2023 05:40), Max: 36.6 (20 Nov 2023 05:40)  T(F): 97.9 (20 Nov 2023 05:40), Max: 97.9 (20 Nov 2023 05:40)  HR: 87 (20 Nov 2023 05:40) (73 - 88)  BP: 163/83 (20 Nov 2023 05:40) (150/79 - 163/83)  BP(mean): 98 (20 Nov 2023 05:40) (98 - 98)  RR: 17 (20 Nov 2023 05:40) (16 - 17)  SpO2: 93% (20 Nov 2023 05:40) (93% - 94%)    Parameters below as of 20 Nov 2023 05:40  Patient On (Oxygen Delivery Method): room air            PHYSICAL EXAM:  GEN: NAD, chronically frail-appearing, comfortable semirecumbent in bed  HEENT: conjunctival injection b/l w/ right > left eyelid fullness, right droop; oropharynx clear, MMM  RESP: no respiratory distress, coarse rhonchi throughout  CV: +S1/S2, RRR, no m/g/r  GI: soft, NT/ND, +BS  EXTREM: WWP; no edema, clubbing or cyanosis  Vascular: 2+ radial pulses B/L  NEURO: alert and awake, moving limbs spontaneously, R facial droop    LABS:                              RADIOLOGY & ADDITIONAL STUDIES:  < from: Xray Chest 1 View- PORTABLE-Routine (Xray Chest 1 View- PORTABLE-Routine .) (11.17.23 @ 13:25) >  HEART: normal in size.  LUNGS: Left lower lobe consolidation. Followup is recommended to   demonstrate resolution..  BONES: degenerative changes

## 2023-11-20 NOTE — PROGRESS NOTE ADULT - PROBLEM SELECTOR PLAN 1
pt has a right sided facial droop with right eyelid droop.  family mentions noticing only the right eyelid droop on 11/10/23  difficulty swallowing liquids and solids  speech and swallow consulted-->failed FEES assessment  NPO with crushed meds  CT/MR head: No acute infarct, hemorrhage, or mass identified   Neuro (Dr. Humphries) consulted  possible bells palsy  start prednisone 60mg qd and valcyclovir 1g q8  Palliative consulted re possible PEG--> DNR/DNI--> comfort measures as of 11/17  s/p modified barium swallow--> aspiration-->keep NPO-->see report

## 2023-11-20 NOTE — PROGRESS NOTE ADULT - ASSESSMENT
82yo man trivial distant smoking hx w/ PMH dementia presented with weakness, dyspnea and cough; found with +coronavirus (non-COVID) and also being worked up for CVA with presentation suggestive of viral bronchitis possibly complicated by aspiration/impaired airway clearance. Chest imaging without consolidation, and with increased mucus production possibly with impaired clearance, suggestive of bronchitis - unknown baseline and primary team also working up CVA.     #Viral bronchitis  #Coronavirus (non-COVID) infection  #Aspiration/mucus plugging      Recommendations:  - conservative mgmt for coronavirus infection  - airway clearance regimen as follows:  --> duoneb  q6h standing   --> sodium chloride 3% neb q6h with albuterol/duoneb   --> can d/c chest vest  - suctioning PRN  - maintain strict aspirations at all times, keep head of bed elevated ~45 deg  - FEES with severe oropharyngeal dysfunction, remains NPO   84yo man trivial distant smoking hx w/ PMH dementia presented with weakness, dyspnea and cough; found with +coronavirus (non-COVID) and also being worked up for CVA with presentation suggestive of viral bronchitis possibly complicated by aspiration/impaired airway clearance. Chest imaging without consolidation, and with increased mucus production possibly with impaired clearance, suggestive of bronchitis - unknown baseline and primary team also working up CVA.     #Viral bronchitis  #Coronavirus (non-COVID) infection  #Aspiration/mucus plugging      Recommendations:  - conservative mgmt for coronavirus infection  - airway clearance regimen as follows:  --> duoneb  q6h standing   --> sodium chloride 3% neb q6h with albuterol/duoneb   --> can d/c chest vest  - suctioning PRN  - maintain strict aspirations at all times, keep head of bed elevated ~45 deg  - FEES with severe oropharyngeal dysfunction  - palliative care team following  - Will continue to follow

## 2023-11-20 NOTE — PROGRESS NOTE ADULT - ASSESSMENT
83 y.o. M with PMHx of HTN, dementia, presenting with generalized weakness and shortness of breath. Admitted for sepsis 2/2 AHRF, hospital course c/b difficulty swallowing, failed FEES. Palliative care consulted regarding GOC, family decided to not pursue PEG tube, DNR/DNI, pending LTC placement with hospice.

## 2023-11-20 NOTE — PROGRESS NOTE ADULT - SUBJECTIVE AND OBJECTIVE BOX
NP Note discussed with  Primary Attending    Patient is a 83y old  Male who presents with a chief complaint of Sepsis (20 Nov 2023 09:30)      INTERVAL HPI/OVERNIGHT EVENTS: no new complaints    MEDICATIONS  (STANDING):  albuterol/ipratropium for Nebulization 3 milliLiter(s) Nebulizer every 6 hours  allopurinol 300 milliGRAM(s) Oral daily  dextrose 5% + sodium chloride 0.9%. 1000 milliLiter(s) (75 mL/Hr) IV Continuous <Continuous>  donepezil 5 milliGRAM(s) Oral at bedtime  enoxaparin Injectable 40 milliGRAM(s) SubCutaneous every 24 hours  guaiFENesin Oral Liquid (Sugar-Free) 200 milliGRAM(s) Oral every 6 hours  losartan 50 milliGRAM(s) Oral daily  pantoprazole    Tablet 40 milliGRAM(s) Oral before breakfast  risperiDONE   Tablet 0.5 milliGRAM(s) Oral two times a day  senna 2 Tablet(s) Oral at bedtime  sodium chloride 3%  Inhalation 4 milliLiter(s) Inhalation every 6 hours  trimethoprim/polymyxin Solution 1 Drop(s) Both EYES every 6 hours  valACYclovir 1000 milliGRAM(s) Oral every 8 hours    MEDICATIONS  (PRN):  acetaminophen     Tablet .. 650 milliGRAM(s) Oral every 6 hours PRN Temp greater or equal to 38C (100.4F), Mild Pain (1 - 3)  sodium chloride 0.65% Nasal 1 Spray(s) Both Nostrils two times a day PRN Nasal Congestion      __________________________________________________  REVIEW OF SYSTEMS:    CONSTITUTIONAL: No fever,   EYES: no acute visual disturbances  NECK: No pain or stiffness  RESPIRATORY: No cough; No shortness of breath  CARDIOVASCULAR: No chest pain, no palpitations  GASTROINTESTINAL: No pain. No nausea or vomiting; No diarrhea   NEUROLOGICAL: No headache or numbness, no tremors  MUSCULOSKELETAL: No joint pain, no muscle pain  GENITOURINARY: no dysuria, no frequency, no hesitancy  PSYCHIATRY: no depression , no anxiety  ALL OTHER  ROS negative        Vital Signs Last 24 Hrs  T(C): 37.1 (20 Nov 2023 12:43), Max: 37.1 (20 Nov 2023 12:43)  T(F): 98.8 (20 Nov 2023 12:43), Max: 98.8 (20 Nov 2023 12:43)  HR: 82 (20 Nov 2023 12:43) (82 - 88)  BP: 124/57 (20 Nov 2023 12:43) (124/57 - 163/83)  BP(mean): 98 (20 Nov 2023 05:40) (98 - 98)  RR: 18 (20 Nov 2023 12:43) (16 - 18)  SpO2: 93% (20 Nov 2023 12:43) (93% - 93%)    Parameters below as of 20 Nov 2023 12:43  Patient On (Oxygen Delivery Method): room air        ________________________________________________  PHYSICAL EXAM:  GENERAL: NAD  HEENT: Normocephalic;  conjunctivae and sclerae clear; moist mucous membranes;   NECK : supple  CHEST/LUNG: Clear to auscultation bilaterally with good air entry   HEART: S1 S2  regular; no murmurs, gallops or rubs  ABDOMEN: Soft, Nontender, Nondistended; Bowel sounds present  EXTREMITIES: no cyanosis; no edema; no calf tenderness  SKIN: warm and dry; no rash  NERVOUS SYSTEM:  Awake and alert; Oriented  to place, person and time ; no new deficits    _________________________________________________  LABS:              CAPILLARY BLOOD GLUCOSE            RADIOLOGY & ADDITIONAL TESTS:    Imaging Personally Reviewed:  YES/NO    Consultant(s) Notes Reviewed:   YES/ No    Care Discussed with Consultants :     Plan of care was discussed with patient and /or primary care giver; all questions and concerns were addressed and care was aligned with patient's wishes.

## 2023-11-20 NOTE — PROGRESS NOTE ADULT - SUBJECTIVE AND OBJECTIVE BOX
NP Note discussed with  Primary Attending    Patient is a 83y old  Male who presents with a chief complaint of Sepsis (20 Nov 2023 13:57)      INTERVAL HPI/OVERNIGHT EVENTS: no acute events overnight    MEDICATIONS  (STANDING):  albuterol/ipratropium for Nebulization 3 milliLiter(s) Nebulizer every 6 hours  allopurinol 300 milliGRAM(s) Oral daily  dextrose 5% + sodium chloride 0.9%. 1000 milliLiter(s) (75 mL/Hr) IV Continuous <Continuous>  donepezil 5 milliGRAM(s) Oral at bedtime  enoxaparin Injectable 40 milliGRAM(s) SubCutaneous every 24 hours  guaiFENesin Oral Liquid (Sugar-Free) 200 milliGRAM(s) Oral every 6 hours  losartan 50 milliGRAM(s) Oral daily  pantoprazole    Tablet 40 milliGRAM(s) Oral before breakfast  risperiDONE   Tablet 0.5 milliGRAM(s) Oral two times a day  senna 2 Tablet(s) Oral at bedtime  sodium chloride 3%  Inhalation 4 milliLiter(s) Inhalation every 6 hours  trimethoprim/polymyxin Solution 1 Drop(s) Both EYES every 6 hours  valACYclovir 1000 milliGRAM(s) Oral every 8 hours    MEDICATIONS  (PRN):  acetaminophen     Tablet .. 650 milliGRAM(s) Oral every 6 hours PRN Temp greater or equal to 38C (100.4F), Mild Pain (1 - 3)  sodium chloride 0.65% Nasal 1 Spray(s) Both Nostrils two times a day PRN Nasal Congestion      __________________________________________________  REVIEW OF SYSTEMS:    STEPH due to mental status    Vital Signs Last 24 Hrs  T(C): 37.1 (20 Nov 2023 12:43), Max: 37.1 (20 Nov 2023 12:43)  T(F): 98.8 (20 Nov 2023 12:43), Max: 98.8 (20 Nov 2023 12:43)  HR: 82 (20 Nov 2023 12:43) (82 - 88)  BP: 124/57 (20 Nov 2023 12:43) (124/57 - 163/83)  BP(mean): 98 (20 Nov 2023 05:40) (98 - 98)  RR: 19 (20 Nov 2023 16:16) (16 - 19)  SpO2: 94% (20 Nov 2023 16:16) (90% - 94%)    Parameters below as of 20 Nov 2023 16:16  Patient On (Oxygen Delivery Method): nasal cannula  O2 Flow (L/min): 4      ________________________________________________  PHYSICAL EXAM:  GENERAL: NAD  HEENT: Normocephalic  NECK : supple  CHEST/LUNG: Clear to auscultation bilaterally   HEART: S1 S2  regular  ABDOMEN: Soft, Nontender, Nondistended; Bowel sounds present  EXTREMITIES: no edema  SKIN: warm and dry; no rash  NERVOUS SYSTEM:  lethargic; Oriented to Person    _________________________________________________  LABS:              CAPILLARY BLOOD GLUCOSE            RADIOLOGY & ADDITIONAL TESTS:    < from: Xray Chest 1 View- PORTABLE-Routine (Xray Chest 1 View- PORTABLE-Routine .) (11.17.23 @ 13:25) >  INTERPRETATION:  HISTORY: Admitting Dxs: A41.9 SEPSIS, UNSPECIFIED   ORGANISM; ; abnormal lung exam;  TECHNIQUE: Portable frontal view of the chest, 1 view.  COMPARISON: 11/11/2023.  FINDINGS/  IMPRESSION:    HEART: normal in size.  LUNGS: Left lower lobe consolidation. Followup is recommended to   demonstrate resolution..  BONES: degenerative changes    < end of copied text >      < from: MR Head w/wo IV Cont (11.13.23 @ 17:22) >  FINDINGS:  Motion degraded examination. Within these limits:    Please note that sequences labeled "post contrast" demonstrate no   presence of contrast material. Within these limits:    No acute infarct or intracranial hemorrhage.  Scattered T2/FLAIR hyperintense white matter foci, most compatible with   chronic small vessel changes.    No hydrocephalus. No extra-axial fluid collections. The skull base flow   voids are present.    The visualized intraorbital contents are normal. Uterus retention cyst   versus polyp in the right sphenoid sinus. The mastoid air cells are   clear. The visualized osseous structures, soft tissues and partially   visualized parotid glands appear normal.    IMPRESSION:    Please note that sequences labeled "post contrast" demonstrate no   contrast material. No documentation from technologist was provided as to   reason for failed injection. Postcontrast imaging can be reattempted if   clinically warranted. This was discussed with Dr. Atkins at 9:05 AM   11/14/2023.    No acute infarct, hemorrhage, or mass identified.    < end of copied text >    Imaging Personally Reviewed:  YES    Consultant(s) Notes Reviewed:   YES        Plan of care was discussed with patient and /or primary care giver; all questions and concerns were addressed and care was aligned with patient's wishes.     Azithromycin Counseling:  I discussed with the patient the risks of azithromycin including but not limited to GI upset, allergic reaction, drug rash, diarrhea, and yeast infections. Dapsone Pregnancy And Lactation Text: This medication is Pregnancy Category C and is not considered safe during pregnancy or breast feeding. High Dose Vitamin A Counseling: Side effects reviewed, pt to contact office should one occur. Birth Control Pills Pregnancy And Lactation Text: This medication should be avoided if pregnant and for the first 30 days post-partum. Isotretinoin Counseling: Patient should get monthly blood tests, not donate blood, not drive at night if vision affected, not share medication, and not undergo elective surgery for 6 months after tx completed. Side effects reviewed, pt to contact office should one occur. Sarecycline Pregnancy And Lactation Text: This medication is Pregnancy Category D and not consider safe during pregnancy. It is also excreted in breast milk. Azelaic Acid Counseling: Patient counseled that medicine may cause skin irritation and to avoid applying near the eyes.  In the event of skin irritation, the patient was advised to reduce the amount of the drug applied or use it less frequently.   The patient verbalized understanding of the proper use and possible adverse effects of azelaic acid.  All of the patient's questions and concerns were addressed. Spironolactone Pregnancy And Lactation Text: This medication can cause feminization of the male fetus and should be avoided during pregnancy. The active metabolite is also found in breast milk. Benzoyl Peroxide Counseling: Patient counseled that medicine may cause skin irritation and bleach clothing.  In the event of skin irritation, the patient was advised to reduce the amount of the drug applied or use it less frequently.   The patient verbalized understanding of the proper use and possible adverse effects of benzoyl peroxide.  All of the patient's questions and concerns were addressed. Topical Clindamycin Pregnancy And Lactation Text: This medication is Pregnancy Category B and is considered safe during pregnancy. It is unknown if it is excreted in breast milk. Tazorac Pregnancy And Lactation Text: This medication is not safe during pregnancy. It is unknown if this medication is excreted in breast milk. Aklief counseling:  Patient advised to apply a pea-sized amount only at bedtime and wait 30 minutes after washing their face before applying.  If too drying, patient may add a non-comedogenic moisturizer.  The most commonly reported side effects including irritation, redness, scaling, dryness, stinging, burning, itching, and increased risk of sunburn.  The patient verbalized understanding of the proper use and possible adverse effects of retinoids.  All of the patient's questions and concerns were addressed. Bactrim Pregnancy And Lactation Text: This medication is Pregnancy Category D and is known to cause fetal risk.  It is also excreted in breast milk. Erythromycin Counseling:  I discussed with the patient the risks of erythromycin including but not limited to GI upset, allergic reaction, drug rash, diarrhea, increase in liver enzymes, and yeast infections. Topical Sulfur Applications Counseling: Topical Sulfur Counseling: Patient counseled that this medication may cause skin irritation or allergic reactions.  In the event of skin irritation, the patient was advised to reduce the amount of the drug applied or use it less frequently.   The patient verbalized understanding of the proper use and possible adverse effects of topical sulfur application.  All of the patient's questions and concerns were addressed. Tetracycline Counseling: Patient counseled regarding possible photosensitivity and increased risk for sunburn.  Patient instructed to avoid sunlight, if possible.  When exposed to sunlight, patients should wear protective clothing, sunglasses, and sunscreen.  The patient was instructed to call the office immediately if the following severe adverse effects occur:  hearing changes, easy bruising/bleeding, severe headache, or vision changes.  The patient verbalized understanding of the proper use and possible adverse effects of tetracycline.  All of the patient's questions and concerns were addressed. Patient understands to avoid pregnancy while on therapy due to potential birth defects. Benzoyl Peroxide Pregnancy And Lactation Text: This medication is Pregnancy Category C. It is unknown if benzoyl peroxide is excreted in breast milk. Topical Retinoid Pregnancy And Lactation Text: This medication is Pregnancy Category C. It is unknown if this medication is excreted in breast milk. Winlevi Counseling:  I discussed with the patient the risks of topical clascoterone including but not limited to erythema, scaling, itching, and stinging. Patient voiced their understanding. Use Enhanced Medication Counseling?: No Isotretinoin Pregnancy And Lactation Text: This medication is Pregnancy Category X and is considered extremely dangerous during pregnancy. It is unknown if it is excreted in breast milk. Spironolactone Counseling: Patient advised regarding risks of diarrhea, abdominal pain, hyperkalemia, birth defects (for female patients), liver toxicity and renal toxicity. The patient may need blood work to monitor liver and kidney function and potassium levels while on therapy. The patient verbalized understanding of the proper use and possible adverse effects of spironolactone.  All of the patient's questions and concerns were addressed. Azelaic Acid Pregnancy And Lactation Text: This medication is considered safe during pregnancy and breast feeding. Winlevi Pregnancy And Lactation Text: This medication is considered safe during pregnancy and breastfeeding. Dapsone Counseling: I discussed with the patient the risks of dapsone including but not limited to hemolytic anemia, agranulocytosis, rashes, methemoglobinemia, kidney failure, peripheral neuropathy, headaches, GI upset, and liver toxicity.  Patients who start dapsone require monitoring including baseline LFTs and weekly CBCs for the first month, then every month thereafter.  The patient verbalized understanding of the proper use and possible adverse effects of dapsone.  All of the patient's questions and concerns were addressed. High Dose Vitamin A Pregnancy And Lactation Text: High dose vitamin A therapy is contraindicated during pregnancy and breast feeding. Azithromycin Pregnancy And Lactation Text: This medication is considered safe during pregnancy and is also secreted in breast milk. Doxycycline Counseling:  Patient counseled regarding possible photosensitivity and increased risk for sunburn.  Patient instructed to avoid sunlight, if possible.  When exposed to sunlight, patients should wear protective clothing, sunglasses, and sunscreen.  The patient was instructed to call the office immediately if the following severe adverse effects occur:  hearing changes, easy bruising/bleeding, severe headache, or vision changes.  The patient verbalized understanding of the proper use and possible adverse effects of doxycycline.  All of the patient's questions and concerns were addressed. Topical Clindamycin Counseling: Patient counseled that this medication may cause skin irritation or allergic reactions.  In the event of skin irritation, the patient was advised to reduce the amount of the drug applied or use it less frequently.   The patient verbalized understanding of the proper use and possible adverse effects of clindamycin.  All of the patient's questions and concerns were addressed. Bactrim Counseling:  I discussed with the patient the risks of sulfa antibiotics including but not limited to GI upset, allergic reaction, drug rash, diarrhea, dizziness, photosensitivity, and yeast infections.  Rarely, more serious reactions can occur including but not limited to aplastic anemia, agranulocytosis, methemoglobinemia, blood dyscrasias, liver or kidney failure, lung infiltrates or desquamative/blistering drug rashes. Doxycycline Pregnancy And Lactation Text: This medication is Pregnancy Category D and not consider safe during pregnancy. It is also excreted in breast milk but is considered safe for shorter treatment courses. Minocycline Counseling: Patient advised regarding possible photosensitivity and discoloration of the teeth, skin, lips, tongue and gums.  Patient instructed to avoid sunlight, if possible.  When exposed to sunlight, patients should wear protective clothing, sunglasses, and sunscreen.  The patient was instructed to call the office immediately if the following severe adverse effects occur:  hearing changes, easy bruising/bleeding, severe headache, or vision changes.  The patient verbalized understanding of the proper use and possible adverse effects of minocycline.  All of the patient's questions and concerns were addressed. Tazorac Counseling:  Patient advised that medication is irritating and drying.  Patient may need to apply sparingly and wash off after an hour before eventually leaving it on overnight.  The patient verbalized understanding of the proper use and possible adverse effects of tazorac.  All of the patient's questions and concerns were addressed. Erythromycin Pregnancy And Lactation Text: This medication is Pregnancy Category B and is considered safe during pregnancy. It is also excreted in breast milk. Birth Control Pills Counseling: Birth Control Pill Counseling: I discussed with the patient the potential side effects of OCPs including but not limited to increased risk of stroke, heart attack, thrombophlebitis, deep venous thrombosis, hepatic adenomas, breast changes, GI upset, headaches, and depression.  The patient verbalized understanding of the proper use and possible adverse effects of OCPs. All of the patient's questions and concerns were addressed. Sarecycline Counseling: Patient advised regarding possible photosensitivity and discoloration of the teeth, skin, lips, tongue and gums.  Patient instructed to avoid sunlight, if possible.  When exposed to sunlight, patients should wear protective clothing, sunglasses, and sunscreen.  The patient was instructed to call the office immediately if the following severe adverse effects occur:  hearing changes, easy bruising/bleeding, severe headache, or vision changes.  The patient verbalized understanding of the proper use and possible adverse effects of sarecycline.  All of the patient's questions and concerns were addressed. Aklief Pregnancy And Lactation Text: It is unknown if this medication is safe to use during pregnancy.  It is unknown if this medication is excreted in breast milk.  Breastfeeding women should use the topical cream on the smallest area of the skin for the shortest time needed while breastfeeding.  Do not apply to nipple and areola. Topical Retinoid counseling:  Patient advised to apply a pea-sized amount only at bedtime and wait 30 minutes after washing their face before applying.  If too drying, patient may add a non-comedogenic moisturizer. The patient verbalized understanding of the proper use and possible adverse effects of retinoids.  All of the patient's questions and concerns were addressed. Detail Level: Simple Topical Sulfur Applications Pregnancy And Lactation Text: This medication is Pregnancy Category C and has an unknown safety profile during pregnancy. It is unknown if this topical medication is excreted in breast milk.

## 2023-11-21 NOTE — PROGRESS NOTE ADULT - ASSESSMENT
83 y.o. M with PMHx of HTN and advanced dementia, with wife (primary caretaker) dying approx 1 week ago, who presented to [facility] ER late evening 11/10 with generalized weakness and shortness of breath. In ER found to have + RVP for non-COVID coronavirus.  Patient admitted for sepsis 2/2 AHRF with acute viral URI, and ambulatory dysfunction.  Also noted to have bilateral conjunctivitis.  Received IV Rocephin x 1 dose.  On day after admission (11/12), patient was noted to have a right sided facial droop but no other definitive neuro symptoms, ? possible CVA vs Bell's palsy.  Neurology consulted, CT and MRI of brain both negative for infarct, bleed, or acute intracranial disease (MRI limited by motion artifact), started on oral prednisone and antiviral tx with acyclovir (now being changed to Valtrex).  Hospital course further complicated by severe pulmonary congestion with copious secretions requiring frequent suctioning, and ? new finding of severe oropharyngeal dysphagia, currently remains NPO.    Per GOC discussion on 11/17, family has elected for DNR/DNI/comfort measures, in agreement with LTC with hospice.  Patient now with change in status, appears to be in acute respiratory failure, actively dying.

## 2023-11-21 NOTE — PROGRESS NOTE ADULT - PROBLEM SELECTOR PLAN 1
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  At baseline, patient is essentially bedbound, minimally verbal, and total care in ADLs.  He is FAST stage 7B to 7C with a PPS score of 20%, now with worsening dysphagia.  Patient is hospice appropriate with likely prognosis of months.    11/21--patient with likely aspiration event overnight, now appears to be in terminal respiratory failure, now inpatient hospice/IPU appropriate with likely prognosis of days.  Per discussion with hospitalist Dr. Sommers, family remains in agreement with no further aggressive tx, no antibiotics, and continued comfort measures only.    Continue supportive care  Risperidone and Aricept discontinued

## 2023-11-21 NOTE — PROGRESS NOTE ADULT - PROBLEM SELECTOR PLAN 2
p/w fever, HR >90, hypotension, lactate 1.9  patient is afebrile and normotensive does not meet SIRS criteria.  RVP + coronavirus (not COVID 19)  Had +upper airway sounds, improved after nasal suction  on 3L NC saturating at 99% weaned off oxygen today.   home med: trelegy 200/ 52.5/25 and albuterol 90mcg  c/w duonebs q6 and hypertonic saline 3% inhalation w/ duonebs  oropharyngeal suctioning TID

## 2023-11-21 NOTE — PROGRESS NOTE ADULT - PROBLEM SELECTOR PLAN 3
RVP + coronavirus (not COVID 19)  Chest Xray; no consolidation  received 2.4L ns bolus  blood cultures and urine cultures negative  ua negative  tylenol prn  Pulm rec:   - maintain strict aspirations at all times, keep head of bed elevated ~45 deg  - agree w/ SLP eval and NPO for now  comfort measures

## 2023-11-21 NOTE — PROGRESS NOTE ADULT - SUBJECTIVE AND OBJECTIVE BOX
PULMONARY CONSULT SERVICE FOLLOW-UP NOTE    INTERVAL HPI:  Reviewed chart and overnight events; patient seen and examined at bedside. Overnight with fever Tmax 101.4F.    MEDICATIONS:  Pulmonary:  albuterol/ipratropium for Nebulization 3 milliLiter(s) Nebulizer every 6 hours  guaiFENesin Oral Liquid (Sugar-Free) 200 milliGRAM(s) Oral every 6 hours  sodium chloride 3%  Inhalation 4 milliLiter(s) Inhalation every 6 hours    Antimicrobials:  valACYclovir 1000 milliGRAM(s) Oral every 8 hours    Anticoagulants:  enoxaparin Injectable 40 milliGRAM(s) SubCutaneous every 24 hours    Cardiac:  losartan 50 milliGRAM(s) Oral daily      Allergies    No Known Allergies    Intolerances        Vital Signs Last 24 Hrs  T(C): 37.1 (21 Nov 2023 04:56), Max: 38.6 (20 Nov 2023 22:30)  T(F): 98.7 (21 Nov 2023 04:56), Max: 101.4 (20 Nov 2023 22:30)  HR: 78 (21 Nov 2023 04:56) (78 - 99)  BP: 150/64 (21 Nov 2023 04:56) (124/57 - 162/67)  BP(mean): --  RR: 18 (21 Nov 2023 04:56) (18 - 19)  SpO2: 94% (21 Nov 2023 04:56) (90% - 99%)    Parameters below as of 21 Nov 2023 04:56  Patient On (Oxygen Delivery Method): nasal cannula  O2 Flow (L/min): 4          PHYSICAL EXAM:  GEN: NAD, well-appearing, comfortable upright/ semirecumbent in bed  HEENT: anicteric sclera; MMM  RESP: no respiratory distress, CTA B/L; no W/R/R  CV: +S1/S2, RRR, no m/g/r  GI: soft, NT/ND, +BS  EXTREM: WWP; no edema, clubbing or cyanosis  Vascular: 2+ radial pulses B/L  NEURO: alert and awake, moving limbs spontaneously    LABS:                              RADIOLOGY & ADDITIONAL STUDIES:  No interval chest imaging for review  PULMONARY CONSULT SERVICE FOLLOW-UP NOTE    INTERVAL HPI:  Reviewed chart and overnight events; patient seen and examined at bedside. Overnight with fever Tmax 101.4F, possible aspiration event. Pt without acute complaints this AM, appears comfortable and delirious. Shakes head no when asked if he has any difficulty breathing, pain, requires suctioning for removal of oral secretions.     MEDICATIONS:  Pulmonary:  albuterol/ipratropium for Nebulization 3 milliLiter(s) Nebulizer every 6 hours  guaiFENesin Oral Liquid (Sugar-Free) 200 milliGRAM(s) Oral every 6 hours  sodium chloride 3%  Inhalation 4 milliLiter(s) Inhalation every 6 hours    Antimicrobials:  valACYclovir 1000 milliGRAM(s) Oral every 8 hours    Anticoagulants:  enoxaparin Injectable 40 milliGRAM(s) SubCutaneous every 24 hours    Cardiac:  losartan 50 milliGRAM(s) Oral daily      Allergies    No Known Allergies    Intolerances        Vital Signs Last 24 Hrs  T(C): 37.1 (21 Nov 2023 04:56), Max: 38.6 (20 Nov 2023 22:30)  T(F): 98.7 (21 Nov 2023 04:56), Max: 101.4 (20 Nov 2023 22:30)  HR: 78 (21 Nov 2023 04:56) (78 - 99)  BP: 150/64 (21 Nov 2023 04:56) (124/57 - 162/67)  BP(mean): --  RR: 18 (21 Nov 2023 04:56) (18 - 19)  SpO2: 94% (21 Nov 2023 04:56) (90% - 99%)    Parameters below as of 21 Nov 2023 04:56  Patient On (Oxygen Delivery Method): nasal cannula  O2 Flow (L/min): 4          PHYSICAL EXAM:  GEN: NAD, chronically frail-appearing, comfortable semirecumbent in bed  HEENT: conjunctival injection b/l w/ right > left eyelid fullness, right droop; oropharynx clear, MMM  RESP: no respiratory distress, coarse rhonchi throughout  CV: +S1/S2, RRR, no m/g/r  GI: soft, NT/ND, +BS  EXTREM: WWP; no edema, clubbing or cyanosis  Vascular: 2+ radial pulses B/L  NEURO: alert and awake, moving limbs spontaneously, R facial droop    LABS:  none                            RADIOLOGY & ADDITIONAL STUDIES:  No interval chest imaging for review

## 2023-11-21 NOTE — PHARMACOTHERAPY INTERVENTION NOTE - NSPHARMCOMMASP
ASP - Therapy recommended/ Alternative therapy
ASP - Dose optimization/Non-Renal dose adjustment
ASP - Duration of therapy

## 2023-11-21 NOTE — PROGRESS NOTE ADULT - PROBLEM SELECTOR PLAN 1
pt has a right sided facial droop with right eyelid droop.  family mentions noticing only the right eyelid droop on 11/10/23  difficulty swallowing liquids and solids  speech and swallow consulted-->failed FEES assessment  NPO with crushed meds  CT/MR head: No acute infarct, hemorrhage, or mass identified   Neuro (Dr. Humphries) consulted  possible bells palsy  complete treatment  Palliative consulted re possible PEG--> DNR/DNI--> comfort measures as of 11/17  s/p modified barium swallow--> aspiration-->keep NPO-->see report

## 2023-11-21 NOTE — PROGRESS NOTE ADULT - SUBJECTIVE AND OBJECTIVE BOX
follow up on:  complex medical decision making related to goals of care    [facility] Geriatric and Palliative Consult Service:  Damaris Brown DO: cell [phone])  Larry Kenny MD: cell ([phone])  Nicholas Alvarado NP: cell [phone])   Bryan Tirado LMSW: cell ([phone])   Agatha Guerin NP: via [employer] Teams    Can contact any Palliative Team member via [employer] Teams for consults and questions      OVERNIGHT EVENTS:    Present Symptoms: Mild, Moderate, Severe  Pain:             Location -                               Aggravating factors -             Quality -             Radiation -             Timing-             Severity (0-10 scale):             Minimal acceptable level (0-10 scale):  Fatigue:  Nausea:  Lack of Appetite:   SOB:  Depression:  Anxiety:  Review of Systems: [All others negative or Unable to obtain due to poor mentation]    CPOT:    https://ccs-st.org/resources/Documents/Sedation%20Analgesia%20Delirium%20in%20CC/CCS%20STH%20Guideline%20template%20CPOT.pdf  PAIN AD Score:   http://geriatrictoolkit.[email or web address]/painad.pdf (press ctrl +  left click to view)MEDICATIONS  (STANDING):  chlorhexidine 2% Cloths 1 Application(s) Topical <User Schedule>  dextrose 5% + sodium chloride 0.9%. 1000 milliLiter(s) (75 mL/Hr) IV Continuous <Continuous>  enoxaparin Injectable 40 milliGRAM(s) SubCutaneous every 24 hours  mupirocin 2% Ointment 1 Application(s) Both Nostrils two times a day  sodium chloride 3%  Inhalation 4 milliLiter(s) Inhalation every 6 hours  trimethoprim/polymyxin Solution 1 Drop(s) Both EYES every 6 hours    MEDICATIONS  (PRN):  acetaminophen  Suppository .. 650 milliGRAM(s) Rectal every 4 hours PRN Temp greater or equal to 38C (100.4F), Mild Pain (1 - 3), Moderate Pain (4 - 6)  glycopyrrolate Injectable 0.4 milliGRAM(s) IV Push every 6 hours PRN respiratory secretions  morphine  - Injectable 2 milliGRAM(s) IV Push every 2 hours PRN Tachypnea (RR > 22) or labored breathing      PHYSICAL EXAM:  Vital Signs Last 24 Hrs  T(C): 38.9 (21 Nov 2023 22:51), Max: 40.1 (21 Nov 2023 20:05)  T(F): 102 (21 Nov 2023 22:51), Max: 104.1 (21 Nov 2023 20:05)  HR: 123 (21 Nov 2023 20:05) (78 - 123)  BP: 88/46 (21 Nov 2023 20:05) (88/46 - 150/64)  BP(mean): --  RR: 21 (21 Nov 2023 20:05) (17 - 21)  SpO2: 85% (21 Nov 2023 20:05) (82% - 94%)    Parameters below as of 21 Nov 2023 20:05  Patient On (Oxygen Delivery Method): mask, nonrebreather  O2 Flow (L/min): 15      General: alert  oriented x ____    lethargic distressed cachexia  verbal nonverbal  unarousable     Palliative Performance Scale/Karnofsky Score:  http://npcrc.org/files/news/palliative_performance_scale_ppsv2.pdf    HEENT: no abnormal lesion, dry mouth  ET tube/trach oral lesions:  Lungs: tachypnea/labored breathing, audible excessive secretions  CV: RRR, S1S2, tachycardia  GI: soft non distended non tender  incontinent               PEG/NG/OG tube  constipation  last BM:   : incontinent  oliguria/anuria  manzanares  Musculoskeletal: weakness x4 edema x4    ambulatory with assistance   mostly/fully bedbound/wheelchair bound  Skin: no abnormal skin lesions, poor skin turgor, pressure ulcers stage:   Neuro: no deficits, mild cognitive impairment dsyphagia/dysarthria paresis  Oral intake ability: unable/only mouth care, minimal moderate full capability    LABS:                RADIOLOGY & ADDITIONAL STUDIES: follow up on:  complex medical decision making related to goals of care    [facility] Geriatric and Palliative Consult Service:  Damaris Brown DO: cell ([phone])  Larry Kenny MD: cell ([phone])  Nicholas Alvarado NP: cell ([phone])   Bryan Tirado LMSW: cell ([phone])   Agatha Guerin NP: via [employer] Teams    Can contact any Palliative Team member via [employer] Teams for consults and questions      OVERNIGHT EVENTS:  Acute change in status overnight--spiked fever to 101.4, with likely aspiration event.  Now with worsening hypoxia, recently placed back on 15 liters NRB.    Patient examined at bedside this afternoon.  He is minimally responsive to tactile and verbal stimuli, much more lethargic, barely opens eyes.  Attempted to obtain history with use of Cantonese speaking  (ID# [id number], Faith), but patient unable to respond to questions.  Gross tachypnea noted, no apparent signs of pain.  ++ audibly congested.  No other acute issues reported by bedside nursing staff.    Present Symptoms: Mild, Moderate, Severe  Pain:  Unable to verbalize, CPOT  Fatigue:  Nausea:  Lack of Appetite:   SOB:  Depression:  Anxiety:  Review of Systems:  Unable to obtain due to poor mentation/dementia/medical acuity    CPOT:  0  https://ccs-st.org/resources/Documents/Sedation%20Analgesia%20Delirium%20in%20CC/CCS%20STH%20Guideline%20template%20CPOT.pdf  PAIN AD Score:  1  http://geriatrictoolkit.[place]/cog/[email or web address].pdf (press ctrl +  left click to view)      MEDICATIONS  (STANDING):  chlorhexidine 2% Cloths 1 Application(s) Topical <User Schedule>  dextrose 5% + sodium chloride 0.9%. 1000 milliLiter(s) (75 mL/Hr) IV Continuous <Continuous>  enoxaparin Injectable 40 milliGRAM(s) SubCutaneous every 24 hours  mupirocin 2% Ointment 1 Application(s) Both Nostrils two times a day  sodium chloride 3%  Inhalation 4 milliLiter(s) Inhalation every 6 hours  trimethoprim/polymyxin Solution 1 Drop(s) Both EYES every 6 hours    MEDICATIONS  (PRN):  acetaminophen  Suppository .. 650 milliGRAM(s) Rectal every 4 hours PRN Temp greater or equal to 38C (100.4F), Mild Pain (1 - 3), Moderate Pain (4 - 6)  glycopyrrolate Injectable 0.4 milliGRAM(s) IV Push every 6 hours PRN respiratory secretions  morphine  - Injectable 2 milliGRAM(s) IV Push every 2 hours PRN Tachypnea (RR > 22) or labored breathing      PHYSICAL EXAM:  Vital Signs Last 24 Hrs  T(C): 38.9 (21 Nov 2023 22:51), Max: 40.1 (21 Nov 2023 20:05)  T(F): 102 (21 Nov 2023 22:51), Max: 104.1 (21 Nov 2023 20:05)  HR: 123 (21 Nov 2023 20:05) (78 - 123)  BP: 88/46 (21 Nov 2023 20:05) (88/46 - 150/64)  BP(mean): --  RR: 21 (21 Nov 2023 20:05) (17 - 21)  SpO2: 85% (21 Nov 2023 20:05) (82% - 94%)    Parameters below as of 21 Nov 2023 20:05  Patient On (Oxygen Delivery Method): mask, nonrebreather  O2 Flow (L/min): 15      General: alert  oriented x __0__    lethargic minimally responsive, + temporal wasting, in mild respiratory distress    Palliative Performance Scale/Karnofsky Score: 10%  http://npcrc.org/files/news/palliative_performance_scale_ppsv2.pdf    HEENT:  Anicteric, + NRB in place  Lungs:  tachypneic with labored respirations noted, ++ moderate congestion/rhonchi throughout  CV:   tachycardic, normal S1 and S2, no murmur  GI: soft non distended non tender   + normal bowel sounds  : incontinent    Musculoskeletal: weakness x4  in all extremities, essentially bedbound, no edema noted  Skin: no abnormal skin lesions or DU noted  Neuro:  minimal R facial droop appreciated, + severe cognitive impairment, + dysphagia  Oral intake ability: unable/mouth care only, remains NPO      LABS:      Albumin: 2.6 g/dL (11.17.23 @ 05:40)          RADIOLOGY & ADDITIONAL STUDIES:

## 2023-11-21 NOTE — CHART NOTE - NSCHARTNOTEFT_GEN_A_CORE
Assessment:   83yMalePatient is a 83y old  Male who presents with a chief complaint of Sepsis (21 Nov 2023 09:41). Pt  Visited.  Pt is on NRB  mask. Pt Dg from 5N to 4 N on 11/17. S/P  VFSS / MBS  study  NPO except meds. Palliative consult noted. Family do not want PEG,  Hand feeding only. Pt is on Clear liquid diet. Intake is Poor         Factors impacting intake: [ ] none [ ] nausea  [ ] vomiting [ ] diarrhea [ ] constipation  [ ]chewing problems [ ] swallowing issues  [ ] other:     Diet Prescription: Diet, Clear Liquid:   Mildly Thick Liquids (MILDTHICKLIQS) (11-20-23 @ 13:53)    Intake:      Current Weight:   % Weight Change    Pertinent Medications: MEDICATIONS  (STANDING):  chlorhexidine 2% Cloths 1 Application(s) Topical <User Schedule>  dextrose 5% + sodium chloride 0.9%. 1000 milliLiter(s) (75 mL/Hr) IV Continuous <Continuous>  enoxaparin Injectable 40 milliGRAM(s) SubCutaneous every 24 hours  mupirocin 2% Ointment 1 Application(s) Both Nostrils two times a day  sodium chloride 3%  Inhalation 4 milliLiter(s) Inhalation every 6 hours  trimethoprim/polymyxin Solution 1 Drop(s) Both EYES every 6 hours    MEDICATIONS  (PRN):  acetaminophen  Suppository .. 650 milliGRAM(s) Rectal every 4 hours PRN Temp greater or equal to 38C (100.4F), Mild Pain (1 - 3), Moderate Pain (4 - 6)  glycopyrrolate Injectable 0.4 milliGRAM(s) IV Push every 6 hours PRN respiratory secretions  morphine  - Injectable 1 milliGRAM(s) IV Push every 6 hours PRN Tachypnea (RR > 22) or labored breathing    Pertinent Labs:  11-17 Phos 2.4 mg/dL<L> 11-17 Alb 2.6 g/dL<L>     CAPILLARY BLOOD GLUCOSE        Skin:     Estimated Needs:   [ ] no change since previous assessment  [ ] recalculated:     Previous Nutrition Diagnosis:   [ ] Inadequate Energy Intake [ ]Inadequate Oral Intake [ ] Excessive Energy Intake   [ ] Underweight [ ] Increased Nutrient Needs [ ] Overweight/Obesity   [ ] Altered GI Function [ ] Unintended Weight Loss [ ] Food & Nutrition Related Knowledge Deficit [x ] Malnutrition     Nutrition Diagnosis is [x ] ongoing  [ ] resolved [ ] not applicable     New Nutrition Diagnosis: [ ] not applicable       Interventions:   Recommend  [ ] Change Diet To:  [ ] Nutrition Supplement  [ ] Nutrition Support  [x ] Other:  Per Goals of Care.    Monitoring and Evaluation:   [ ] PO intake [ x ] Tolerance to diet prescription [ x ] weights [ x ] labs[ x ] follow up per protocol  [ ] other: Assessment:   83yMalePatient is a 83y old  Male who presents with a chief complaint of Sepsis (21 Nov 2023 09:41). Pt  Visited.  Pt is on NRB  mask. Pt Dg from 5N to 4 N on 11/17. S/P  VFSS / MBS  study  NPO except meds. Palliative consult noted. Family do not want PEG,  Hand feeding only. Pt is on Clear liquid diet. Intake is Poor. Pt is NPO / Clear Liquid = ~ 9 days.        Factors impacting intake: [ ] none [ ] nausea  [ ] vomiting [ ] diarrhea [ ] constipation  [ ]chewing problems [ ] swallowing issues  [ ] other:     Diet Prescription: Diet, Clear Liquid:   Mildly Thick Liquids (MILDTHICKLIQS) (11-20-23 @ 13:53)    Intake:      Current Weight:   % Weight Change    Pertinent Medications: MEDICATIONS  (STANDING):  chlorhexidine 2% Cloths 1 Application(s) Topical <User Schedule>  dextrose 5% + sodium chloride 0.9%. 1000 milliLiter(s) (75 mL/Hr) IV Continuous <Continuous>  enoxaparin Injectable 40 milliGRAM(s) SubCutaneous every 24 hours  mupirocin 2% Ointment 1 Application(s) Both Nostrils two times a day  sodium chloride 3%  Inhalation 4 milliLiter(s) Inhalation every 6 hours  trimethoprim/polymyxin Solution 1 Drop(s) Both EYES every 6 hours    MEDICATIONS  (PRN):  acetaminophen  Suppository .. 650 milliGRAM(s) Rectal every 4 hours PRN Temp greater or equal to 38C (100.4F), Mild Pain (1 - 3), Moderate Pain (4 - 6)  glycopyrrolate Injectable 0.4 milliGRAM(s) IV Push every 6 hours PRN respiratory secretions  morphine  - Injectable 1 milliGRAM(s) IV Push every 6 hours PRN Tachypnea (RR > 22) or labored breathing    Pertinent Labs:  11-17 Phos 2.4 mg/dL<L> 11-17 Alb 2.6 g/dL<L>     CAPILLARY BLOOD GLUCOSE        Skin:     Estimated Needs:   [ ] no change since previous assessment  [ ] recalculated:     Previous Nutrition Diagnosis:   [ ] Inadequate Energy Intake [ ]Inadequate Oral Intake [ ] Excessive Energy Intake   [ ] Underweight [ ] Increased Nutrient Needs [ ] Overweight/Obesity   [ ] Altered GI Function [ ] Unintended Weight Loss [ ] Food & Nutrition Related Knowledge Deficit [x ] Malnutrition     Nutrition Diagnosis is [x ] ongoing  [ ] resolved [ ] not applicable     New Nutrition Diagnosis: [ ] not applicable       Interventions:   Recommend  [ ] Change Diet To:  [ ] Nutrition Supplement  [ ] Nutrition Support  [x ] Other:  Per Goals of Care.    Monitoring and Evaluation:   [ ] PO intake [ x ] Tolerance to diet prescription [ x ] weights [ x ] labs[ x ] follow up per protocol  [ ] other:

## 2023-11-21 NOTE — PROGRESS NOTE ADULT - ASSESSMENT
84yo man trivial distant smoking hx w/ PMH dementia presented with weakness, dyspnea and cough; found with +coronavirus (non-COVID) and also being worked up for CVA with presentation suggestive of viral bronchitis possibly complicated by aspiration/impaired airway clearance. Chest imaging without consolidation, and with increased mucus production possibly with impaired clearance, suggestive of bronchitis. Overnight with new fever, possible aspiration event.    #Viral bronchitis  #Coronavirus (non-COVID) infection  #Aspiration/mucus plugging      Recommendations:  - comfort measures  - conservative mgmt for coronavirus infection  - can consider CXR to evaluate for aspiration pneumonitis/pneumonia  - If in line with goals of care, airway clearance regimen as follows:  --> duoneb  q6h standing   --> sodium chloride 3% neb q6h with albuterol/duoneb   --> can d/c chest vest  - suctioning PRN  - maintain strict aspirations at all times, keep head of bed elevated ~45 deg  - FEES with severe oropharyngeal dysfunction; pleasure feeds  - Pending d/c to LTC facility with hospice 82yo man trivial distant smoking hx w/ PMH dementia presented with weakness, dyspnea and cough; found with +coronavirus (non-COVID) and also being worked up for CVA with presentation suggestive of viral bronchitis possibly complicated by aspiration/impaired airway clearance. Chest imaging without consolidation, and with increased mucus production possibly with impaired clearance, suggestive of bronchitis. Overnight with new fever, possible aspiration event.    #Viral bronchitis  #Coronavirus (non-COVID) infection  #Aspiration/mucus plugging      Recommendations:  - comfort measures  - conservative mgmt for coronavirus infection  - if in line with GOC can consider CXR to evaluate for aspiration pneumonitis/pneumonia  - If in line with goals of care, airway clearance regimen as follows:  --> duoneb  q6h standing   --> sodium chloride 3% neb q6h with albuterol/duoneb   --> can d/c chest vest  - suctioning PRN  - maintain strict aspirations at all times, keep head of bed elevated ~45 deg  - FEES with severe oropharyngeal dysfunction; pleasure feeds  - Pending d/c to LTC facility with hospice

## 2023-11-21 NOTE — PROGRESS NOTE ADULT - SUBJECTIVE AND OBJECTIVE BOX
NP Note discussed with  Primary Attending    Patient is a 83y old  Male who presents with a chief complaint of Sepsis (21 Nov 2023 09:41)      INTERVAL HPI/OVERNIGHT EVENTS: no acute events overnight    MEDICATIONS  (STANDING):  chlorhexidine 2% Cloths 1 Application(s) Topical <User Schedule>  dextrose 5% + sodium chloride 0.9%. 1000 milliLiter(s) (75 mL/Hr) IV Continuous <Continuous>  enoxaparin Injectable 40 milliGRAM(s) SubCutaneous every 24 hours  mupirocin 2% Ointment 1 Application(s) Both Nostrils two times a day  sodium chloride 3%  Inhalation 4 milliLiter(s) Inhalation every 6 hours  trimethoprim/polymyxin Solution 1 Drop(s) Both EYES every 6 hours    MEDICATIONS  (PRN):  acetaminophen  Suppository .. 650 milliGRAM(s) Rectal every 4 hours PRN Temp greater or equal to 38C (100.4F), Mild Pain (1 - 3), Moderate Pain (4 - 6)  glycopyrrolate Injectable 0.4 milliGRAM(s) IV Push every 6 hours PRN respiratory secretions  morphine  - Injectable 1 milliGRAM(s) IV Push every 6 hours PRN Tachypnea (RR > 22) or labored breathing      __________________________________________________  REVIEW OF SYSTEMS:  STEPH due to mental status    Vital Signs Last 24 Hrs  T(C): 39.6 (21 Nov 2023 14:06), Max: 39.6 (21 Nov 2023 14:06)  T(F): 103.2 (21 Nov 2023 14:06), Max: 103.2 (21 Nov 2023 14:06)  HR: 108 (21 Nov 2023 14:06) (78 - 108)  BP: 115/52 (21 Nov 2023 14:06) (115/52 - 162/67)  BP(mean): --  RR: 20 (21 Nov 2023 14:06) (17 - 20)  SpO2: 82% (21 Nov 2023 14:06) (82% - 99%)    Parameters below as of 21 Nov 2023 14:06  Patient On (Oxygen Delivery Method): mask, nonrebreather  O2 Flow (L/min): 15      ________________________________________________  PHYSICAL EXAM:  GENERAL: NAD  HEENT: Normocephalic  NECK : supple  CHEST/LUNG: Clear to auscultation bilaterally  HEART: S1 S2  regular  ABDOMEN: Soft, Nontender, Nondistended; Bowel sounds present  EXTREMITIES: no edema  SKIN: warm and dry; no rash  NERVOUS SYSTEM:  lethargic; minimally verbal;     _________________________________________________  LABS:              CAPILLARY BLOOD GLUCOSE            RADIOLOGY & ADDITIONAL TESTS:  < from: Xray Chest 1 View- PORTABLE-Routine (Xray Chest 1 View- PORTABLE-Routine .) (11.17.23 @ 13:25) >  INTERPRETATION:  HISTORY: Admitting Dxs: A41.9 SEPSIS, UNSPECIFIED   ORGANISM; ; abnormal lung exam;  TECHNIQUE: Portable frontal view of the chest, 1 view.  COMPARISON: 11/11/2023.  FINDINGS/  IMPRESSION:    HEART: normal in size.  LUNGS: Left lower lobe consolidation. Followup is recommended to   demonstrate resolution..  BONES: degenerative changes    < end of copied text >      < from: MR Head w/wo IV Cont (11.13.23 @ 17:22) >  FINDINGS:  Motion degraded examination. Within these limits:    Please note that sequences labeled "post contrast" demonstrate no   presence of contrast material. Within these limits:    No acute infarct or intracranial hemorrhage.  Scattered T2/FLAIR hyperintense white matter foci, most compatible with   chronic small vessel changes.    No hydrocephalus. No extra-axial fluid collections. The skull base flow   voids are present.    The visualized intraorbital contents are normal. Uterus retention cyst   versus polyp in the right sphenoid sinus. The mastoid air cells are   clear. The visualized osseous structures, soft tissues and partially   visualized parotid glands appear normal.    IMPRESSION:    Please note that sequences labeled "post contrast" demonstrate no   contrast material. No documentation from technologist was provided as to   reason for failed injection. Postcontrast imaging can be reattempted if   clinically warranted. This was discussed with Dr. Atkins at 9:05 AM   11/14/2023.    No acute infarct, hemorrhage, or mass identified.    < end of copied text >        Imaging Personally Reviewed:  YES    Consultant(s) Notes Reviewed:   YES      Plan of care was discussed with patient and /or primary care giver; all questions and concerns were addressed and care was aligned with patient's wishes.

## 2023-11-21 NOTE — PROGRESS NOTE ADULT - PROBLEM SELECTOR PLAN 5
? 2/2 Bell's Palsy vs possible CVA, CT/MRI of head negative  Neuro input appreciated, completed course of oral Prednisone    Now in terminal respiratory failure, appears to be actively dying  Please discontinue valacyclovir--of no further benefit

## 2023-11-21 NOTE — PROGRESS NOTE ADULT - PROBLEM SELECTOR PLAN 2
Due to severe viral URI/bronchitis and non-COVID coronavirus infection, with significant pulmonary congestion.  Now in terminal respiratory failure.    Will start IV morphine 1 mg Q 4 hrs PRN dyspnea/IWOB, may need to titrate rapidly  Bowel regimen in place with Dulcolax supp PRN  Discussed with medical team and Dr. Sommers in detail

## 2023-11-21 NOTE — PROGRESS NOTE ADULT - ASSESSMENT
83 y.o. M with PMHx of HTN, dementia, presenting with generalized weakness and shortness of breath. Admitted for sepsis 2/2 AHRF, hospital course c/b difficulty swallowing, failed FEES. Palliative care consulted regarding GOC, family decided to not pursue PEG tube, DNR/DNI, initially planning for LTC with hospice, now with fever and desaturation overnight likely repeat aspiration. Patient's family continues to want to pursue comfort measures. Palliative care to evaluate for possible inpatient hospice.

## 2023-11-21 NOTE — PHARMACOTHERAPY INTERVENTION NOTE - COMMENTS
Patient on trimethoprim/polymyxin eye drops every 12 hours for conjunctivitis, suggest to increase frequency to every 6 hours.  
To complete 7 days (21 doses) on 11/21/23
Indication: Bell palsy
Indication: Bell palsy  Consider valacyclovir 1000 mg oral every 8 hours

## 2023-11-22 NOTE — PROGRESS NOTE ADULT - PROBLEM SELECTOR PLAN 7
comfort care  MEWS EXEMPT  family updated on status
As above.  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  Patient is medically appropriate for hospice with likely prognosis of weeks to months    See GOC discussion from --patient's wife/caregiver recently , son overwhelmed.  Did agree to DNR/DNI, but unable to decide about long term nutrition at this time.  Unable to reach son for additional discussion this afternoon.    Continue management as per primary medical team  Will plan for additional GOC conversations  DNR/DNI orders in place  Would benefit from ongoing Pall Care team SW support  Discussed with Dr. Thomson  Palliative care team will continue to follow
Due to advanced dementia, patient essentially bedbound at baseline.  At significant risk of further debility and decline, including aspiration, recurrent infections, and skin failure/breakdown.    Recommend:  Offloading of heels   Turning and positioning Q 2 hours at tolerated
Palliative care to evaluate for possible inpatient hospice.
Due to advanced dementia, patient essentially bedbound at baseline.  At significant risk of further debility and decline, including aspiration, recurrent infections, and skin failure/breakdown.    Recommend:  Offloading of heels   Turning and positioning Q 2 hours at tolerated
As above.  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  Patient is medically appropriate for hospice with likely prognosis of weeks to months    See GOC discussion from 11/14 and from today--son and daughter now in agreement with comfort feeds, no PEG, and transition to comfort measures only.  Agreeable to long term placement with hospice.    Continue management as per primary medical team  DNR/DNI?DNH in place  CMO/MEWS exempt, no further blood draws/imaging/escalation of care  Would benefit from ongoing Pall Care team SW support  Discussed with Dr. Thomson in detail  Palliative care team will continue to follow

## 2023-11-22 NOTE — PROGRESS NOTE ADULT - PROBLEM SELECTOR PLAN 6
DVT ppx: lovenox
DVT ppx: lovenox
Due to advanced dementia, patient essentially bedbound at baseline.  At significant risk of further debility and decline, including aspiration, recurrent infections, and skin failure/breakdown.    Recommend:  Offloading of heels   Turning and positioning Q 2 hours at tolerated
DVT ppx: lovenox
Clinical evidence indicates that the patient has Moderate protein calorie malnutrition/ 2nd degree  In context of Chronic Illness (>1 month)--advanced dementia, as evidenced by    Energy/Food intake <75% of estimated energy requirement >7 days  Weight loss: Mild  (lbs lost recently)  Body Fat loss: Mild  scant temporal wasting  Muscle mass loss: Mild  albumin 2.6   Strength: weakened mild to moderate, patient mostly bedbound    Recommend:   Patient in terminal respiratory failure, actively dying, maintain NPO with strict aspiration precautions  Can consider limited comfort feeds as tolerated if mental status improves (unlikely)    Per family, no PEG tube
DVT ppx: lovenox
DVT ppx: lovenox
Due to advanced dementia, patient essentially bedbound at baseline.  At significant risk of further debility and decline, including aspiration, recurrent infections, and skin failure/breakdown.    Recommend:  Offloading of heels   Turning and positioning Q 2 hours at tolerated
Clinical evidence indicates that the patient has Moderate protein calorie malnutrition/ 2nd degree  In context of Chronic Illness (>1 month)--advanced dementia, as evidenced by    Energy/Food intake <75% of estimated energy requirement >7 days  Weight loss: Mild  (lbs lost recently)  Body Fat loss: Mild  scant temporal wasting  Muscle mass loss: Mild  albumin 2.6   Strength: weakened mild to moderate, patient mostly bedbound    Recommend:   Patient in terminal respiratory failure, actively dying, maintain NPO with strict aspiration precautions  Can consider limited comfort feeds as tolerated if mental status improves (unlikely)    Per family, no PEG tube
DVT ppx: lovenox

## 2023-11-22 NOTE — PROGRESS NOTE ADULT - SUBJECTIVE AND OBJECTIVE BOX
NP Note discussed with  Primary Attending    Patient is a 83y old  Male who presents with a chief complaint of Sepsis (22 Nov 2023 09:45)      INTERVAL HPI/OVERNIGHT EVENTS: no acute events overnight     MEDICATIONS  (STANDING):  chlorhexidine 2% Cloths 1 Application(s) Topical <User Schedule>  enoxaparin Injectable 40 milliGRAM(s) SubCutaneous every 24 hours  morphine  - Injectable 2 milliGRAM(s) IV Push every 4 hours  mupirocin 2% Ointment 1 Application(s) Both Nostrils two times a day  sodium chloride 3%  Inhalation 4 milliLiter(s) Inhalation every 6 hours  trimethoprim/polymyxin Solution 1 Drop(s) Both EYES every 6 hours    MEDICATIONS  (PRN):  acetaminophen  Suppository .. 650 milliGRAM(s) Rectal every 4 hours PRN Temp greater or equal to 38C (100.4F), Mild Pain (1 - 3), Moderate Pain (4 - 6)  glycopyrrolate Injectable 0.4 milliGRAM(s) IV Push every 6 hours PRN respiratory secretions  morphine  - Injectable 2 milliGRAM(s) IV Push every 2 hours PRN Tachypnea (RR > 22) or labored breathing      __________________________________________________  REVIEW OF SYSTEMS:    STEPH due to mental status    Vital Signs Last 24 Hrs  T(C): 38.7 (22 Nov 2023 14:03), Max: 40.1 (21 Nov 2023 20:05)  T(F): 101.6 (22 Nov 2023 14:03), Max: 104.1 (21 Nov 2023 20:05)  HR: 104 (22 Nov 2023 14:03) (92 - 123)  BP: 93/63 (22 Nov 2023 14:03) (82/42 - 93/63)  BP(mean): --  RR: 22 (22 Nov 2023 14:03) (21 - 22)  SpO2: 94% (22 Nov 2023 14:03) (85% - 94%)    Parameters below as of 22 Nov 2023 14:03  Patient On (Oxygen Delivery Method): mask, nonrebreather        ________________________________________________  PHYSICAL EXAM:  GENERAL: NAD; thin  HEENT: Normocephalic  NECK : supple  CHEST/LUNG: + rhonchi b/l throughout; + tachypnic  HEART: S1 S2  regular  ABDOMEN: Soft, Nontender, Nondistended;  EXTREMITIES: + trace edema to b/l LE  SKIN: warm and dry; no rash  NERVOUS SYSTEM:  Lethargic; difficult to arouse    _________________________________________________  LABS:              CAPILLARY BLOOD GLUCOSE            RADIOLOGY & ADDITIONAL TESTS:  < from: Xray Chest 1 View- PORTABLE-Routine (Xray Chest 1 View- PORTABLE-Routine .) (11.17.23 @ 13:25) >  INTERPRETATION:  HISTORY: Admitting Dxs: A41.9 SEPSIS, UNSPECIFIED   ORGANISM; ; abnormal lung exam;  TECHNIQUE: Portable frontal view of the chest, 1 view.  COMPARISON: 11/11/2023.  FINDINGS/  IMPRESSION:    HEART: normal in size.  LUNGS: Left lower lobe consolidation. Followup is recommended to   demonstrate resolution..  BONES: degenerative changes  .    --- End of Report ---    < end of copied text >          Imaging Personally Reviewed:  YES    Consultant(s) Notes Reviewed:   YES      Plan of care was discussed with patient and /or primary care giver; all questions and concerns were addressed and care was aligned with patient's wishes.

## 2023-11-22 NOTE — PROGRESS NOTE ADULT - PROBLEM SELECTOR PROBLEM 7
Discharge planning issues
Discharge planning issues
Physical debility
Discharge planning issues
Encounter for palliative care
Physical debility
Encounter for palliative care

## 2023-11-22 NOTE — PROGRESS NOTE ADULT - PROBLEM SELECTOR PLAN 3
Previous SLP input appreciated--FEES with evidence of silent laryngeal penetration and aspiration with multiple consistences, currently remains NPO.  Unclear if dysphagia temporary due to concurrent AHRF/coronavirus infection, at significant risk of evolving into long term dysphagia.  MBS from 11/15 confirmed moderate to severe oropharyngeal discussion--per my discussion with SLP, likely 2/2 progressive dementia, unlikely to resolve over the short term    Started on thickened liquid diet over weekend but now with likely aspiration event, back to NPO status, continue strict aspiration precautions.

## 2023-11-22 NOTE — PROGRESS NOTE ADULT - PROVIDER SPECIALTY LIST ADULT
Internal Medicine
Palliative Care
Palliative Care
Pulmonology
Pulmonology
Palliative Care
Palliative Care
Pulmonology
Internal Medicine
Internal Medicine
Neurology
Pulmonology
Pulmonology
Internal Medicine
Neurology
Pulmonology
Pulmonology
Internal Medicine

## 2023-11-22 NOTE — PROGRESS NOTE ADULT - ASSESSMENT
83 y.o. M with PMHx of HTN and advanced dementia, with wife (primary caretaker) dying approx 1 week ago, who presented to [facility] ER late evening 11/10 with generalized weakness and shortness of breath. In ER found to have + RVP for non-COVID coronavirus.  Patient admitted for sepsis 2/2 AHRF with acute viral URI, and ambulatory dysfunction.  Also noted to have bilateral conjunctivitis.  Received IV Rocephin x 1 dose.  On day after admission (11/12), patient was noted to have a right sided facial droop but no other definitive neuro symptoms, ? possible CVA vs Bell's palsy.  Neurology consulted, CT and MRI of brain both negative for infarct, bleed, or acute intracranial disease (MRI limited by motion artifact), started on oral prednisone and antiviral tx with acyclovir (now being changed to Valtrex).  Hospital course further complicated by severe pulmonary congestion with copious secretions requiring frequent suctioning, and ? new finding of severe oropharyngeal dysphagia, currently remains NPO.    Per GOC discussion on 11/17, family has elected for DNR/DNI/comfort measures, in agreement with LTC with hospice.  Patient now with change in status, appears to be in acute respiratory failure, actively dying.

## 2023-11-22 NOTE — PROGRESS NOTE ADULT - ASSESSMENT
84yo man trivial distant smoking hx w/ PMH dementia presented with weakness, dyspnea and cough; found with +coronavirus (non-COVID) and also being worked up for CVA with presentation suggestive of viral bronchitis possibly complicated by aspiration/impaired airway clearance. Chest imaging without consolidation, and with increased mucus production possibly with impaired clearance, suggestive of bronchitis. Overnight with new fever, possible aspiration event.    #Viral bronchitis  #Coronavirus (non-COVID) infection  #Aspiration/mucus plugging      Recommendations:  - comfort measures  - conservative mgmt for coronavirus infection  - if in line with GOC can consider CXR to evaluate for aspiration pneumonitis/pneumonia  - If in line with goals of care, airway clearance regimen as follows:  --> duoneb  q6h standing   --> sodium chloride 3% neb q6h with albuterol/duoneb   - suctioning PRN  - initiated on morphine and glycopyrrolate  - maintain strict aspirations at all times, keep head of bed elevated ~45 deg  - FEES with severe oropharyngeal dysfunction; pleasure feeds  - Pending d/c to LTC facility with hospice vs. inpatient hospice  - Please contact Pulmonology for further questions or concerns      Yane Bob MD  Pulmonary & Critical Care  Available on Teams

## 2023-11-22 NOTE — PROGRESS NOTE ADULT - REASON FOR ADMISSION
Sepsis

## 2023-11-22 NOTE — PROGRESS NOTE ADULT - TIME BILLING
- personally reviewed pt's labs, VS/flowsheets, pertinent imaging, and consultant notes  - bedside SpO2 measurement to determine supplemental O2 needs  - general pulm hx/exam  - medication reconciliation  - coordination of care with primary team
- personally reviewed pt's labs, VS/flowsheets, pertinent imaging, and consultant notes  - bedside SpO2 measurement to determine supplemental O2 needs  - general pulm hx/exam  - medication reconciliation  - coordination of care with primary team
- Review of chart (interval documentation, labs/studies, VS trends)  - Medication reconciliation  - Bedside interview and physical examination  - Bedside SpO2 monitoring and supplemental O2 titration
- personally reviewed pt's labs, VS/flowsheets, pertinent imaging, and consultant notes  - bedside SpO2 measurement to determine supplemental O2 needs  - general pulm hx/exam  - medication reconciliation  - coordination of care with primary team
- Review of chart (documentation, labs/studies, VS trends)  - Personal review of chest imaging  - Medication reconciliation  - Bedside interview and physical examination  - Bedside SpO2 monitoring and supplemental O2 titration
- Review of chart (interval documentation, labs/studies, VS trends)  - Medication reconciliation  - Bedside interview and physical examination  - Bedside SpO2 monitoring and supplemental O2 titration  - Coordination of care with primary team
- personally reviewed pt's labs, VS/flowsheets, pertinent imaging, and consultant notes  - bedside SpO2 measurement to determine supplemental O2 needs  - general pulm hx/exam  - medication reconciliation  - coordination of care with primary team

## 2023-11-22 NOTE — PROGRESS NOTE ADULT - PROBLEM SELECTOR PLAN 8
As above.  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  Patient is medically appropriate for hospice with likely prognosis of weeks to months    See GOC discussion from 11/14 and 11/17--son and daughter now in agreement with comfort feeds, no PEG, and transition to comfort measures only.  Agreeable to long term placement with hospice.    11/21--patient with likely aspiration event, now in terminal respiratory failure, appears to be actively dying.  Family remains in agreement with comfort measures only (per d/w Dr. Sommers).  Patient now IPU/inpatient hospice appropriate with likely prognosis of days    Continue management as per primary medical team  Start IV morphine Q 4 PRN dyspnea, titrate as needed  Start IV glycopyrrolate Q 6 hrs PRN secretions  Please D/C all meds not designated for comfort  DNR/DNI/DNH in place  CMO/MEWS exempt, no further blood draws/imaging/escalation of care    Discussed with medical team in detail  Palliative care team will continue to follow closely
As above.  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  Patient is medically appropriate for hospice with likely prognosis of weeks to months    See GOC discussion from 11/14 and 11/17--son and daughter now in agreement with comfort feeds, no PEG, and transition to comfort measures only.  Agreeable to long term placement with hospice.    11/21--patient with likely aspiration event, now in terminal respiratory failure, appears to be actively dying.  Family remains in agreement with comfort measures only (per d/w Dr. Sommers).  Patient now IPU/inpatient hospice appropriate with likely prognosis of days    Continue management as per primary medical team  Will add IV morphine 1 mg Q 4hrs ATC, with 1 mg Q 1 hour PRN  Continue IV glycopyrrolate Q 6 hrs PRN secretions  DNR/DNI/DNH in place  CMO/MEWS exempt, no further blood draws/imaging/escalation of care    Discussed with medical team and Dr. Sommers in detail  Tommy Nettlesui updated by phone--EOL education and counseling given  Palliative care team will continue to follow closely

## 2023-11-22 NOTE — PROGRESS NOTE ADULT - ASSESSMENT
83 y.o. M with PMHx of HTN, dementia, presenting with generalized weakness and shortness of breath. Admitted for sepsis 2/2 AHRF, hospital course c/b difficulty swallowing, failed FEES. Palliative care consulted regarding GOC, family decided to not pursue PEG tube, DNR/DNI, initially planning for LTC with hospice, now with fever and desaturation despte nonrebreather likely repeat aspiration. Patient's family continues to want to pursue comfort measures. Palliative care to evaluate for possible inpatient hospice.

## 2023-11-22 NOTE — CHART NOTE - NSCHARTNOTESELECT_GEN_ALL_CORE
BP/Event Note
Event Note
Event Note
Medicine 2nd Touch/Event Note
Nutrition Services
Nutrition Services

## 2023-11-22 NOTE — PROGRESS NOTE ADULT - PROBLEM SELECTOR PLAN 2
Due to severe viral URI/bronchitis and non-COVID coronavirus infection, with significant pulmonary congestion.  Now in terminal respiratory failure, actively dying    Start IV morphine 1 mg Q 4 hrs ATC; change IV morphine to 1 mg Q 1 hour PRN breakthrough pain/IWOB  Low threshold for continuous morphine infusion  Bowel regimen in place with Dulcolax supp PRN

## 2023-11-22 NOTE — PROGRESS NOTE ADULT - NUTRITIONAL ASSESSMENT
This patient has been assessed with a concern for Malnutrition and has been determined to have a diagnosis/diagnoses of Moderate protein-calorie malnutrition.    This patient is being managed with:   Diet Clear Liquid-  Mildly Thick Liquids (MILDTHICKLIQS)  Entered: Nov 20 2023  1:52PM  
This patient has been assessed with a concern for Malnutrition and has been determined to have a diagnosis/diagnoses of Moderate protein-calorie malnutrition.    This patient is being managed with:   Diet NPO-  Except Medications  With Ice Chips/Sips of Water  Entered: Nov 12 2023  3:30PM  
This patient has been assessed with a concern for Malnutrition and has been determined to have a diagnosis/diagnoses of Moderate protein-calorie malnutrition.    This patient is being managed with:   Diet NPO-  Except Medications  With Ice Chips/Sips of Water  Entered: Nov 19 2023  5:35AM  
This patient has been assessed with a concern for Malnutrition and has been determined to have a diagnosis/diagnoses of Moderate protein-calorie malnutrition.    This patient is being managed with:   Diet Clear Liquid-  Mildly Thick Liquids (MILDTHICKLIQS)  Entered: Nov 20 2023  1:52PM  
This patient has been assessed with a concern for Malnutrition and has been determined to have a diagnosis/diagnoses of Moderate protein-calorie malnutrition.    This patient is being managed with:   Diet NPO-  Except Medications  With Ice Chips/Sips of Water  Entered: Nov 12 2023  3:30PM  
This patient has been assessed with a concern for Malnutrition and has been determined to have a diagnosis/diagnoses of Moderate protein-calorie malnutrition.    This patient is being managed with:   Diet Clear Liquid-  Mildly Thick Liquids (MILDTHICKLIQS)  Entered: Nov 20 2023  1:52PM  
This patient has been assessed with a concern for Malnutrition and has been determined to have a diagnosis/diagnoses of Moderate protein-calorie malnutrition.    This patient is being managed with:   Diet NPO-  Except Medications  With Ice Chips/Sips of Water  Entered: Nov 12 2023  3:30PM  
This patient has been assessed with a concern for Malnutrition and has been determined to have a diagnosis/diagnoses of Moderate protein-calorie malnutrition.    This patient is being managed with:   Diet Clear Liquid-  Mildly Thick Liquids (MILDTHICKLIQS)  Entered: Nov 20 2023  1:52PM

## 2023-11-22 NOTE — CHART NOTE - NSCHARTNOTEFT_GEN_A_CORE
EVENT:   11/21/23, 8:08pm, called by RN re: patient's T - 104.1F. Ofirmev (Acetaminophen) 1000 mg IV PB x 1 dose for fever ordered.   11/22/23, 12:30am, patient's T - 102F. Ice pack - applied.   HPI:    82 y/o male with PMH of HTN, dementia, presenting with generalized weakness and shortness of breath. Family at the bedside providing history, state that for the past 2 days pt has been feeling ill, has a cough, and has increased shortness of breath on exertion. Early yesterday the patient developed cough and congestion. They decided to bring him to the ED today after he was unable to walk on his own when he is usually able to walk with a cane.   OBJECTIVE:  Vital Signs Last 24 Hrs  T(C): 37.2 (22 Nov 2023 01:55), Max: 40.1 (21 Nov 2023 20:05)  T(F): 98.9 (22 Nov 2023 01:55), Max: 104.1 (21 Nov 2023 20:05)  HR: 92 (22 Nov 2023 01:55) (78 - 123)  BP: 88/46 (21 Nov 2023 20:05) (88/46 - 150/64)  BP(mean): --  RR: 22 (22 Nov 2023 01:55) (17 - 22)  SpO2: 94% (22 Nov 2023 01:55) (82% - 94%)    Parameters below as of 21 Nov 2023 20:05  Patient On (Oxygen Delivery Method): mask, nonrebreather  O2 Flow (L/min): 15      PLAN:   - Ofirmev (Acetaminophen) 1000 mg IV PB x 1 dose for fever.     FOLLOW UP / RESULT:   - Reassess patient VS,   - Maintain safety and comfort measures,   - Continue supportive care.

## 2023-11-22 NOTE — PROGRESS NOTE ADULT - PROBLEM SELECTOR PROBLEM 6
Prophylactic measure
Moderate protein-calorie malnutrition
Physical debility
Prophylactic measure
Physical debility
Prophylactic measure
Moderate protein-calorie malnutrition
Prophylactic measure

## 2023-11-22 NOTE — PROGRESS NOTE ADULT - SUBJECTIVE AND OBJECTIVE BOX
follow up on:  complex medical decision making related to goals of care    [facility] Geriatric and Palliative Consult Service:  Damaris Brown DO: cell [phone])  Larry Kenny MD: cell ([phone])  Nicholas Alvarado NP: cell [phone])   Bryan Tirado LMSW: cell [phone])   Agatha Guerin NP: via [employer] Teams    Can contact any Palliative Team member via [employer] Teams for consults and questions      OVERNIGHT EVENTS:    Present Symptoms: Mild, Moderate, Severe  Pain:             Location -                               Aggravating factors -             Quality -             Radiation -             Timing-             Severity (0-10 scale):             Minimal acceptable level (0-10 scale):  Fatigue:  Nausea:  Lack of Appetite:   SOB:  Depression:  Anxiety:  Review of Systems: [All others negative or Unable to obtain due to poor mentation]    CPOT:    https://ccs-st.org/resources/Documents/Sedation%20Analgesia%20Delirium%20in%20CC/CCS%20STH%20Guideline%20template%20CPOT.pdf  PAIN AD Score:   [email or web address] (press ctrl +  left click to view)MEDICATIONS  (STANDING):  chlorhexidine 2% Cloths 1 Application(s) Topical <User Schedule>  enoxaparin Injectable 40 milliGRAM(s) SubCutaneous every 24 hours  morphine  - Injectable 2 milliGRAM(s) IV Push every 4 hours  mupirocin 2% Ointment 1 Application(s) Both Nostrils two times a day  sodium chloride 3%  Inhalation 4 milliLiter(s) Inhalation every 6 hours  trimethoprim/polymyxin Solution 1 Drop(s) Both EYES every 6 hours    MEDICATIONS  (PRN):  acetaminophen  Suppository .. 650 milliGRAM(s) Rectal every 4 hours PRN Temp greater or equal to 38C (100.4F), Mild Pain (1 - 3), Moderate Pain (4 - 6)  glycopyrrolate Injectable 0.4 milliGRAM(s) IV Push every 6 hours PRN respiratory secretions  morphine  - Injectable 2 milliGRAM(s) IV Push every 2 hours PRN Tachypnea (RR > 22) or labored breathing      PHYSICAL EXAM:  Vital Signs Last 24 Hrs  T(C): 38.7 (22 Nov 2023 14:03), Max: 40.1 (21 Nov 2023 20:05)  T(F): 101.6 (22 Nov 2023 14:03), Max: 104.1 (21 Nov 2023 20:05)  HR: 104 (22 Nov 2023 14:03) (92 - 123)  BP: 93/63 (22 Nov 2023 14:03) (82/42 - 93/63)  BP(mean): --  RR: 22 (22 Nov 2023 14:03) (21 - 22)  SpO2: 94% (22 Nov 2023 14:03) (85% - 94%)    Parameters below as of 22 Nov 2023 14:03  Patient On (Oxygen Delivery Method): mask, nonrebreather        General: alert  oriented x ____    lethargic distressed cachexia  verbal nonverbal  unarousable     Palliative Performance Scale/Karnofsky Score:  http://npcrc.org/files/news/palliative_performance_scale_ppsv2.pdf    HEENT: no abnormal lesion, dry mouth  ET tube/trach oral lesions:  Lungs: tachypnea/labored breathing, audible excessive secretions  CV: RRR, S1S2, tachycardia  GI: soft non distended non tender  incontinent               PEG/NG/OG tube  constipation  last BM:   : incontinent  oliguria/anuria  manzanares  Musculoskeletal: weakness x4 edema x4    ambulatory with assistance   mostly/fully bedbound/wheelchair bound  Skin: no abnormal skin lesions, poor skin turgor, pressure ulcers stage:   Neuro: no deficits, mild cognitive impairment dsyphagia/dysarthria paresis  Oral intake ability: unable/only mouth care, minimal moderate full capability    LABS:                RADIOLOGY & ADDITIONAL STUDIES: follow up on:  complex medical decision making related to goals of care    [facility] Geriatric and Palliative Consult Service:  Damaris Brown DO: cell [phone])  Larry Kenny MD: cell [phone])  Nicholas Alvarado NP: cell ([phone])   Bryan Tirado LMSW: cell ([phone])   Agatha Guerin NP: via [employer] Teams    Can contact any Palliative Team member via [employer] Teams for consults and questions      OVERNIGHT EVENTS:   Received PRN IV morphine x 1 and IV glycopyrrolate x 1 on 11/21, no doses given so far today.  Febrile to Tmax 102 overnight, remains on NRB mask.    Patient examined at bedside this morning.  Remains unresponsive and tachypneic.  No apparent signs of pain.  No other acute issues reported by nursing staff.      Present Symptoms: Mild, Moderate, Severe  Pain:  Unable to verbalize, CPOT 0      Review of Systems: Unable to obtain due to poor mentation/dementia/medical acuity    CPOT:  0  https://ccs-st.org/resources/Documents/Sedation%20Analgesia%20Delirium%20in%20CC/CCS%20STH%20Guideline%20template%20CPOT.pdf      MEDICATIONS  (STANDING):  chlorhexidine 2% Cloths 1 Application(s) Topical <User Schedule>  enoxaparin Injectable 40 milliGRAM(s) SubCutaneous every 24 hours  morphine  - Injectable 2 milliGRAM(s) IV Push every 4 hours  mupirocin 2% Ointment 1 Application(s) Both Nostrils two times a day  sodium chloride 3%  Inhalation 4 milliLiter(s) Inhalation every 6 hours  trimethoprim/polymyxin Solution 1 Drop(s) Both EYES every 6 hours    MEDICATIONS  (PRN):  acetaminophen  Suppository .. 650 milliGRAM(s) Rectal every 4 hours PRN Temp greater or equal to 38C (100.4F), Mild Pain (1 - 3), Moderate Pain (4 - 6)  glycopyrrolate Injectable 0.4 milliGRAM(s) IV Push every 6 hours PRN respiratory secretions  morphine  - Injectable 2 milliGRAM(s) IV Push every 2 hours PRN Tachypnea (RR > 22) or labored breathing      PHYSICAL EXAM:  Vital Signs Last 24 Hrs  T(C): 38.7 (22 Nov 2023 14:03), Max: 40.1 (21 Nov 2023 20:05)  T(F): 101.6 (22 Nov 2023 14:03), Max: 104.1 (21 Nov 2023 20:05)  HR: 104 (22 Nov 2023 14:03) (92 - 123)  BP: 93/63 (22 Nov 2023 14:03) (82/42 - 93/63)  BP(mean): --  RR: 22 (22 Nov 2023 14:03) (21 - 22)  SpO2: 94% (22 Nov 2023 14:03) (85% - 94%)    Parameters below as of 22 Nov 2023 14:03  Patient On (Oxygen Delivery Method): mask, nonrebreather        General: alert  oriented x ____    lethargic distressed cachexia  verbal nonverbal  unarousable     Palliative Performance Scale/Karnofsky Score:  http://npcrc.org/files/news/palliative_performance_scale_ppsv2.pdf    HEENT: no abnormal lesion, dry mouth  ET tube/trach oral lesions:  Lungs: tachypnea/labored breathing, audible excessive secretions  CV: RRR, S1S2, tachycardia  GI: soft non distended non tender  incontinent               PEG/NG/OG tube  constipation  last BM:   : incontinent  oliguria/anuria  manzanares  Musculoskeletal: weakness x4 edema x4    ambulatory with assistance   mostly/fully bedbound/wheelchair bound  Skin: no abnormal skin lesions, poor skin turgor, pressure ulcers stage:   Neuro: no deficits, mild cognitive impairment dsyphagia/dysarthria paresis  Oral intake ability: unable/only mouth care, minimal moderate full capability    LABS:                RADIOLOGY & ADDITIONAL STUDIES: follow up on:  complex medical decision making related to goals of care    [facility] Geriatric and Palliative Consult Service:  Damaris Brown DO: cell ([phone])  Larry Kenny MD: cell [phone])  Nicholas Alvarado NP: cell [phone])   Bryan Tirado LMSW: cell ([phone])   Agatha Guerin NP: via [employer] Teams    Can contact any Palliative Team member via [employer] Teams for consults and questions      OVERNIGHT EVENTS:   Received PRN IV morphine x 1 and IV glycopyrrolate x 1 on 11/21, no doses given so far today.  Febrile to Tmax 102 overnight, remains on NRB mask.    Patient examined at bedside this morning.  Remains unresponsive and tachypneic.  No apparent signs of pain.  No other acute issues reported by nursing staff.      Present Symptoms: Mild, Moderate, Severe  Pain:  Unable to verbalize, CPOT 0      Review of Systems: Unable to obtain due to poor mentation/dementia/medical acuity    CPOT:  0  https://ccs-st.org/resources/Documents/Sedation%20Analgesia%20Delirium%20in%20CC/CCS%20STH%20Guideline%20template%20CPOT.pdf      MEDICATIONS  (STANDING):  chlorhexidine 2% Cloths 1 Application(s) Topical <User Schedule>  enoxaparin Injectable 40 milliGRAM(s) SubCutaneous every 24 hours  morphine  - Injectable 2 milliGRAM(s) IV Push every 4 hours  mupirocin 2% Ointment 1 Application(s) Both Nostrils two times a day  sodium chloride 3%  Inhalation 4 milliLiter(s) Inhalation every 6 hours  trimethoprim/polymyxin Solution 1 Drop(s) Both EYES every 6 hours    MEDICATIONS  (PRN):  acetaminophen  Suppository .. 650 milliGRAM(s) Rectal every 4 hours PRN Temp greater or equal to 38C (100.4F), Mild Pain (1 - 3), Moderate Pain (4 - 6)  glycopyrrolate Injectable 0.4 milliGRAM(s) IV Push every 6 hours PRN respiratory secretions  morphine  - Injectable 2 milliGRAM(s) IV Push every 2 hours PRN Tachypnea (RR > 22) or labored breathing      PHYSICAL EXAM:  Vital Signs Last 24 Hrs  T(C): 38.7 (22 Nov 2023 14:03), Max: 40.1 (21 Nov 2023 20:05)  T(F): 101.6 (22 Nov 2023 14:03), Max: 104.1 (21 Nov 2023 20:05)  HR: 104 (22 Nov 2023 14:03) (92 - 123)  BP: 93/63 (22 Nov 2023 14:03) (82/42 - 93/63)  BP(mean): --  RR: 22 (22 Nov 2023 14:03) (21 - 22)  SpO2: 94% (22 Nov 2023 14:03) (85% - 94%)    Parameters below as of 22 Nov 2023 14:03  Patient On (Oxygen Delivery Method): mask, nonrebreather        General: alert  oriented x __0__    lethargic, unresponsive, in mild resp distress    Palliative Performance Scale/Karnofsky Score: 10%  http://[facility].org/files/news/palliative_performance_scale_ppsv2.pdf    HEENT:  Anicteric, + NRB in place  Lungs:  tachypneic with labored respirations noted, ++ moderate congestion noted  CV:   tachycardic, normal S1 and S2, no murmur  GI: soft non distended non tender   + normal bowel sounds  : incontinent    Musculoskeletal: weakness x4  in all extremities, essentially bedbound, no edema noted  Skin: no abnormal skin lesions or DU noted  Neuro:  minimal R facial droop appreciated, + severe cognitive impairment, + dysphagia  Oral intake ability: unable/mouth care only, remains NPO      LABS:    Albumin: 2.6 g/dL (11.17.23 @ 05:40)        RADIOLOGY & ADDITIONAL STUDIES:

## 2023-11-22 NOTE — PROGRESS NOTE ADULT - NS ATTEND AMEND GEN_ALL_CORE FT
83M PMH dementia AOx0-1 baseline, ambulates with walker, p/w SOB admission for AHRF 2/2 coronavirus. CCB Bell’s palsy and oropharyngeal dysfunction. Palliative consulted and family agrees to comfort measures, being evaluated for hospice. Care further complicated by likey aspiration event 11/20 with patient requiring more supplementary O2. Son informed of decompensation. Spoke with son who agrees that aggressive measures are unlikely to be helpful in this patient and comfort is more important.     -c/w morphine ivp standing for air hunger and glycopyrrolate  -patient is unlikely to stabilize for transfer to LTC with hospice  -for now, pt will stay inpatient  -discussed with palliative care  -updated family of poor prognosis
83M PMH dementia AOx0-1 baseline, ambulates with walker, p/w SOB admission for AHRF 2/2 coronavirus. CCB Bell’s palsy and oropharyngeal dysfunction. Palliative consulted and family agrees to comfort measures, being evaluated for hospice. Care further complicated by likey aspiration event 11/20 with patient requiring more supplementary O2. Son informed of decompensation. Spoke with son who agrees that aggressive measures are unlikely to be helpful in this patient and comfort is more important. Today, adding glycopyrrolate and morphine. Palliative care to reassess patient’s candidacy for inpatient hospice vs LTC with hospice.
83 y.o. M with PMHx of HTN, dementia- aaox 0-1, ambulates w/. walker,  presenting with generalized weakness and shortness of breath. Admitted for sepsis and AHRF 2/2 corona virus. During hospital stay, pt was noted w/ a subtle facial droop arising possibility of acute stroke vs Bell's palsy, hence underwent MR head which was negative. He was treated for [place] palsy. Patient continued to have oropharyngeal dysfunction and failed SnS and FEES, after length discussion w/ family- patient is for comfort measures and DC to LTC w/ hospice.       Pt was seen and examined, he looked in mild distress, O2 sats dropped to 88% and was suctioned by RN. Sats improved 3-4 L NC.       A/P:  ##Acute Hypoxic Respiratory failure 2/2 Corona virus- non-covid -   #Oropharyngeal dysfunction   #Viral bronchitis   #Aspiration/mucus plugging   #Bell's palsy   #Acute conjunctivitis   #Dementia   #Advance care planning     Plan:  -Patient's O2 sat dropped today likely aspiration related. Suctioning performed.   -C/w conservative measures, needs frequent suctioning. C/w Duoneb, 3% saline inhalation. Chest PT   -Maintain strict aspiration precautions   -C/w valacyclovir and prednisone for bell's palsy, last day today to complete a 7 day course. Will DC meds after pm dose.   -Patient's current oropharyngeal dysfunction could be 2/2 Bell's palsy vs acute illnesses. failed SnS, FEES eval noted. After length discussion of palliative care team w/ family, patient is for DC to LTC w/ hospice. MEWS exempt and comfort measures. C/w  pleasure feeds only.   -c/w losartan   -DNR/DNI.   -CM/SW to follow for dispo , pending acceptance

## 2023-11-22 NOTE — PROGRESS NOTE ADULT - SUBJECTIVE AND OBJECTIVE BOX
PULMONARY CONSULT SERVICE FOLLOW-UP NOTE    INTERVAL HPI:  Reviewed chart and overnight events; patient seen and examined at bedside.    MEDICATIONS:  Pulmonary:  sodium chloride 3%  Inhalation 4 milliLiter(s) Inhalation every 6 hours    Antimicrobials:    Anticoagulants:  enoxaparin Injectable 40 milliGRAM(s) SubCutaneous every 24 hours    Cardiac:      Allergies    No Known Allergies    Intolerances        Vital Signs Last 24 Hrs  T(C): 38.1 (22 Nov 2023 05:45), Max: 40.1 (21 Nov 2023 20:05)  T(F): 100.5 (22 Nov 2023 05:45), Max: 104.1 (21 Nov 2023 20:05)  HR: 97 (22 Nov 2023 05:45) (82 - 123)  BP: 82/42 (22 Nov 2023 05:45) (82/42 - 133/65)  BP(mean): --  RR: 22 (22 Nov 2023 05:45) (17 - 22)  SpO2: 93% (22 Nov 2023 05:45) (82% - 94%)    Parameters below as of 22 Nov 2023 05:45  Patient On (Oxygen Delivery Method): mask, nonrebreather  O2 Flow (L/min): 15          PHYSICAL EXAM:  GEN: NAD, chronically frail-appearing, comfortable semirecumbent in bed  HEENT: conjunctival injection b/l w/ right > left eyelid fullness, right droop; oropharynx clear, MMM  RESP: no respiratory distress, coarse rhonchi throughout  CV: +S1/S2, RRR, no m/g/r  GI: soft, NT/ND, +BS  EXTREM: WWP; no edema, clubbing or cyanosis  Vascular: 2+ radial pulses B/L  NEURO: alert and awake, moving limbs spontaneously, R facial droop    LABS:  none                             RADIOLOGY & ADDITIONAL STUDIES:  No interval chest imaging for review  PULMONARY CONSULT SERVICE FOLLOW-UP NOTE    INTERVAL HPI:  Reviewed chart and overnight events; patient seen and examined at bedside. Pt is lethargic, on NRB, mildly tachypneic but otherwise appearing comfortable. Not responding to speech, unable to assess ROS.    MEDICATIONS:  Pulmonary:  sodium chloride 3%  Inhalation 4 milliLiter(s) Inhalation every 6 hours    Antimicrobials:    Anticoagulants:  enoxaparin Injectable 40 milliGRAM(s) SubCutaneous every 24 hours    Cardiac:      Allergies    No Known Allergies    Intolerances        Vital Signs Last 24 Hrs  T(C): 38.1 (22 Nov 2023 05:45), Max: 40.1 (21 Nov 2023 20:05)  T(F): 100.5 (22 Nov 2023 05:45), Max: 104.1 (21 Nov 2023 20:05)  HR: 97 (22 Nov 2023 05:45) (82 - 123)  BP: 82/42 (22 Nov 2023 05:45) (82/42 - 133/65)  BP(mean): --  RR: 22 (22 Nov 2023 05:45) (17 - 22)  SpO2: 93% (22 Nov 2023 05:45) (82% - 94%)    Parameters below as of 22 Nov 2023 05:45  Patient On (Oxygen Delivery Method): mask, nonrebreather  O2 Flow (L/min): 15          PHYSICAL EXAM:  GEN: mild tachypnea, chronically frail-appearing, comfortable semirecumbent in bed  HEENT: conjunctival injection b/l w/ right > left eyelid fullness, right droop; oropharynx clear, MMM  RESP: no respiratory distress, coarse rhonchi throughout  CV: +S1/S2, RRR, no m/g/r  GI: soft, NT/ND, +BS  EXTREM: WWP; no edema, clubbing or cyanosis  Vascular: 2+ radial pulses B/L  NEURO: lethargic, R facial droop    LABS:  none                             RADIOLOGY & ADDITIONAL STUDIES:  No interval chest imaging for review

## 2024-10-07 NOTE — PATIENT PROFILE ADULT - NSPROGENSOURCEINFO_GEN_A_NUR
In an effort to ensure that our patients LiveWell, a Team Member has reviewed your chart and identified an opportunity to provide the best care possible. An attempt was made to discuss or schedule due or overdue Preventive or Chronic Condition care.Care Gaps identified: Immunizations and Wellness Visits.    The Outcome was Contact was not made, letter/portal message sent.  We are attempting to schedule a yearly wellness visit. If you have any questions or need help with scheduling, contact your primary care provider..   Type of Appointment needed: Medicare Wellness Visit     unable to respond patient/family